# Patient Record
Sex: MALE | Race: WHITE | Employment: OTHER | URBAN - NONMETROPOLITAN AREA
[De-identification: names, ages, dates, MRNs, and addresses within clinical notes are randomized per-mention and may not be internally consistent; named-entity substitution may affect disease eponyms.]

---

## 2011-11-30 LAB — PROSTATE SPECIFIC ANTIGEN: 2.69 NG/ML

## 2012-06-15 LAB — PROSTATE SPECIFIC ANTIGEN: 2.25 NG/ML

## 2013-03-21 LAB — PROSTATE SPECIFIC ANTIGEN: 6.17 NG/ML

## 2013-10-15 LAB — PROSTATE SPECIFIC ANTIGEN: 3.01 NG/ML

## 2014-01-22 LAB — PROSTATE SPECIFIC ANTIGEN: 4.38 NG/ML

## 2014-08-05 LAB — PROSTATE SPECIFIC ANTIGEN: 1.63 NG/ML

## 2014-12-04 LAB — PROSTATE SPECIFIC ANTIGEN: 2.08 NG/ML

## 2015-10-14 LAB — PROSTATE SPECIFIC ANTIGEN: 1.35 NG/ML

## 2018-05-23 ENCOUNTER — HOSPITAL ENCOUNTER (OUTPATIENT)
Age: 78
Discharge: HOME | End: 2018-05-23
Payer: MEDICARE

## 2018-05-23 ENCOUNTER — OFFICE VISIT (OUTPATIENT)
Dept: SURGERY | Age: 78
End: 2018-05-23
Payer: COMMERCIAL

## 2018-05-23 VITALS
SYSTOLIC BLOOD PRESSURE: 116 MMHG | TEMPERATURE: 98.1 F | HEIGHT: 70 IN | HEART RATE: 80 BPM | DIASTOLIC BLOOD PRESSURE: 70 MMHG | RESPIRATION RATE: 18 BRPM | BODY MASS INDEX: 27.92 KG/M2 | WEIGHT: 195 LBS

## 2018-05-23 DIAGNOSIS — I10 ESSENTIAL HYPERTENSION: ICD-10-CM

## 2018-05-23 DIAGNOSIS — Z01.818 PRE-OP TESTING: ICD-10-CM

## 2018-05-23 DIAGNOSIS — K38.9 APPENDIX DISEASE: Primary | ICD-10-CM

## 2018-05-23 LAB
EKG ATRIAL RATE: 77 BPM
EKG P-R INTERVAL: 208 MS
EKG Q-T INTERVAL: 394 MS
EKG QRS DURATION: 108 MS
EKG QTC CALCULATION (BAZETT): 445 MS
EKG R AXIS: 28 DEGREES
EKG T AXIS: 10 DEGREES
EKG VENTRICULAR RATE: 77 BPM

## 2018-05-23 PROCEDURE — G8427 DOCREV CUR MEDS BY ELIG CLIN: HCPCS | Performed by: SURGERY

## 2018-05-23 PROCEDURE — G8419 CALC BMI OUT NRM PARAM NOF/U: HCPCS | Performed by: SURGERY

## 2018-05-23 PROCEDURE — 93005 ELECTROCARDIOGRAM TRACING: CPT | Performed by: SURGERY

## 2018-05-23 PROCEDURE — 99243 OFF/OP CNSLTJ NEW/EST LOW 30: CPT | Performed by: SURGERY

## 2018-05-23 RX ORDER — FINASTERIDE 5 MG/1
5 TABLET, FILM COATED ORAL DAILY
Status: ON HOLD | COMMUNITY
End: 2019-09-05 | Stop reason: HOSPADM

## 2018-05-23 RX ORDER — TAMSULOSIN HYDROCHLORIDE 0.4 MG/1
0.4 CAPSULE ORAL DAILY
Status: ON HOLD | COMMUNITY
End: 2019-09-05 | Stop reason: HOSPADM

## 2018-05-24 PROCEDURE — 93010 ELECTROCARDIOGRAM REPORT: CPT | Performed by: INTERNAL MEDICINE

## 2018-05-27 ASSESSMENT — ENCOUNTER SYMPTOMS
PHOTOPHOBIA: 0
CHEST TIGHTNESS: 0
NAUSEA: 0
VOMITING: 0
SHORTNESS OF BREATH: 0
SORE THROAT: 0
RHINORRHEA: 0
DIARRHEA: 0
ABDOMINAL DISTENTION: 0
BLOOD IN STOOL: 0
ALLERGIC/IMMUNOLOGIC NEGATIVE: 1
VOICE CHANGE: 0
ABDOMINAL PAIN: 0
EYE DISCHARGE: 0
TROUBLE SWALLOWING: 0
COLOR CHANGE: 0
APNEA: 0
ANAL BLEEDING: 0
EYE REDNESS: 0
STRIDOR: 0
WHEEZING: 0
EYE PAIN: 0
SINUS PRESSURE: 0
BACK PAIN: 0
CHOKING: 0
RECTAL PAIN: 0
CONSTIPATION: 0
EYE ITCHING: 0
COUGH: 0
FACIAL SWELLING: 0

## 2018-05-31 ENCOUNTER — HOSPITAL ENCOUNTER (OUTPATIENT)
Age: 78
Setting detail: OUTPATIENT SURGERY
Discharge: HOME | End: 2018-05-31
Attending: SURGERY | Admitting: SURGERY
Payer: MEDICARE

## 2018-05-31 ENCOUNTER — ANESTHESIA (OUTPATIENT)
Dept: OPERATING ROOM | Age: 78
End: 2018-05-31
Payer: MEDICARE

## 2018-05-31 ENCOUNTER — ANESTHESIA EVENT (OUTPATIENT)
Dept: OPERATING ROOM | Age: 78
End: 2018-05-31
Payer: MEDICARE

## 2018-05-31 VITALS
RESPIRATION RATE: 16 BRPM | SYSTOLIC BLOOD PRESSURE: 162 MMHG | TEMPERATURE: 97.1 F | WEIGHT: 189.8 LBS | DIASTOLIC BLOOD PRESSURE: 71 MMHG | HEART RATE: 80 BPM | BODY MASS INDEX: 27.17 KG/M2 | OXYGEN SATURATION: 93 % | HEIGHT: 70 IN

## 2018-05-31 VITALS
RESPIRATION RATE: 13 BRPM | OXYGEN SATURATION: 97 % | SYSTOLIC BLOOD PRESSURE: 208 MMHG | TEMPERATURE: 96.8 F | DIASTOLIC BLOOD PRESSURE: 90 MMHG

## 2018-05-31 DIAGNOSIS — K38.9 APPENDIX DISEASE: Primary | ICD-10-CM

## 2018-05-31 LAB
HCT VFR BLD CALC: 37.5 % (ref 42–52)
HEMOGLOBIN: 12.6 GM/DL (ref 14–18)
POTASSIUM SERPL-SCNC: 3.8 MEQ/L (ref 3.5–5.2)

## 2018-05-31 PROCEDURE — 3600000003 HC SURGERY LEVEL 3 BASE: Performed by: SURGERY

## 2018-05-31 PROCEDURE — 6360000002 HC RX W HCPCS: Performed by: NURSE ANESTHETIST, CERTIFIED REGISTERED

## 2018-05-31 PROCEDURE — 2580000003 HC RX 258

## 2018-05-31 PROCEDURE — 44204 LAPARO PARTIAL COLECTOMY: CPT | Performed by: SURGERY

## 2018-05-31 PROCEDURE — 2720000010 HC SURG SUPPLY STERILE: Performed by: SURGERY

## 2018-05-31 PROCEDURE — 88304 TISSUE EXAM BY PATHOLOGIST: CPT

## 2018-05-31 PROCEDURE — 44970 LAPAROSCOPY APPENDECTOMY: CPT | Performed by: SURGERY

## 2018-05-31 PROCEDURE — 2500000003 HC RX 250 WO HCPCS: Performed by: NURSE ANESTHETIST, CERTIFIED REGISTERED

## 2018-05-31 PROCEDURE — 6360000002 HC RX W HCPCS: Performed by: ANESTHESIOLOGY

## 2018-05-31 PROCEDURE — 2500000003 HC RX 250 WO HCPCS

## 2018-05-31 PROCEDURE — 2780000010 HC IMPLANT OTHER: Performed by: SURGERY

## 2018-05-31 PROCEDURE — 84132 ASSAY OF SERUM POTASSIUM: CPT

## 2018-05-31 PROCEDURE — 3700000000 HC ANESTHESIA ATTENDED CARE: Performed by: SURGERY

## 2018-05-31 PROCEDURE — 2500000003 HC RX 250 WO HCPCS: Performed by: ANESTHESIOLOGY

## 2018-05-31 PROCEDURE — 7100000000 HC PACU RECOVERY - FIRST 15 MIN: Performed by: SURGERY

## 2018-05-31 PROCEDURE — 36415 COLL VENOUS BLD VENIPUNCTURE: CPT

## 2018-05-31 PROCEDURE — 6370000000 HC RX 637 (ALT 250 FOR IP): Performed by: SURGERY

## 2018-05-31 PROCEDURE — 3700000001 HC ADD 15 MINUTES (ANESTHESIA): Performed by: SURGERY

## 2018-05-31 PROCEDURE — 3600000013 HC SURGERY LEVEL 3 ADDTL 15MIN: Performed by: SURGERY

## 2018-05-31 PROCEDURE — 2500000003 HC RX 250 WO HCPCS: Performed by: SURGERY

## 2018-05-31 PROCEDURE — 6360000002 HC RX W HCPCS

## 2018-05-31 PROCEDURE — 7100000010 HC PHASE II RECOVERY - FIRST 15 MIN: Performed by: SURGERY

## 2018-05-31 PROCEDURE — 7100000001 HC PACU RECOVERY - ADDTL 15 MIN: Performed by: SURGERY

## 2018-05-31 PROCEDURE — 85018 HEMOGLOBIN: CPT

## 2018-05-31 PROCEDURE — 7100000011 HC PHASE II RECOVERY - ADDTL 15 MIN: Performed by: SURGERY

## 2018-05-31 PROCEDURE — 85014 HEMATOCRIT: CPT

## 2018-05-31 DEVICE — DISCONTINUED USE 399249 CS RELOAD ECHELON WHITE 45MM: Type: IMPLANTABLE DEVICE | Site: ABDOMEN | Status: FUNCTIONAL

## 2018-05-31 DEVICE — RELOAD STPL L45MM H15 35MM REG TISS BLU 6 ROW B FRM NAT: Type: IMPLANTABLE DEVICE | Site: ABDOMEN | Status: FUNCTIONAL

## 2018-05-31 RX ORDER — NEOSTIGMINE METHYLSULFATE 1 MG/ML
INJECTION, SOLUTION INTRAVENOUS PRN
Status: DISCONTINUED | OUTPATIENT
Start: 2018-05-31 | End: 2018-05-31 | Stop reason: SDUPTHER

## 2018-05-31 RX ORDER — ACETAMINOPHEN 325 MG/1
650 TABLET ORAL EVERY 4 HOURS PRN
Status: DISCONTINUED | OUTPATIENT
Start: 2018-05-31 | End: 2018-05-31 | Stop reason: HOSPADM

## 2018-05-31 RX ORDER — ROCURONIUM BROMIDE 10 MG/ML
INJECTION, SOLUTION INTRAVENOUS PRN
Status: DISCONTINUED | OUTPATIENT
Start: 2018-05-31 | End: 2018-05-31 | Stop reason: SDUPTHER

## 2018-05-31 RX ORDER — HYDROCODONE BITARTRATE AND ACETAMINOPHEN 5; 325 MG/1; MG/1
1-2 TABLET ORAL EVERY 6 HOURS PRN
Qty: 30 TABLET | Refills: 0 | Status: SHIPPED | OUTPATIENT
Start: 2018-05-31 | End: 2018-06-06

## 2018-05-31 RX ORDER — GLYCOPYRROLATE 1 MG/5 ML
SYRINGE (ML) INTRAVENOUS PRN
Status: DISCONTINUED | OUTPATIENT
Start: 2018-05-31 | End: 2018-05-31 | Stop reason: SDUPTHER

## 2018-05-31 RX ORDER — PROPOFOL 10 MG/ML
INJECTION, EMULSION INTRAVENOUS PRN
Status: DISCONTINUED | OUTPATIENT
Start: 2018-05-31 | End: 2018-05-31 | Stop reason: SDUPTHER

## 2018-05-31 RX ORDER — ONDANSETRON 2 MG/ML
4 INJECTION INTRAMUSCULAR; INTRAVENOUS EVERY 6 HOURS PRN
Status: DISCONTINUED | OUTPATIENT
Start: 2018-05-31 | End: 2018-05-31 | Stop reason: HOSPADM

## 2018-05-31 RX ORDER — HYDROCODONE BITARTRATE AND ACETAMINOPHEN 5; 325 MG/1; MG/1
2 TABLET ORAL EVERY 4 HOURS PRN
Status: DISCONTINUED | OUTPATIENT
Start: 2018-05-31 | End: 2018-05-31 | Stop reason: HOSPADM

## 2018-05-31 RX ORDER — FENTANYL CITRATE 50 UG/ML
INJECTION, SOLUTION INTRAMUSCULAR; INTRAVENOUS PRN
Status: DISCONTINUED | OUTPATIENT
Start: 2018-05-31 | End: 2018-05-31 | Stop reason: SDUPTHER

## 2018-05-31 RX ORDER — MORPHINE SULFATE 2 MG/ML
4 INJECTION, SOLUTION INTRAMUSCULAR; INTRAVENOUS
Status: DISCONTINUED | OUTPATIENT
Start: 2018-05-31 | End: 2018-05-31

## 2018-05-31 RX ORDER — MORPHINE SULFATE 2 MG/ML
2 INJECTION, SOLUTION INTRAMUSCULAR; INTRAVENOUS EVERY 5 MIN PRN
Status: DISCONTINUED | OUTPATIENT
Start: 2018-05-31 | End: 2018-05-31 | Stop reason: HOSPADM

## 2018-05-31 RX ORDER — FENTANYL CITRATE 50 UG/ML
50 INJECTION, SOLUTION INTRAMUSCULAR; INTRAVENOUS EVERY 5 MIN PRN
Status: DISCONTINUED | OUTPATIENT
Start: 2018-05-31 | End: 2018-05-31 | Stop reason: HOSPADM

## 2018-05-31 RX ORDER — HYDROCODONE BITARTRATE AND ACETAMINOPHEN 5; 325 MG/1; MG/1
1 TABLET ORAL EVERY 4 HOURS PRN
Status: DISCONTINUED | OUTPATIENT
Start: 2018-05-31 | End: 2018-05-31 | Stop reason: HOSPADM

## 2018-05-31 RX ORDER — ONDANSETRON 2 MG/ML
INJECTION INTRAMUSCULAR; INTRAVENOUS PRN
Status: DISCONTINUED | OUTPATIENT
Start: 2018-05-31 | End: 2018-05-31 | Stop reason: SDUPTHER

## 2018-05-31 RX ORDER — METOPROLOL TARTRATE 5 MG/5ML
INJECTION INTRAVENOUS
Status: COMPLETED
Start: 2018-05-31 | End: 2018-05-31

## 2018-05-31 RX ORDER — DEXAMETHASONE SODIUM PHOSPHATE 4 MG/ML
INJECTION, SOLUTION INTRA-ARTICULAR; INTRALESIONAL; INTRAMUSCULAR; INTRAVENOUS; SOFT TISSUE PRN
Status: DISCONTINUED | OUTPATIENT
Start: 2018-05-31 | End: 2018-05-31 | Stop reason: SDUPTHER

## 2018-05-31 RX ORDER — BUPIVACAINE HYDROCHLORIDE AND EPINEPHRINE 5; 5 MG/ML; UG/ML
INJECTION, SOLUTION EPIDURAL; INTRACAUDAL; PERINEURAL PRN
Status: DISCONTINUED | OUTPATIENT
Start: 2018-05-31 | End: 2018-05-31 | Stop reason: HOSPADM

## 2018-05-31 RX ORDER — MORPHINE SULFATE 2 MG/ML
2 INJECTION, SOLUTION INTRAMUSCULAR; INTRAVENOUS
Status: DISCONTINUED | OUTPATIENT
Start: 2018-05-31 | End: 2018-05-31 | Stop reason: HOSPADM

## 2018-05-31 RX ORDER — MORPHINE SULFATE 2 MG/ML
4 INJECTION, SOLUTION INTRAMUSCULAR; INTRAVENOUS
Status: DISCONTINUED | OUTPATIENT
Start: 2018-05-31 | End: 2018-05-31 | Stop reason: HOSPADM

## 2018-05-31 RX ORDER — LABETALOL HYDROCHLORIDE 5 MG/ML
10 INJECTION, SOLUTION INTRAVENOUS EVERY 10 MIN PRN
Status: DISCONTINUED | OUTPATIENT
Start: 2018-05-31 | End: 2018-05-31 | Stop reason: HOSPADM

## 2018-05-31 RX ORDER — SODIUM CHLORIDE 9 MG/ML
INJECTION, SOLUTION INTRAVENOUS CONTINUOUS
Status: DISCONTINUED | OUTPATIENT
Start: 2018-05-31 | End: 2018-05-31 | Stop reason: HOSPADM

## 2018-05-31 RX ORDER — LIDOCAINE HYDROCHLORIDE 20 MG/ML
INJECTION, SOLUTION INFILTRATION; PERINEURAL PRN
Status: DISCONTINUED | OUTPATIENT
Start: 2018-05-31 | End: 2018-05-31 | Stop reason: SDUPTHER

## 2018-05-31 RX ORDER — SODIUM CHLORIDE 0.9 % (FLUSH) 0.9 %
10 SYRINGE (ML) INJECTION PRN
Status: DISCONTINUED | OUTPATIENT
Start: 2018-05-31 | End: 2018-05-31 | Stop reason: HOSPADM

## 2018-05-31 RX ORDER — METOPROLOL TARTRATE 5 MG/5ML
2 INJECTION INTRAVENOUS EVERY 5 MIN PRN
Status: DISCONTINUED | OUTPATIENT
Start: 2018-05-31 | End: 2018-05-31 | Stop reason: HOSPADM

## 2018-05-31 RX ORDER — SODIUM CHLORIDE 0.9 % (FLUSH) 0.9 %
10 SYRINGE (ML) INJECTION EVERY 12 HOURS SCHEDULED
Status: DISCONTINUED | OUTPATIENT
Start: 2018-05-31 | End: 2018-05-31 | Stop reason: HOSPADM

## 2018-05-31 RX ORDER — MORPHINE SULFATE 2 MG/ML
2 INJECTION, SOLUTION INTRAMUSCULAR; INTRAVENOUS
Status: DISCONTINUED | OUTPATIENT
Start: 2018-05-31 | End: 2018-05-31

## 2018-05-31 RX ADMIN — ONDANSETRON HYDROCHLORIDE 4 MG: 4 INJECTION, SOLUTION INTRAMUSCULAR; INTRAVENOUS at 16:00

## 2018-05-31 RX ADMIN — Medication 1 MG: at 16:23

## 2018-05-31 RX ADMIN — SODIUM CHLORIDE: 9 INJECTION, SOLUTION INTRAVENOUS at 16:35

## 2018-05-31 RX ADMIN — FENTANYL CITRATE 50 MCG: 50 INJECTION INTRAMUSCULAR; INTRAVENOUS at 16:38

## 2018-05-31 RX ADMIN — DEXAMETHASONE SODIUM PHOSPHATE 4 MG: 4 INJECTION, SOLUTION INTRAMUSCULAR; INTRAVENOUS at 16:00

## 2018-05-31 RX ADMIN — HYDROCODONE BITARTRATE AND ACETAMINOPHEN 1 TABLET: 5; 325 TABLET ORAL at 18:39

## 2018-05-31 RX ADMIN — LIDOCAINE HYDROCHLORIDE 100 MG: 20 INJECTION, SOLUTION INFILTRATION; PERINEURAL at 15:54

## 2018-05-31 RX ADMIN — CEFOXITIN 2 G: 2 INJECTION, POWDER, FOR SOLUTION INTRAVENOUS at 16:00

## 2018-05-31 RX ADMIN — SODIUM CHLORIDE: 9 INJECTION, SOLUTION INTRAVENOUS at 13:02

## 2018-05-31 RX ADMIN — FENTANYL CITRATE 50 MCG: 50 INJECTION INTRAMUSCULAR; INTRAVENOUS at 16:13

## 2018-05-31 RX ADMIN — METOROPROLOL TARTRATE 2 MG: 5 INJECTION, SOLUTION INTRAVENOUS at 17:05

## 2018-05-31 RX ADMIN — METOROPROLOL TARTRATE 2 MG: 5 INJECTION, SOLUTION INTRAVENOUS at 17:24

## 2018-05-31 RX ADMIN — FENTANYL CITRATE 50 MCG: 50 INJECTION, SOLUTION INTRAMUSCULAR; INTRAVENOUS at 17:07

## 2018-05-31 RX ADMIN — PROPOFOL 150 MG: 10 INJECTION, EMULSION INTRAVENOUS at 15:54

## 2018-05-31 RX ADMIN — PHENYLEPHRINE HYDROCHLORIDE 100 MCG: 10 INJECTION INTRAVENOUS at 16:03

## 2018-05-31 RX ADMIN — ROCURONIUM BROMIDE 40 MG: 10 INJECTION, SOLUTION INTRAVENOUS at 15:54

## 2018-05-31 RX ADMIN — FENTANYL CITRATE 100 MCG: 50 INJECTION INTRAMUSCULAR; INTRAVENOUS at 15:54

## 2018-05-31 RX ADMIN — FENTANYL CITRATE 50 MCG: 50 INJECTION, SOLUTION INTRAMUSCULAR; INTRAVENOUS at 16:49

## 2018-05-31 RX ADMIN — NEOSTIGMINE METHYLSULFATE 5 MG: 1 INJECTION, SOLUTION INTRAVENOUS at 16:23

## 2018-05-31 ASSESSMENT — PULMONARY FUNCTION TESTS
PIF_VALUE: 19
PIF_VALUE: 21
PIF_VALUE: 13
PIF_VALUE: 15
PIF_VALUE: 10
PIF_VALUE: 20
PIF_VALUE: 1
PIF_VALUE: 21
PIF_VALUE: 19
PIF_VALUE: 21
PIF_VALUE: 15
PIF_VALUE: 18
PIF_VALUE: 22
PIF_VALUE: 13
PIF_VALUE: 20
PIF_VALUE: 14
PIF_VALUE: 20
PIF_VALUE: 13
PIF_VALUE: 15
PIF_VALUE: 3
PIF_VALUE: 21
PIF_VALUE: 20
PIF_VALUE: 15
PIF_VALUE: 19
PIF_VALUE: 18
PIF_VALUE: 23
PIF_VALUE: 13
PIF_VALUE: 15
PIF_VALUE: 13
PIF_VALUE: 21
PIF_VALUE: 20
PIF_VALUE: 21
PIF_VALUE: 12
PIF_VALUE: 18
PIF_VALUE: 15
PIF_VALUE: 23
PIF_VALUE: 21
PIF_VALUE: 13
PIF_VALUE: 16
PIF_VALUE: 15
PIF_VALUE: 21
PIF_VALUE: 15

## 2018-05-31 ASSESSMENT — PAIN SCALES - GENERAL
PAINLEVEL_OUTOF10: 1
PAINLEVEL_OUTOF10: 4
PAINLEVEL_OUTOF10: 8
PAINLEVEL_OUTOF10: 2
PAINLEVEL_OUTOF10: 0
PAINLEVEL_OUTOF10: 2
PAINLEVEL_OUTOF10: 1
PAINLEVEL_OUTOF10: 7

## 2018-06-01 ENCOUNTER — HOSPITAL ENCOUNTER (EMERGENCY)
Age: 78
Discharge: HOME | End: 2018-06-01
Attending: FAMILY MEDICINE
Payer: MEDICARE

## 2018-06-01 ENCOUNTER — TELEPHONE (OUTPATIENT)
Dept: SURGERY | Age: 78
End: 2018-06-01

## 2018-06-01 VITALS
WEIGHT: 195 LBS | BODY MASS INDEX: 27.92 KG/M2 | RESPIRATION RATE: 25 BRPM | DIASTOLIC BLOOD PRESSURE: 62 MMHG | OXYGEN SATURATION: 94 % | HEART RATE: 83 BPM | HEIGHT: 70 IN | TEMPERATURE: 98.2 F | SYSTOLIC BLOOD PRESSURE: 132 MMHG

## 2018-06-01 DIAGNOSIS — R33.9 URINARY RETENTION: Primary | ICD-10-CM

## 2018-06-01 LAB
ANION GAP SERPL CALCULATED.3IONS-SCNC: 11 MEQ/L (ref 8–16)
BASOPHILS # BLD: 0.6 %
BASOPHILS ABSOLUTE: 0.1 THOU/MM3 (ref 0–0.1)
BILIRUBIN URINE: NEGATIVE
BLOOD, URINE: NEGATIVE
BUN BLDV-MCNC: 16 MG/DL (ref 7–22)
CALCIUM SERPL-MCNC: 8.5 MG/DL (ref 8.5–10.5)
CHARACTER, URINE: CLEAR
CHLORIDE BLD-SCNC: 102 MEQ/L (ref 98–111)
CO2: 24 MEQ/L (ref 23–33)
COLOR: YELLOW
CREAT SERPL-MCNC: 1.1 MG/DL (ref 0.4–1.2)
EOSINOPHIL # BLD: 0 %
EOSINOPHILS ABSOLUTE: 0 THOU/MM3 (ref 0–0.4)
GFR SERPL CREATININE-BSD FRML MDRD: 65 ML/MIN/1.73M2
GLUCOSE BLD-MCNC: 163 MG/DL (ref 70–108)
GLUCOSE URINE: NEGATIVE MG/DL
HCT VFR BLD CALC: 38.5 % (ref 42–52)
HEMOGLOBIN: 13 GM/DL (ref 14–18)
KETONES, URINE: 15
LEUKOCYTE ESTERASE, URINE: NEGATIVE
LYMPHOCYTES # BLD: 8 %
LYMPHOCYTES ABSOLUTE: 0.8 THOU/MM3 (ref 1–4.8)
MCH RBC QN AUTO: 30.5 PG (ref 27–31)
MCHC RBC AUTO-ENTMCNC: 33.7 GM/DL (ref 33–37)
MCV RBC AUTO: 90.6 FL (ref 80–94)
MONOCYTES # BLD: 4.9 %
MONOCYTES ABSOLUTE: 0.5 THOU/MM3 (ref 0.4–1.3)
NITRITE, URINE: NEGATIVE
NUCLEATED RED BLOOD CELLS: 0 /100 WBC
OSMOLALITY CALCULATION: 278.6 MOSMOL/KG (ref 275–300)
PDW BLD-RTO: 13.8 % (ref 11.5–14.5)
PH UA: 6
PLATELET # BLD: 215 THOU/MM3 (ref 130–400)
PMV BLD AUTO: 8.1 FL (ref 7.4–10.4)
POTASSIUM SERPL-SCNC: 4 MEQ/L (ref 3.5–5.2)
PROTEIN UA: NEGATIVE
RBC # BLD: 4.25 MILL/MM3 (ref 4.7–6.1)
SEG NEUTROPHILS: 86.5 %
SEGMENTED NEUTROPHILS ABSOLUTE COUNT: 8.5 THOU/MM3 (ref 1.8–7.7)
SODIUM BLD-SCNC: 137 MEQ/L (ref 135–145)
SPECIFIC GRAVITY, URINE: 1.02 (ref 1–1.03)
UROBILINOGEN, URINE: 0.2 EU/DL
WBC # BLD: 9.8 THOU/MM3 (ref 4.8–10.8)

## 2018-06-01 PROCEDURE — 99283 EMERGENCY DEPT VISIT LOW MDM: CPT

## 2018-06-01 PROCEDURE — 85025 COMPLETE CBC W/AUTO DIFF WBC: CPT

## 2018-06-01 PROCEDURE — 80048 BASIC METABOLIC PNL TOTAL CA: CPT

## 2018-06-01 PROCEDURE — 81003 URINALYSIS AUTO W/O SCOPE: CPT

## 2018-06-01 PROCEDURE — 36415 COLL VENOUS BLD VENIPUNCTURE: CPT

## 2018-06-01 ASSESSMENT — ENCOUNTER SYMPTOMS
EYE DISCHARGE: 0
ABDOMINAL PAIN: 0
SHORTNESS OF BREATH: 0
RHINORRHEA: 0
EYE REDNESS: 0
DIARRHEA: 0
BACK PAIN: 0
SORE THROAT: 0
COUGH: 0
WHEEZING: 0
NAUSEA: 0
VOMITING: 0

## 2018-06-05 ENCOUNTER — TELEPHONE (OUTPATIENT)
Dept: SURGERY | Age: 78
End: 2018-06-05

## 2018-06-06 ENCOUNTER — OFFICE VISIT (OUTPATIENT)
Dept: SURGERY | Age: 78
End: 2018-06-06

## 2018-06-06 VITALS
DIASTOLIC BLOOD PRESSURE: 70 MMHG | SYSTOLIC BLOOD PRESSURE: 136 MMHG | HEIGHT: 70 IN | WEIGHT: 188.8 LBS | RESPIRATION RATE: 18 BRPM | HEART RATE: 95 BPM | OXYGEN SATURATION: 95 % | BODY MASS INDEX: 27.03 KG/M2 | TEMPERATURE: 98.3 F

## 2018-06-06 DIAGNOSIS — D37.3 LOW GRADE MUCINOUS NEOPLASM OF APPENDIX: Primary | ICD-10-CM

## 2018-06-06 PROCEDURE — 99024 POSTOP FOLLOW-UP VISIT: CPT | Performed by: SURGERY

## 2018-06-06 RX ORDER — METRONIDAZOLE 500 MG/1
TABLET ORAL
Qty: 3 TABLET | Refills: 0 | Status: ON HOLD | OUTPATIENT
Start: 2018-06-06 | End: 2018-06-23 | Stop reason: HOSPADM

## 2018-06-07 ENCOUNTER — OFFICE VISIT (OUTPATIENT)
Dept: UROLOGY | Age: 78
End: 2018-06-07
Payer: MEDICARE

## 2018-06-07 VITALS
SYSTOLIC BLOOD PRESSURE: 112 MMHG | DIASTOLIC BLOOD PRESSURE: 60 MMHG | WEIGHT: 189.6 LBS | BODY MASS INDEX: 27.14 KG/M2 | HEIGHT: 70 IN

## 2018-06-07 DIAGNOSIS — R33.9 URINARY RETENTION: Primary | ICD-10-CM

## 2018-06-07 PROCEDURE — 4040F PNEUMOC VAC/ADMIN/RCVD: CPT | Performed by: NURSE PRACTITIONER

## 2018-06-07 PROCEDURE — 1123F ACP DISCUSS/DSCN MKR DOCD: CPT | Performed by: NURSE PRACTITIONER

## 2018-06-07 PROCEDURE — G8419 CALC BMI OUT NRM PARAM NOF/U: HCPCS | Performed by: NURSE PRACTITIONER

## 2018-06-07 PROCEDURE — G8427 DOCREV CUR MEDS BY ELIG CLIN: HCPCS | Performed by: NURSE PRACTITIONER

## 2018-06-07 PROCEDURE — 99203 OFFICE O/P NEW LOW 30 MIN: CPT | Performed by: NURSE PRACTITIONER

## 2018-06-07 PROCEDURE — 1036F TOBACCO NON-USER: CPT | Performed by: NURSE PRACTITIONER

## 2018-06-07 ASSESSMENT — ENCOUNTER SYMPTOMS
EYES NEGATIVE: 1
GASTROINTESTINAL NEGATIVE: 1
VOMITING: 0
COUGH: 0
ALLERGIC/IMMUNOLOGIC NEGATIVE: 1
ABDOMINAL PAIN: 0
NAUSEA: 0
SHORTNESS OF BREATH: 0

## 2018-06-16 ASSESSMENT — ENCOUNTER SYMPTOMS
SHORTNESS OF BREATH: 0
ALLERGIC/IMMUNOLOGIC NEGATIVE: 1
ABDOMINAL DISTENTION: 0
BLOOD IN STOOL: 0
TROUBLE SWALLOWING: 0
CHEST TIGHTNESS: 0
VOICE CHANGE: 0
FACIAL SWELLING: 0
EYE REDNESS: 0
COUGH: 0
STRIDOR: 0
VOMITING: 0
BACK PAIN: 0
CONSTIPATION: 0
CHOKING: 0
SORE THROAT: 0
DIARRHEA: 0
APNEA: 0
WHEEZING: 0
ABDOMINAL PAIN: 1
PHOTOPHOBIA: 0
NAUSEA: 0
ANAL BLEEDING: 0
EYE DISCHARGE: 0
RHINORRHEA: 0
EYE PAIN: 0
EYE ITCHING: 0
SINUS PRESSURE: 0
RECTAL PAIN: 0
COLOR CHANGE: 0

## 2018-06-18 ENCOUNTER — PROCEDURE VISIT (OUTPATIENT)
Dept: UROLOGY | Age: 78
End: 2018-06-18
Payer: MEDICARE

## 2018-06-18 VITALS
DIASTOLIC BLOOD PRESSURE: 72 MMHG | SYSTOLIC BLOOD PRESSURE: 124 MMHG | WEIGHT: 190 LBS | BODY MASS INDEX: 27.2 KG/M2 | HEIGHT: 70 IN

## 2018-06-18 DIAGNOSIS — R33.9 URINARY RETENTION: ICD-10-CM

## 2018-06-18 DIAGNOSIS — N32.89 BLADDER WALL THICKENING: Primary | ICD-10-CM

## 2018-06-18 LAB
BILIRUBIN URINE: NEGATIVE
BLOOD URINE, POC: NORMAL
CHARACTER, URINE: CLEAR
COLOR, URINE: YELLOW
GLUCOSE URINE: NEGATIVE MG/DL
KETONES, URINE: NEGATIVE
LEUKOCYTE CLUMPS, URINE: NEGATIVE
NITRITE, URINE: NEGATIVE
PH, URINE: 6
POST VOID RESIDUAL (PVR): 0 ML
PROTEIN, URINE: NEGATIVE MG/DL
SPECIFIC GRAVITY, URINE: 1.01 (ref 1–1.03)
UROBILINOGEN, URINE: 0.2 EU/DL

## 2018-06-18 PROCEDURE — 99999 PR OFFICE/OUTPT VISIT,PROCEDURE ONLY: CPT | Performed by: UROLOGY

## 2018-06-18 PROCEDURE — 52000 CYSTOURETHROSCOPY: CPT | Performed by: UROLOGY

## 2018-06-18 PROCEDURE — 51798 US URINE CAPACITY MEASURE: CPT | Performed by: UROLOGY

## 2018-06-18 PROCEDURE — 81003 URINALYSIS AUTO W/O SCOPE: CPT | Performed by: UROLOGY

## 2018-06-21 ENCOUNTER — ANESTHESIA EVENT (OUTPATIENT)
Dept: OPERATING ROOM | Age: 78
DRG: 331 | End: 2018-06-21
Payer: MEDICARE

## 2018-06-21 ENCOUNTER — ANESTHESIA (OUTPATIENT)
Dept: OPERATING ROOM | Age: 78
DRG: 331 | End: 2018-06-21
Payer: MEDICARE

## 2018-06-21 ENCOUNTER — HOSPITAL ENCOUNTER (INPATIENT)
Age: 78
LOS: 3 days | Discharge: HOME | DRG: 331 | End: 2018-06-24
Attending: SURGERY | Admitting: SURGERY
Payer: MEDICARE

## 2018-06-21 VITALS
DIASTOLIC BLOOD PRESSURE: 66 MMHG | OXYGEN SATURATION: 100 % | SYSTOLIC BLOOD PRESSURE: 145 MMHG | TEMPERATURE: 96.3 F | RESPIRATION RATE: 2 BRPM

## 2018-06-21 PROBLEM — D37.3 APPENDICEAL TUMOR: Status: ACTIVE | Noted: 2018-06-21

## 2018-06-21 LAB
ABO: NORMAL
ANION GAP SERPL CALCULATED.3IONS-SCNC: 13 MEQ/L (ref 8–16)
ANTIBODY SCREEN: NORMAL
BASOPHILS # BLD: 1 %
BASOPHILS ABSOLUTE: 0.1 THOU/MM3 (ref 0–0.1)
BUN BLDV-MCNC: 9 MG/DL (ref 7–22)
CALCIUM SERPL-MCNC: 9.2 MG/DL (ref 8.5–10.5)
CHLORIDE BLD-SCNC: 104 MEQ/L (ref 98–111)
CO2: 22 MEQ/L (ref 23–33)
CREAT SERPL-MCNC: 1.1 MG/DL (ref 0.4–1.2)
EOSINOPHIL # BLD: 2.9 %
EOSINOPHILS ABSOLUTE: 0.2 THOU/MM3 (ref 0–0.4)
GFR SERPL CREATININE-BSD FRML MDRD: 65 ML/MIN/1.73M2
GLUCOSE BLD-MCNC: 124 MG/DL (ref 70–108)
GLUCOSE BLD-MCNC: 195 MG/DL (ref 70–108)
HCT VFR BLD CALC: 39.5 % (ref 42–52)
HEMOGLOBIN: 13.3 GM/DL (ref 14–18)
LYMPHOCYTES # BLD: 20.9 %
LYMPHOCYTES ABSOLUTE: 1.4 THOU/MM3 (ref 1–4.8)
MCH RBC QN AUTO: 30.5 PG (ref 27–31)
MCHC RBC AUTO-ENTMCNC: 33.5 GM/DL (ref 33–37)
MCV RBC AUTO: 90.8 FL (ref 80–94)
MONOCYTES # BLD: 12.5 %
MONOCYTES ABSOLUTE: 0.8 THOU/MM3 (ref 0.4–1.3)
NUCLEATED RED BLOOD CELLS: 0 /100 WBC
PDW BLD-RTO: 14.1 % (ref 11.5–14.5)
PLATELET # BLD: 235 THOU/MM3 (ref 130–400)
PMV BLD AUTO: 8 FL (ref 7.4–10.4)
POTASSIUM SERPL-SCNC: 3.5 MEQ/L (ref 3.5–5.2)
RBC # BLD: 4.35 MILL/MM3 (ref 4.7–6.1)
RH FACTOR: NORMAL
SEG NEUTROPHILS: 62.7 %
SEGMENTED NEUTROPHILS ABSOLUTE COUNT: 4.2 THOU/MM3 (ref 1.8–7.7)
SODIUM BLD-SCNC: 139 MEQ/L (ref 135–145)
WBC # BLD: 6.7 THOU/MM3 (ref 4.8–10.8)

## 2018-06-21 PROCEDURE — 3600000019 HC SURGERY ROBOT ADDTL 15MIN: Performed by: SURGERY

## 2018-06-21 PROCEDURE — S0028 INJECTION, FAMOTIDINE, 20 MG: HCPCS | Performed by: SURGERY

## 2018-06-21 PROCEDURE — 6360000002 HC RX W HCPCS: Performed by: REGISTERED NURSE

## 2018-06-21 PROCEDURE — 2580000003 HC RX 258: Performed by: SURGERY

## 2018-06-21 PROCEDURE — 2580000003 HC RX 258: Performed by: REGISTERED NURSE

## 2018-06-21 PROCEDURE — 2500000003 HC RX 250 WO HCPCS

## 2018-06-21 PROCEDURE — 0DTF4ZZ RESECTION OF RIGHT LARGE INTESTINE, PERCUTANEOUS ENDOSCOPIC APPROACH: ICD-10-PCS | Performed by: SURGERY

## 2018-06-21 PROCEDURE — 44205 LAP COLECTOMY PART W/ILEUM: CPT | Performed by: SURGERY

## 2018-06-21 PROCEDURE — 6370000000 HC RX 637 (ALT 250 FOR IP): Performed by: SURGERY

## 2018-06-21 PROCEDURE — 88307 TISSUE EXAM BY PATHOLOGIST: CPT

## 2018-06-21 PROCEDURE — 36415 COLL VENOUS BLD VENIPUNCTURE: CPT

## 2018-06-21 PROCEDURE — 7100000001 HC PACU RECOVERY - ADDTL 15 MIN: Performed by: SURGERY

## 2018-06-21 PROCEDURE — 2500000003 HC RX 250 WO HCPCS: Performed by: SURGERY

## 2018-06-21 PROCEDURE — 86850 RBC ANTIBODY SCREEN: CPT

## 2018-06-21 PROCEDURE — 2780000010 HC IMPLANT OTHER: Performed by: SURGERY

## 2018-06-21 PROCEDURE — 82948 REAGENT STRIP/BLOOD GLUCOSE: CPT

## 2018-06-21 PROCEDURE — 3700000000 HC ANESTHESIA ATTENDED CARE: Performed by: SURGERY

## 2018-06-21 PROCEDURE — 80048 BASIC METABOLIC PNL TOTAL CA: CPT

## 2018-06-21 PROCEDURE — 3600000009 HC SURGERY ROBOT BASE: Performed by: SURGERY

## 2018-06-21 PROCEDURE — 3700000001 HC ADD 15 MINUTES (ANESTHESIA): Performed by: SURGERY

## 2018-06-21 PROCEDURE — 6360000002 HC RX W HCPCS: Performed by: SURGERY

## 2018-06-21 PROCEDURE — S2900 ROBOTIC SURGICAL SYSTEM: HCPCS | Performed by: SURGERY

## 2018-06-21 PROCEDURE — 6360000002 HC RX W HCPCS: Performed by: ANESTHESIOLOGY

## 2018-06-21 PROCEDURE — 8E0W4CZ ROBOTIC ASSISTED PROCEDURE OF TRUNK REGION, PERCUTANEOUS ENDOSCOPIC APPROACH: ICD-10-PCS | Performed by: SURGERY

## 2018-06-21 PROCEDURE — 86901 BLOOD TYPING SEROLOGIC RH(D): CPT

## 2018-06-21 PROCEDURE — 86900 BLOOD TYPING SEROLOGIC ABO: CPT

## 2018-06-21 PROCEDURE — 2500000003 HC RX 250 WO HCPCS: Performed by: REGISTERED NURSE

## 2018-06-21 PROCEDURE — 85025 COMPLETE CBC W/AUTO DIFF WBC: CPT

## 2018-06-21 PROCEDURE — 2720000010 HC SURG SUPPLY STERILE: Performed by: SURGERY

## 2018-06-21 PROCEDURE — 7100000000 HC PACU RECOVERY - FIRST 15 MIN: Performed by: SURGERY

## 2018-06-21 PROCEDURE — 1200000000 HC SEMI PRIVATE

## 2018-06-21 DEVICE — SEALANT HEMSTAT 5ML HUM FIBRIN THROM 2 VI APPL DEV EVICEL: Type: IMPLANTABLE DEVICE | Site: ABDOMEN | Status: FUNCTIONAL

## 2018-06-21 DEVICE — RELOAD STPL H35XL60MM BLU REG B FORM NAT ARTC ECHELON: Type: IMPLANTABLE DEVICE | Site: ABDOMEN | Status: FUNCTIONAL

## 2018-06-21 RX ORDER — ONDANSETRON 2 MG/ML
INJECTION INTRAMUSCULAR; INTRAVENOUS PRN
Status: DISCONTINUED | OUTPATIENT
Start: 2018-06-21 | End: 2018-06-21 | Stop reason: SDUPTHER

## 2018-06-21 RX ORDER — BUPIVACAINE HYDROCHLORIDE AND EPINEPHRINE 5; 5 MG/ML; UG/ML
INJECTION, SOLUTION EPIDURAL; INTRACAUDAL; PERINEURAL PRN
Status: DISCONTINUED | OUTPATIENT
Start: 2018-06-21 | End: 2018-06-21 | Stop reason: HOSPADM

## 2018-06-21 RX ORDER — EPHEDRINE SULFATE 50 MG/ML
INJECTION, SOLUTION INTRAVENOUS PRN
Status: DISCONTINUED | OUTPATIENT
Start: 2018-06-21 | End: 2018-06-21 | Stop reason: SDUPTHER

## 2018-06-21 RX ORDER — SODIUM CHLORIDE 0.9 % (FLUSH) 0.9 %
10 SYRINGE (ML) INJECTION PRN
Status: DISCONTINUED | OUTPATIENT
Start: 2018-06-21 | End: 2018-06-24 | Stop reason: HOSPADM

## 2018-06-21 RX ORDER — FENTANYL CITRATE 50 UG/ML
INJECTION, SOLUTION INTRAMUSCULAR; INTRAVENOUS PRN
Status: DISCONTINUED | OUTPATIENT
Start: 2018-06-21 | End: 2018-06-21 | Stop reason: SDUPTHER

## 2018-06-21 RX ORDER — MORPHINE SULFATE 2 MG/ML
2 INJECTION, SOLUTION INTRAMUSCULAR; INTRAVENOUS
Status: ACTIVE | OUTPATIENT
Start: 2018-06-21 | End: 2018-06-22

## 2018-06-21 RX ORDER — ONDANSETRON 2 MG/ML
4 INJECTION INTRAMUSCULAR; INTRAVENOUS EVERY 6 HOURS PRN
Status: DISCONTINUED | OUTPATIENT
Start: 2018-06-21 | End: 2018-06-24 | Stop reason: HOSPADM

## 2018-06-21 RX ORDER — NEOSTIGMINE METHYLSULFATE 1 MG/ML
INJECTION, SOLUTION INTRAVENOUS PRN
Status: DISCONTINUED | OUTPATIENT
Start: 2018-06-21 | End: 2018-06-21 | Stop reason: SDUPTHER

## 2018-06-21 RX ORDER — FENTANYL CITRATE 50 UG/ML
50 INJECTION, SOLUTION INTRAMUSCULAR; INTRAVENOUS EVERY 5 MIN PRN
Status: DISCONTINUED | OUTPATIENT
Start: 2018-06-21 | End: 2018-06-21 | Stop reason: HOSPADM

## 2018-06-21 RX ORDER — TAMSULOSIN HYDROCHLORIDE 0.4 MG/1
0.4 CAPSULE ORAL DAILY
Status: DISCONTINUED | OUTPATIENT
Start: 2018-06-21 | End: 2018-06-24 | Stop reason: HOSPADM

## 2018-06-21 RX ORDER — DIPHENHYDRAMINE HYDROCHLORIDE 50 MG/ML
12.5 INJECTION INTRAMUSCULAR; INTRAVENOUS
Status: DISCONTINUED | OUTPATIENT
Start: 2018-06-21 | End: 2018-06-21 | Stop reason: HOSPADM

## 2018-06-21 RX ORDER — HYOSCYAMINE SULFATE 0.125 MG
125 TABLET,DISINTEGRATING ORAL EVERY 4 HOURS PRN
Status: DISCONTINUED | OUTPATIENT
Start: 2018-06-21 | End: 2018-06-24 | Stop reason: HOSPADM

## 2018-06-21 RX ORDER — DEXAMETHASONE SODIUM PHOSPHATE 4 MG/ML
INJECTION, SOLUTION INTRA-ARTICULAR; INTRALESIONAL; INTRAMUSCULAR; INTRAVENOUS; SOFT TISSUE PRN
Status: DISCONTINUED | OUTPATIENT
Start: 2018-06-21 | End: 2018-06-21 | Stop reason: SDUPTHER

## 2018-06-21 RX ORDER — SODIUM CHLORIDE, SODIUM LACTATE, POTASSIUM CHLORIDE, CALCIUM CHLORIDE 600; 310; 30; 20 MG/100ML; MG/100ML; MG/100ML; MG/100ML
INJECTION, SOLUTION INTRAVENOUS CONTINUOUS
Status: DISCONTINUED | OUTPATIENT
Start: 2018-06-21 | End: 2018-06-23

## 2018-06-21 RX ORDER — MEPERIDINE HYDROCHLORIDE 25 MG/ML
12.5 INJECTION INTRAMUSCULAR; INTRAVENOUS; SUBCUTANEOUS EVERY 5 MIN PRN
Status: DISCONTINUED | OUTPATIENT
Start: 2018-06-21 | End: 2018-06-21 | Stop reason: HOSPADM

## 2018-06-21 RX ORDER — ACETAMINOPHEN 325 MG/1
650 TABLET ORAL EVERY 4 HOURS PRN
Status: DISCONTINUED | OUTPATIENT
Start: 2018-06-21 | End: 2018-06-24 | Stop reason: HOSPADM

## 2018-06-21 RX ORDER — HYDRALAZINE HYDROCHLORIDE 20 MG/ML
5 INJECTION INTRAMUSCULAR; INTRAVENOUS EVERY 10 MIN PRN
Status: DISCONTINUED | OUTPATIENT
Start: 2018-06-21 | End: 2018-06-21 | Stop reason: HOSPADM

## 2018-06-21 RX ORDER — SODIUM CHLORIDE 9 MG/ML
INJECTION, SOLUTION INTRAVENOUS CONTINUOUS
Status: DISCONTINUED | OUTPATIENT
Start: 2018-06-21 | End: 2018-06-21

## 2018-06-21 RX ORDER — INDOCYANINE GREEN AND WATER 25 MG
KIT INJECTION PRN
Status: DISCONTINUED | OUTPATIENT
Start: 2018-06-21 | End: 2018-06-21 | Stop reason: SDUPTHER

## 2018-06-21 RX ORDER — SODIUM CHLORIDE, SODIUM LACTATE, POTASSIUM CHLORIDE, CALCIUM CHLORIDE 600; 310; 30; 20 MG/100ML; MG/100ML; MG/100ML; MG/100ML
INJECTION, SOLUTION INTRAVENOUS CONTINUOUS PRN
Status: DISCONTINUED | OUTPATIENT
Start: 2018-06-21 | End: 2018-06-21 | Stop reason: SDUPTHER

## 2018-06-21 RX ORDER — GLYCOPYRROLATE 1 MG/5 ML
SYRINGE (ML) INTRAVENOUS PRN
Status: DISCONTINUED | OUTPATIENT
Start: 2018-06-21 | End: 2018-06-21 | Stop reason: SDUPTHER

## 2018-06-21 RX ORDER — LIDOCAINE HYDROCHLORIDE 20 MG/ML
INJECTION, SOLUTION INFILTRATION; PERINEURAL PRN
Status: DISCONTINUED | OUTPATIENT
Start: 2018-06-21 | End: 2018-06-21 | Stop reason: SDUPTHER

## 2018-06-21 RX ORDER — SODIUM CHLORIDE 0.9 % (FLUSH) 0.9 %
10 SYRINGE (ML) INJECTION EVERY 12 HOURS SCHEDULED
Status: DISCONTINUED | OUTPATIENT
Start: 2018-06-21 | End: 2018-06-24 | Stop reason: HOSPADM

## 2018-06-21 RX ORDER — MORPHINE SULFATE 2 MG/ML
2 INJECTION, SOLUTION INTRAMUSCULAR; INTRAVENOUS EVERY 5 MIN PRN
Status: DISCONTINUED | OUTPATIENT
Start: 2018-06-21 | End: 2018-06-21 | Stop reason: HOSPADM

## 2018-06-21 RX ORDER — ONDANSETRON 2 MG/ML
4 INJECTION INTRAMUSCULAR; INTRAVENOUS
Status: DISCONTINUED | OUTPATIENT
Start: 2018-06-21 | End: 2018-06-21 | Stop reason: HOSPADM

## 2018-06-21 RX ORDER — LABETALOL HYDROCHLORIDE 5 MG/ML
5 INJECTION, SOLUTION INTRAVENOUS EVERY 5 MIN PRN
Status: DISCONTINUED | OUTPATIENT
Start: 2018-06-21 | End: 2018-06-21 | Stop reason: HOSPADM

## 2018-06-21 RX ORDER — HYDROCODONE BITARTRATE AND ACETAMINOPHEN 5; 325 MG/1; MG/1
1 TABLET ORAL EVERY 4 HOURS PRN
Status: DISCONTINUED | OUTPATIENT
Start: 2018-06-21 | End: 2018-06-24 | Stop reason: HOSPADM

## 2018-06-21 RX ORDER — ALVIMOPAN 12 MG/1
12 CAPSULE ORAL ONCE
Status: DISCONTINUED | OUTPATIENT
Start: 2018-06-21 | End: 2018-06-21 | Stop reason: CLARIF

## 2018-06-21 RX ORDER — ONDANSETRON 2 MG/ML
4 INJECTION INTRAMUSCULAR; INTRAVENOUS EVERY 4 HOURS PRN
Status: DISCONTINUED | OUTPATIENT
Start: 2018-06-21 | End: 2018-06-21 | Stop reason: HOSPADM

## 2018-06-21 RX ORDER — PROPOFOL 10 MG/ML
INJECTION, EMULSION INTRAVENOUS PRN
Status: DISCONTINUED | OUTPATIENT
Start: 2018-06-21 | End: 2018-06-21 | Stop reason: SDUPTHER

## 2018-06-21 RX ORDER — ALVIMOPAN 12 MG/1
12 CAPSULE ORAL 2 TIMES DAILY
Status: DISCONTINUED | OUTPATIENT
Start: 2018-06-21 | End: 2018-06-21 | Stop reason: CLARIF

## 2018-06-21 RX ORDER — HYDROCODONE BITARTRATE AND ACETAMINOPHEN 5; 325 MG/1; MG/1
2 TABLET ORAL EVERY 4 HOURS PRN
Status: DISCONTINUED | OUTPATIENT
Start: 2018-06-21 | End: 2018-06-24 | Stop reason: HOSPADM

## 2018-06-21 RX ORDER — MORPHINE SULFATE 4 MG/ML
4 INJECTION, SOLUTION INTRAMUSCULAR; INTRAVENOUS
Status: DISPENSED | OUTPATIENT
Start: 2018-06-21 | End: 2018-06-22

## 2018-06-21 RX ORDER — ROCURONIUM BROMIDE 10 MG/ML
INJECTION, SOLUTION INTRAVENOUS PRN
Status: DISCONTINUED | OUTPATIENT
Start: 2018-06-21 | End: 2018-06-21 | Stop reason: SDUPTHER

## 2018-06-21 RX ORDER — LABETALOL HYDROCHLORIDE 5 MG/ML
INJECTION, SOLUTION INTRAVENOUS PRN
Status: DISCONTINUED | OUTPATIENT
Start: 2018-06-21 | End: 2018-06-21 | Stop reason: SDUPTHER

## 2018-06-21 RX ADMIN — METRONIDAZOLE 500 MG: 500 INJECTION, SOLUTION INTRAVENOUS at 07:30

## 2018-06-21 RX ADMIN — EPHEDRINE SULFATE 30 MG: 50 INJECTION, SOLUTION INTRAVENOUS at 07:45

## 2018-06-21 RX ADMIN — SODIUM CHLORIDE, POTASSIUM CHLORIDE, SODIUM LACTATE AND CALCIUM CHLORIDE: 600; 310; 30; 20 INJECTION, SOLUTION INTRAVENOUS at 11:37

## 2018-06-21 RX ADMIN — FENTANYL CITRATE 150 MCG: 50 INJECTION INTRAMUSCULAR; INTRAVENOUS at 08:00

## 2018-06-21 RX ADMIN — SODIUM CHLORIDE, POTASSIUM CHLORIDE, SODIUM LACTATE AND CALCIUM CHLORIDE: 600; 310; 30; 20 INJECTION, SOLUTION INTRAVENOUS at 08:03

## 2018-06-21 RX ADMIN — ROCURONIUM BROMIDE 30 MG: 10 INJECTION, SOLUTION INTRAVENOUS at 07:39

## 2018-06-21 RX ADMIN — FENTANYL CITRATE 50 MCG: 50 INJECTION, SOLUTION INTRAMUSCULAR; INTRAVENOUS at 09:57

## 2018-06-21 RX ADMIN — CEFTRIAXONE SODIUM 1 G: 1 INJECTION, POWDER, FOR SOLUTION INTRAMUSCULAR; INTRAVENOUS at 07:30

## 2018-06-21 RX ADMIN — LABETALOL HYDROCHLORIDE 5 MG: 5 INJECTION, SOLUTION INTRAVENOUS at 08:11

## 2018-06-21 RX ADMIN — ONDANSETRON HYDROCHLORIDE 4 MG: 4 INJECTION, SOLUTION INTRAMUSCULAR; INTRAVENOUS at 09:30

## 2018-06-21 RX ADMIN — TAMSULOSIN HYDROCHLORIDE 0.4 MG: 0.4 CAPSULE ORAL at 13:46

## 2018-06-21 RX ADMIN — MORPHINE SULFATE 4 MG: 4 INJECTION INTRAVENOUS at 10:52

## 2018-06-21 RX ADMIN — INDOCYANINE GREEN AND WATER 7.5 MG: KIT at 08:55

## 2018-06-21 RX ADMIN — NEOSTIGMINE METHYLSULFATE 3 MG: 1 INJECTION, SOLUTION INTRAVENOUS at 09:30

## 2018-06-21 RX ADMIN — SODIUM CHLORIDE, POTASSIUM CHLORIDE, SODIUM LACTATE AND CALCIUM CHLORIDE: 600; 310; 30; 20 INJECTION, SOLUTION INTRAVENOUS at 21:11

## 2018-06-21 RX ADMIN — LIDOCAINE HYDROCHLORIDE 100 MG: 20 INJECTION, SOLUTION INFILTRATION; PERINEURAL at 07:39

## 2018-06-21 RX ADMIN — HYDROCODONE BITARTRATE AND ACETAMINOPHEN 1 TABLET: 5; 325 TABLET ORAL at 13:47

## 2018-06-21 RX ADMIN — EPHEDRINE SULFATE 10 MG: 50 INJECTION, SOLUTION INTRAVENOUS at 07:42

## 2018-06-21 RX ADMIN — FENTANYL CITRATE 100 MCG: 50 INJECTION INTRAMUSCULAR; INTRAVENOUS at 07:39

## 2018-06-21 RX ADMIN — Medication 0.2 MG: at 09:30

## 2018-06-21 RX ADMIN — Medication 0.2 MG: at 07:39

## 2018-06-21 RX ADMIN — PHENYLEPHRINE HYDROCHLORIDE 200 MCG: 10 INJECTION INTRAVENOUS at 07:43

## 2018-06-21 RX ADMIN — FAMOTIDINE 20 MG: 10 INJECTION, SOLUTION INTRAVENOUS at 19:52

## 2018-06-21 RX ADMIN — FENTANYL CITRATE 50 MCG: 50 INJECTION, SOLUTION INTRAMUSCULAR; INTRAVENOUS at 10:02

## 2018-06-21 RX ADMIN — FAMOTIDINE 20 MG: 10 INJECTION, SOLUTION INTRAVENOUS at 13:49

## 2018-06-21 RX ADMIN — PHENYLEPHRINE HYDROCHLORIDE 200 MCG: 10 INJECTION INTRAVENOUS at 07:45

## 2018-06-21 RX ADMIN — Medication 10 ML: at 19:52

## 2018-06-21 RX ADMIN — HYDROCODONE BITARTRATE AND ACETAMINOPHEN 1 TABLET: 5; 325 TABLET ORAL at 23:07

## 2018-06-21 RX ADMIN — LABETALOL HYDROCHLORIDE 5 MG: 5 INJECTION, SOLUTION INTRAVENOUS at 09:30

## 2018-06-21 RX ADMIN — EPHEDRINE SULFATE 10 MG: 50 INJECTION, SOLUTION INTRAVENOUS at 07:43

## 2018-06-21 RX ADMIN — PHENYLEPHRINE HYDROCHLORIDE 200 MCG: 10 INJECTION INTRAVENOUS at 07:44

## 2018-06-21 RX ADMIN — Medication 10 ML: at 13:50

## 2018-06-21 RX ADMIN — SODIUM CHLORIDE: 9 INJECTION, SOLUTION INTRAVENOUS at 06:56

## 2018-06-21 RX ADMIN — DEXAMETHASONE SODIUM PHOSPHATE 8 MG: 4 INJECTION, SOLUTION INTRAMUSCULAR; INTRAVENOUS at 07:39

## 2018-06-21 RX ADMIN — PROPOFOL 200 MG: 10 INJECTION, EMULSION INTRAVENOUS at 07:39

## 2018-06-21 ASSESSMENT — PAIN DESCRIPTION - FREQUENCY
FREQUENCY: CONTINUOUS

## 2018-06-21 ASSESSMENT — PULMONARY FUNCTION TESTS
PIF_VALUE: 14
PIF_VALUE: 26
PIF_VALUE: 26
PIF_VALUE: 2
PIF_VALUE: 2
PIF_VALUE: 26
PIF_VALUE: 2
PIF_VALUE: 26
PIF_VALUE: 26
PIF_VALUE: 1
PIF_VALUE: 26
PIF_VALUE: 26
PIF_VALUE: 19
PIF_VALUE: 26
PIF_VALUE: 14
PIF_VALUE: 26
PIF_VALUE: 26
PIF_VALUE: 17
PIF_VALUE: 26
PIF_VALUE: 43
PIF_VALUE: 3
PIF_VALUE: 15
PIF_VALUE: 21
PIF_VALUE: 13
PIF_VALUE: 21
PIF_VALUE: 13
PIF_VALUE: 26
PIF_VALUE: 17
PIF_VALUE: 4
PIF_VALUE: 13
PIF_VALUE: 26
PIF_VALUE: 1
PIF_VALUE: 26
PIF_VALUE: 17
PIF_VALUE: 26
PIF_VALUE: 2
PIF_VALUE: 26
PIF_VALUE: 14
PIF_VALUE: 2
PIF_VALUE: 26
PIF_VALUE: 2
PIF_VALUE: 26
PIF_VALUE: 26
PIF_VALUE: 2
PIF_VALUE: 15
PIF_VALUE: 26
PIF_VALUE: 26
PIF_VALUE: 21
PIF_VALUE: 26
PIF_VALUE: 25
PIF_VALUE: 14
PIF_VALUE: 2
PIF_VALUE: 14
PIF_VALUE: 26
PIF_VALUE: 1
PIF_VALUE: 26
PIF_VALUE: 19
PIF_VALUE: 19
PIF_VALUE: 26
PIF_VALUE: 26
PIF_VALUE: 13
PIF_VALUE: 2
PIF_VALUE: 14
PIF_VALUE: 25
PIF_VALUE: 26
PIF_VALUE: 20
PIF_VALUE: 26
PIF_VALUE: 13
PIF_VALUE: 13
PIF_VALUE: 26
PIF_VALUE: 2
PIF_VALUE: 26
PIF_VALUE: 26
PIF_VALUE: 14
PIF_VALUE: 3
PIF_VALUE: 26
PIF_VALUE: 26
PIF_VALUE: 27
PIF_VALUE: 2
PIF_VALUE: 26
PIF_VALUE: 26
PIF_VALUE: 2
PIF_VALUE: 26
PIF_VALUE: 25
PIF_VALUE: 21
PIF_VALUE: 26
PIF_VALUE: 14
PIF_VALUE: 26
PIF_VALUE: 27
PIF_VALUE: 14
PIF_VALUE: 26
PIF_VALUE: 2
PIF_VALUE: 3
PIF_VALUE: 26
PIF_VALUE: 19
PIF_VALUE: 16
PIF_VALUE: 26
PIF_VALUE: 2
PIF_VALUE: 15
PIF_VALUE: 15
PIF_VALUE: 27
PIF_VALUE: 14
PIF_VALUE: 19
PIF_VALUE: 26
PIF_VALUE: 16
PIF_VALUE: 2

## 2018-06-21 ASSESSMENT — PAIN DESCRIPTION - ORIENTATION
ORIENTATION: MID;RIGHT
ORIENTATION: MID;RIGHT
ORIENTATION: RIGHT;MID
ORIENTATION: MID;RIGHT

## 2018-06-21 ASSESSMENT — PAIN SCALES - GENERAL
PAINLEVEL_OUTOF10: 7
PAINLEVEL_OUTOF10: 5
PAINLEVEL_OUTOF10: 4
PAINLEVEL_OUTOF10: 5
PAINLEVEL_OUTOF10: 0
PAINLEVEL_OUTOF10: 4
PAINLEVEL_OUTOF10: 7
PAINLEVEL_OUTOF10: 4
PAINLEVEL_OUTOF10: 7
PAINLEVEL_OUTOF10: 4
PAINLEVEL_OUTOF10: 6
PAINLEVEL_OUTOF10: 4
PAINLEVEL_OUTOF10: 7
PAINLEVEL_OUTOF10: 5

## 2018-06-21 ASSESSMENT — PAIN DESCRIPTION - ONSET
ONSET: ON-GOING

## 2018-06-21 ASSESSMENT — PAIN DESCRIPTION - DESCRIPTORS
DESCRIPTORS: ACHING;SHARP
DESCRIPTORS: SHARP;ACHING
DESCRIPTORS: ACHING;SHARP
DESCRIPTORS: ACHING

## 2018-06-21 ASSESSMENT — PAIN DESCRIPTION - PAIN TYPE
TYPE: SURGICAL PAIN

## 2018-06-21 ASSESSMENT — PAIN DESCRIPTION - LOCATION
LOCATION: ABDOMEN

## 2018-06-21 ASSESSMENT — PAIN - FUNCTIONAL ASSESSMENT: PAIN_FUNCTIONAL_ASSESSMENT: 0-10

## 2018-06-21 ASSESSMENT — PAIN DESCRIPTION - PROGRESSION: CLINICAL_PROGRESSION: GRADUALLY IMPROVING

## 2018-06-22 LAB
ANION GAP SERPL CALCULATED.3IONS-SCNC: 13 MEQ/L (ref 8–16)
BUN BLDV-MCNC: 7 MG/DL (ref 7–22)
CALCIUM SERPL-MCNC: 8.8 MG/DL (ref 8.5–10.5)
CHLORIDE BLD-SCNC: 100 MEQ/L (ref 98–111)
CO2: 22 MEQ/L (ref 23–33)
CREAT SERPL-MCNC: 0.8 MG/DL (ref 0.4–1.2)
GFR SERPL CREATININE-BSD FRML MDRD: > 90 ML/MIN/1.73M2
GLUCOSE BLD-MCNC: 117 MG/DL (ref 70–108)
HCT VFR BLD CALC: 34.6 % (ref 42–52)
HEMOGLOBIN: 11.7 GM/DL (ref 14–18)
MAGNESIUM: 1.9 MG/DL (ref 1.6–2.4)
POTASSIUM REFLEX MAGNESIUM: 3.3 MEQ/L (ref 3.5–5.2)
SODIUM BLD-SCNC: 135 MEQ/L (ref 135–145)

## 2018-06-22 PROCEDURE — 2500000003 HC RX 250 WO HCPCS: Performed by: SURGERY

## 2018-06-22 PROCEDURE — 6360000002 HC RX W HCPCS: Performed by: SURGERY

## 2018-06-22 PROCEDURE — 2580000003 HC RX 258: Performed by: NURSE PRACTITIONER

## 2018-06-22 PROCEDURE — 80048 BASIC METABOLIC PNL TOTAL CA: CPT

## 2018-06-22 PROCEDURE — 6370000000 HC RX 637 (ALT 250 FOR IP): Performed by: NURSE PRACTITIONER

## 2018-06-22 PROCEDURE — 2580000003 HC RX 258: Performed by: SURGERY

## 2018-06-22 PROCEDURE — 1200000000 HC SEMI PRIVATE

## 2018-06-22 PROCEDURE — APPSS30 APP SPLIT SHARED TIME 16-30 MINUTES: Performed by: NURSE PRACTITIONER

## 2018-06-22 PROCEDURE — 85018 HEMOGLOBIN: CPT

## 2018-06-22 PROCEDURE — 36415 COLL VENOUS BLD VENIPUNCTURE: CPT

## 2018-06-22 PROCEDURE — 6370000000 HC RX 637 (ALT 250 FOR IP): Performed by: SURGERY

## 2018-06-22 PROCEDURE — S0028 INJECTION, FAMOTIDINE, 20 MG: HCPCS | Performed by: SURGERY

## 2018-06-22 PROCEDURE — 99024 POSTOP FOLLOW-UP VISIT: CPT | Performed by: NURSE PRACTITIONER

## 2018-06-22 PROCEDURE — 83735 ASSAY OF MAGNESIUM: CPT

## 2018-06-22 PROCEDURE — 85014 HEMATOCRIT: CPT

## 2018-06-22 RX ORDER — SIMVASTATIN 20 MG
20 TABLET ORAL NIGHTLY
Status: DISCONTINUED | OUTPATIENT
Start: 2018-06-22 | End: 2018-06-24 | Stop reason: HOSPADM

## 2018-06-22 RX ORDER — HYDROCHLOROTHIAZIDE 25 MG/1
12.5 TABLET ORAL DAILY
Status: DISCONTINUED | OUTPATIENT
Start: 2018-06-22 | End: 2018-06-24 | Stop reason: HOSPADM

## 2018-06-22 RX ORDER — POTASSIUM CHLORIDE 750 MG/1
40 TABLET, FILM COATED, EXTENDED RELEASE ORAL ONCE
Status: COMPLETED | OUTPATIENT
Start: 2018-06-22 | End: 2018-06-22

## 2018-06-22 RX ORDER — FINASTERIDE 5 MG/1
5 TABLET, FILM COATED ORAL DAILY
Status: DISCONTINUED | OUTPATIENT
Start: 2018-06-22 | End: 2018-06-24 | Stop reason: HOSPADM

## 2018-06-22 RX ORDER — LOSARTAN POTASSIUM 25 MG/1
25 TABLET ORAL EVERY MORNING
Status: DISCONTINUED | OUTPATIENT
Start: 2018-06-22 | End: 2018-06-24 | Stop reason: HOSPADM

## 2018-06-22 RX ADMIN — LOSARTAN POTASSIUM 25 MG: 25 TABLET, FILM COATED ORAL at 10:31

## 2018-06-22 RX ADMIN — HYDROCHLOROTHIAZIDE 12.5 MG: 25 TABLET ORAL at 10:31

## 2018-06-22 RX ADMIN — SODIUM CHLORIDE, POTASSIUM CHLORIDE, SODIUM LACTATE AND CALCIUM CHLORIDE: 600; 310; 30; 20 INJECTION, SOLUTION INTRAVENOUS at 06:34

## 2018-06-22 RX ADMIN — TAMSULOSIN HYDROCHLORIDE 0.4 MG: 0.4 CAPSULE ORAL at 10:32

## 2018-06-22 RX ADMIN — ENOXAPARIN SODIUM 40 MG: 40 INJECTION, SOLUTION INTRAVENOUS; SUBCUTANEOUS at 10:28

## 2018-06-22 RX ADMIN — HYOSCYAMINE SULFATE 125 MCG: 0.12 TABLET, ORALLY DISINTEGRATING ORAL at 20:07

## 2018-06-22 RX ADMIN — SIMVASTATIN 20 MG: 20 TABLET, FILM COATED ORAL at 20:08

## 2018-06-22 RX ADMIN — HYDROCODONE BITARTRATE AND ACETAMINOPHEN 1 TABLET: 5; 325 TABLET ORAL at 20:10

## 2018-06-22 RX ADMIN — Medication 10 ML: at 20:11

## 2018-06-22 RX ADMIN — NALOXEGOL OXALATE 25 MG: 25 TABLET, FILM COATED ORAL at 10:28

## 2018-06-22 RX ADMIN — FAMOTIDINE 20 MG: 10 INJECTION, SOLUTION INTRAVENOUS at 20:11

## 2018-06-22 RX ADMIN — FINASTERIDE 5 MG: 5 TABLET, FILM COATED ORAL at 10:31

## 2018-06-22 RX ADMIN — Medication 10 ML: at 10:28

## 2018-06-22 RX ADMIN — POTASSIUM CHLORIDE 40 MEQ: 750 TABLET, FILM COATED, EXTENDED RELEASE ORAL at 14:57

## 2018-06-22 RX ADMIN — SODIUM CHLORIDE, POTASSIUM CHLORIDE, SODIUM LACTATE AND CALCIUM CHLORIDE: 600; 310; 30; 20 INJECTION, SOLUTION INTRAVENOUS at 19:11

## 2018-06-22 RX ADMIN — HYDROCODONE BITARTRATE AND ACETAMINOPHEN 1 TABLET: 5; 325 TABLET ORAL at 13:59

## 2018-06-22 RX ADMIN — FAMOTIDINE 20 MG: 10 INJECTION, SOLUTION INTRAVENOUS at 10:27

## 2018-06-22 ASSESSMENT — PAIN SCALES - GENERAL
PAINLEVEL_OUTOF10: 3
PAINLEVEL_OUTOF10: 0
PAINLEVEL_OUTOF10: 4
PAINLEVEL_OUTOF10: 4
PAINLEVEL_OUTOF10: 2
PAINLEVEL_OUTOF10: 5
PAINLEVEL_OUTOF10: 0
PAINLEVEL_OUTOF10: 0

## 2018-06-22 ASSESSMENT — PAIN DESCRIPTION - LOCATION
LOCATION: ABDOMEN
LOCATION: ABDOMEN

## 2018-06-22 ASSESSMENT — PAIN DESCRIPTION - PAIN TYPE
TYPE: SURGICAL PAIN
TYPE: SURGICAL PAIN

## 2018-06-23 LAB
ANION GAP SERPL CALCULATED.3IONS-SCNC: 10 MEQ/L (ref 8–16)
BUN BLDV-MCNC: 6 MG/DL (ref 7–22)
CALCIUM SERPL-MCNC: 9.1 MG/DL (ref 8.5–10.5)
CHLORIDE BLD-SCNC: 103 MEQ/L (ref 98–111)
CO2: 28 MEQ/L (ref 23–33)
CREAT SERPL-MCNC: 0.9 MG/DL (ref 0.4–1.2)
GFR SERPL CREATININE-BSD FRML MDRD: 81 ML/MIN/1.73M2
GLUCOSE BLD-MCNC: 97 MG/DL (ref 70–108)
HCT VFR BLD CALC: 37.5 % (ref 42–52)
HEMOGLOBIN: 12.4 GM/DL (ref 14–18)
POTASSIUM SERPL-SCNC: 3.7 MEQ/L (ref 3.5–5.2)
SODIUM BLD-SCNC: 141 MEQ/L (ref 135–145)

## 2018-06-23 PROCEDURE — 85018 HEMOGLOBIN: CPT

## 2018-06-23 PROCEDURE — 6370000000 HC RX 637 (ALT 250 FOR IP): Performed by: SURGERY

## 2018-06-23 PROCEDURE — 36415 COLL VENOUS BLD VENIPUNCTURE: CPT

## 2018-06-23 PROCEDURE — L0625 LO FLEX L1-BELOW L5 PRE OTS: HCPCS

## 2018-06-23 PROCEDURE — 1200000000 HC SEMI PRIVATE

## 2018-06-23 PROCEDURE — 80048 BASIC METABOLIC PNL TOTAL CA: CPT

## 2018-06-23 PROCEDURE — APPSS30 APP SPLIT SHARED TIME 16-30 MINUTES: Performed by: NURSE PRACTITIONER

## 2018-06-23 PROCEDURE — 6370000000 HC RX 637 (ALT 250 FOR IP): Performed by: NURSE PRACTITIONER

## 2018-06-23 PROCEDURE — 6360000002 HC RX W HCPCS: Performed by: SURGERY

## 2018-06-23 PROCEDURE — 85014 HEMATOCRIT: CPT

## 2018-06-23 PROCEDURE — 99024 POSTOP FOLLOW-UP VISIT: CPT | Performed by: NURSE PRACTITIONER

## 2018-06-23 RX ORDER — HYDRALAZINE HYDROCHLORIDE 20 MG/ML
10 INJECTION INTRAMUSCULAR; INTRAVENOUS EVERY 6 HOURS PRN
Status: DISCONTINUED | OUTPATIENT
Start: 2018-06-23 | End: 2018-06-24 | Stop reason: HOSPADM

## 2018-06-23 RX ORDER — FAMOTIDINE 20 MG/1
20 TABLET, FILM COATED ORAL 2 TIMES DAILY
Status: DISCONTINUED | OUTPATIENT
Start: 2018-06-23 | End: 2018-06-24 | Stop reason: HOSPADM

## 2018-06-23 RX ADMIN — TAMSULOSIN HYDROCHLORIDE 0.4 MG: 0.4 CAPSULE ORAL at 08:20

## 2018-06-23 RX ADMIN — HYDROCHLOROTHIAZIDE 12.5 MG: 25 TABLET ORAL at 08:20

## 2018-06-23 RX ADMIN — FAMOTIDINE 20 MG: 20 TABLET ORAL at 21:01

## 2018-06-23 RX ADMIN — FINASTERIDE 5 MG: 5 TABLET, FILM COATED ORAL at 08:19

## 2018-06-23 RX ADMIN — ENOXAPARIN SODIUM 40 MG: 40 INJECTION, SOLUTION INTRAVENOUS; SUBCUTANEOUS at 08:19

## 2018-06-23 RX ADMIN — HYDROCODONE BITARTRATE AND ACETAMINOPHEN 1 TABLET: 5; 325 TABLET ORAL at 03:39

## 2018-06-23 RX ADMIN — SIMVASTATIN 20 MG: 20 TABLET, FILM COATED ORAL at 21:01

## 2018-06-23 RX ADMIN — LOSARTAN POTASSIUM 25 MG: 25 TABLET, FILM COATED ORAL at 08:20

## 2018-06-23 RX ADMIN — FAMOTIDINE 20 MG: 20 TABLET ORAL at 08:19

## 2018-06-23 RX ADMIN — HYDROCODONE BITARTRATE AND ACETAMINOPHEN 1 TABLET: 5; 325 TABLET ORAL at 21:01

## 2018-06-23 RX ADMIN — NALOXEGOL OXALATE 25 MG: 25 TABLET, FILM COATED ORAL at 08:20

## 2018-06-23 ASSESSMENT — PAIN SCALES - GENERAL
PAINLEVEL_OUTOF10: 3
PAINLEVEL_OUTOF10: 6
PAINLEVEL_OUTOF10: 0
PAINLEVEL_OUTOF10: 0
PAINLEVEL_OUTOF10: 2
PAINLEVEL_OUTOF10: 2

## 2018-06-23 ASSESSMENT — PAIN DESCRIPTION - PROGRESSION
CLINICAL_PROGRESSION: GRADUALLY IMPROVING

## 2018-06-23 ASSESSMENT — PAIN DESCRIPTION - ONSET: ONSET: ON-GOING

## 2018-06-23 ASSESSMENT — PAIN DESCRIPTION - LOCATION
LOCATION: ABDOMEN
LOCATION: ABDOMEN

## 2018-06-23 ASSESSMENT — PAIN DESCRIPTION - ORIENTATION: ORIENTATION: RIGHT;MID

## 2018-06-23 ASSESSMENT — PAIN DESCRIPTION - PAIN TYPE
TYPE: SURGICAL PAIN

## 2018-06-23 ASSESSMENT — PAIN DESCRIPTION - DESCRIPTORS
DESCRIPTORS: ACHING;DISCOMFORT;DULL
DESCRIPTORS: DISCOMFORT
DESCRIPTORS: DISCOMFORT;PRESSURE;SORE;TENDER
DESCRIPTORS: ACHING;DISCOMFORT

## 2018-06-24 VITALS
WEIGHT: 185.4 LBS | OXYGEN SATURATION: 95 % | BODY MASS INDEX: 26.54 KG/M2 | RESPIRATION RATE: 16 BRPM | HEIGHT: 70 IN | SYSTOLIC BLOOD PRESSURE: 146 MMHG | TEMPERATURE: 95.9 F | HEART RATE: 97 BPM | DIASTOLIC BLOOD PRESSURE: 65 MMHG

## 2018-06-24 PROCEDURE — 99024 POSTOP FOLLOW-UP VISIT: CPT | Performed by: NURSE PRACTITIONER

## 2018-06-24 ASSESSMENT — PAIN SCALES - GENERAL
PAINLEVEL_OUTOF10: 0
PAINLEVEL_OUTOF10: 0

## 2018-06-25 ENCOUNTER — TELEPHONE (OUTPATIENT)
Dept: SURGERY | Age: 78
End: 2018-06-25

## 2018-07-02 ENCOUNTER — OFFICE VISIT (OUTPATIENT)
Dept: SURGERY | Age: 78
End: 2018-07-02

## 2018-07-02 VITALS
OXYGEN SATURATION: 97 % | BODY MASS INDEX: 26.96 KG/M2 | TEMPERATURE: 98.1 F | DIASTOLIC BLOOD PRESSURE: 62 MMHG | SYSTOLIC BLOOD PRESSURE: 120 MMHG | RESPIRATION RATE: 16 BRPM | HEART RATE: 70 BPM | HEIGHT: 70 IN | WEIGHT: 188.3 LBS

## 2018-07-02 DIAGNOSIS — Z90.49 S/P RIGHT COLECTOMY: Primary | ICD-10-CM

## 2018-07-02 PROCEDURE — 99024 POSTOP FOLLOW-UP VISIT: CPT | Performed by: NURSE PRACTITIONER

## 2018-07-02 ASSESSMENT — ENCOUNTER SYMPTOMS
PHOTOPHOBIA: 0
RHINORRHEA: 0
EYE DISCHARGE: 0
RECTAL PAIN: 0
VOICE CHANGE: 0
COLOR CHANGE: 0
STRIDOR: 0
CHEST TIGHTNESS: 0
WHEEZING: 0
ABDOMINAL DISTENTION: 0
ANAL BLEEDING: 0
SINUS PRESSURE: 0
EYE REDNESS: 0
VOMITING: 0
CONSTIPATION: 0
FACIAL SWELLING: 0
DIARRHEA: 0
CHOKING: 0
EYE ITCHING: 0
EYE PAIN: 0
NAUSEA: 0
TROUBLE SWALLOWING: 0
SHORTNESS OF BREATH: 0
SORE THROAT: 0
APNEA: 0
COUGH: 0
BLOOD IN STOOL: 0
BACK PAIN: 0
ABDOMINAL PAIN: 0

## 2018-07-02 NOTE — PROGRESS NOTES
[facility]S  [facility]  [address] [address] [address]  [address] [address]  Dept: [phone]  Dept Fax: [phone]  Loc: [phone]    Visit Date: 7/2/2018    Noel Burton is a 77 y.o. male who presents today for:  Chief Complaint   Patient presents with    Post-Op Check     s/p Robotic right colectomy 6/21/18       HPI:     HPI    Jan Rawls is a 77year old male patient who presents for follow up status post robotic right colectomy 11 days ago with Dr. Amie Kee secondary to appendiceal mucinous neoplasm. Pathology revealed no evidence of residual cancer. He is still seeing oncology next week for appointment. Presents with wife. He is doing great. No complaints. Denies any surgical/abdominal pain. Never took anything for pain. Appetite is returning. No nausea or vomiting. Bowel function doing well. Going everyday. Stool softeners as needed. No melena or hematochezia. Urinating without issues. No fevers or chills. Incisions to abdomen are clean, dry and intact. No SOB or chest pain. Still feels fatigued, but getting energy level back. No signs of bleeding. No generalized abdominal pain. No calf pain or tenderness.      Past Medical History:   Diagnosis Date    Diabetes mellitus ([address]     diet and exercise controlled    History of kidney stones 1996    Hyperlipidemia     Hypertension       Past Surgical History:   Procedure Laterality Date    COLONOSCOPY  04/2018    Dr. Vasquez Peer Left 2014    [facility]    LITHOTRIPSY      [place] LAP,APPENDECTOMY N/A 5/31/2018    APPENDECTOMY LAPAROSCOPIC performed by Tristen Valencia MD at [place] [address], PARTIAL, W/ANAST Right 6/21/2018    ROBOTIC RIGHT COLECTOMY performed by Tristen Valencia MD at [address] [place] [address] Bilateral        Family History   Problem Relation Age of Onset    Diabetes Mother     Diabetes Sister        Social History   Substance Use Topics    Smoking status: Former Smoker     Years: 38.00     Quit date: 1993    Smokeless tobacco: Never Used    Alcohol use Yes      Comment: socially beer on weekends      Current Outpatient Prescriptions   Medication Sig Dispense Refill    MULTIPLE VITAMIN PO Take by mouth Takes 1/2 tablet every morning      finasteride (PROSCAR) 5 MG tablet Take 5 mg by mouth daily      tamsulosin (FLOMAX) 0.4 MG capsule Take 0.4 mg by mouth daily      hydrochlorothiazide (HYDRODIURIL) 25 MG tablet Take 12.5 mg by mouth daily       losartan (COZAAR) 50 MG tablet Take 25 mg by mouth every morning       simvastatin (ZOCOR) 40 MG tablet Take 20 mg by mouth nightly Takes 1/2 tablet at night       No current facility-administered medications for this visit. Allergies   Allergen Reactions    Lisinopril Other (See Comments)     coughing       Subjective:      Review of Systems   Constitutional: Positive for fatigue. Negative for activity change, appetite change, chills, diaphoresis, fever and unexpected weight change. HENT: Negative for congestion, dental problem, drooling, ear discharge, ear pain, facial swelling, hearing loss, mouth sores, nosebleeds, postnasal drip, rhinorrhea, sinus pressure, sneezing, sore throat, tinnitus, trouble swallowing and voice change. Eyes: Negative for photophobia, pain, discharge, redness, itching and visual disturbance. Respiratory: Negative for apnea, cough, choking, chest tightness, shortness of breath, wheezing and stridor. Cardiovascular: Negative for chest pain, palpitations and leg swelling. Gastrointestinal: Negative for abdominal distention, abdominal pain, anal bleeding, blood in stool, constipation, diarrhea, nausea, rectal pain and vomiting. Endocrine: Negative for cold intolerance, heat intolerance, polydipsia, polyphagia and polyuria.    Genitourinary: Negative for decreased urine volume, difficulty urinating, dysuria, enuresis, flank pain, frequency, genital sores, full 6 weeks after surgery. Continue to increase activity as tolerated. 5. Bowel function returned. Continue stool softeners as needed. 6. Follow up in 2-3 weeks. Signs and symptoms reviewed with patient that would be concerning and need him to return to office for re-evaluation. Patient states he will call if he has questions or concerns. 7. Pathology discussed with patient and wife again. No questions. Following up with oncology next week.      8. Follow up with PCP and GI as directed      Electronically signed by SERVANDO Martins CNP on 7/2/2018 at 4:48 PM

## 2018-07-05 ENCOUNTER — OFFICE VISIT (OUTPATIENT)
Dept: ONCOLOGY | Age: 78
End: 2018-07-05
Payer: MEDICARE

## 2018-07-05 ENCOUNTER — HOSPITAL ENCOUNTER (OUTPATIENT)
Dept: INFUSION THERAPY | Age: 78
Discharge: HOME | End: 2018-07-05
Payer: MEDICARE

## 2018-07-05 VITALS
OXYGEN SATURATION: 96 % | DIASTOLIC BLOOD PRESSURE: 66 MMHG | RESPIRATION RATE: 16 BRPM | BODY MASS INDEX: 26.69 KG/M2 | HEART RATE: 93 BPM | TEMPERATURE: 97.8 F | HEIGHT: 70 IN | SYSTOLIC BLOOD PRESSURE: 136 MMHG | WEIGHT: 186.4 LBS

## 2018-07-05 DIAGNOSIS — Z90.49 S/P RIGHT COLECTOMY: ICD-10-CM

## 2018-07-05 DIAGNOSIS — D37.3 APPENDICEAL TUMOR: Primary | ICD-10-CM

## 2018-07-05 PROCEDURE — G8427 DOCREV CUR MEDS BY ELIG CLIN: HCPCS | Performed by: INTERNAL MEDICINE

## 2018-07-05 PROCEDURE — 1123F ACP DISCUSS/DSCN MKR DOCD: CPT | Performed by: INTERNAL MEDICINE

## 2018-07-05 PROCEDURE — 1111F DSCHRG MED/CURRENT MED MERGE: CPT | Performed by: INTERNAL MEDICINE

## 2018-07-05 PROCEDURE — 1036F TOBACCO NON-USER: CPT | Performed by: INTERNAL MEDICINE

## 2018-07-05 PROCEDURE — 4040F PNEUMOC VAC/ADMIN/RCVD: CPT | Performed by: INTERNAL MEDICINE

## 2018-07-05 PROCEDURE — 99211 OFF/OP EST MAY X REQ PHY/QHP: CPT

## 2018-07-05 PROCEDURE — 99204 OFFICE O/P NEW MOD 45 MIN: CPT | Performed by: INTERNAL MEDICINE

## 2018-07-05 PROCEDURE — G8419 CALC BMI OUT NRM PARAM NOF/U: HCPCS | Performed by: INTERNAL MEDICINE

## 2018-07-05 PROCEDURE — 1101F PT FALLS ASSESS-DOCD LE1/YR: CPT | Performed by: INTERNAL MEDICINE

## 2018-07-05 NOTE — PROGRESS NOTES
[facility] PROFESSIONAL SERVS  [facility]  [address]  [address] [facility] [address]  [address] [address]  Dept: [phone]  Dept Fax: [phone]: [phone]     Visit Date: 7/5/2018     Edilberto Navarro is a 77 y.o. male who presents today for:   Chief Complaint   Patient presents with   [facility]     Low Grade Mucinous Neoplasma of Appendix        HPI:   He has a history of colon polyps.  He underwent colonoscopy demonstrated a few tubular adenoma polyps and enlarged swollen appendiceal orifice.  Therefore he had CT of the abdomen on April 23, 2018 that showed:  1.  There is diffuse dilatation of the appendix which measures 1.8 cm   in diameter. There is no additional evidence for acute appendicitis. There is no periappendiceal edema or appendicolith. 2.  Nonobstructing calculus in the lower pole of the left kidney. 3.  Mild enlargement of the prostate gland with mild diffuse   thickening of the wall of the urinary bladder which may represent   bladder out obstruction. 4.  Calcified granuloma in the left lung base. 5.  Moderate atherosclerosis of the abdominal aorta and iliac   arteries. 6.  Grade 1 anterolisthesis of L4 on L5.   7.  Severe degenerative disc disease at L5-S1. He underwent diagnostic laparoscopy with appendectomy May 31, 2018 secondary to an enlarged abnormal appearing appendix. Pathology demonstrated:   Low-grade appendiceal mucinous neoplasm, pTis(LAMN).   Proximal margin focally involved by low-grade mucinous appendiceal neoplasm. Due to positive margin on the June 21, 2018 he underwent right colon resection:   Negative for malignancy.   Pericolonic lymph nodes (12): Negative for malignancy. His postsurgical course is uncomplicated. He denies having any abdominal pain. No difficulty with bowel movements    He presents to the medical oncology clinic to discuss post surgical treatment.     HPI   Past Medical History:   Diagnosis Date    Diabetes

## 2018-07-18 ASSESSMENT — ENCOUNTER SYMPTOMS
SHORTNESS OF BREATH: 0
DIARRHEA: 0
NAUSEA: 0
CHEST TIGHTNESS: 0
TROUBLE SWALLOWING: 0
BACK PAIN: 0
SORE THROAT: 0
COLOR CHANGE: 0
ABDOMINAL PAIN: 0
ABDOMINAL DISTENTION: 0
FACIAL SWELLING: 0
EYE DISCHARGE: 0
BLOOD IN STOOL: 0
WHEEZING: 0
COUGH: 0
VOMITING: 0
RECTAL PAIN: 0
CONSTIPATION: 0

## 2018-07-30 ENCOUNTER — OFFICE VISIT (OUTPATIENT)
Dept: SURGERY | Age: 78
End: 2018-07-30

## 2018-07-30 VITALS
BODY MASS INDEX: 26.77 KG/M2 | DIASTOLIC BLOOD PRESSURE: 70 MMHG | TEMPERATURE: 97.2 F | RESPIRATION RATE: 18 BRPM | HEART RATE: 90 BPM | SYSTOLIC BLOOD PRESSURE: 128 MMHG | HEIGHT: 70 IN | OXYGEN SATURATION: 97 % | WEIGHT: 187 LBS

## 2018-07-30 DIAGNOSIS — Z90.49 S/P RIGHT COLECTOMY: Primary | ICD-10-CM

## 2018-07-30 PROCEDURE — 99024 POSTOP FOLLOW-UP VISIT: CPT | Performed by: NURSE PRACTITIONER

## 2018-07-30 ASSESSMENT — ENCOUNTER SYMPTOMS
BACK PAIN: 0
WHEEZING: 0
EYE REDNESS: 0
BLOOD IN STOOL: 0
PHOTOPHOBIA: 0
FACIAL SWELLING: 0
VOMITING: 0
CHEST TIGHTNESS: 0
ABDOMINAL DISTENTION: 0
DIARRHEA: 0
COLOR CHANGE: 0
STRIDOR: 0
RECTAL PAIN: 0
CHOKING: 0
SORE THROAT: 0
APNEA: 0
RHINORRHEA: 0
SINUS PRESSURE: 0
EYE PAIN: 0
NAUSEA: 0
VOICE CHANGE: 0
SHORTNESS OF BREATH: 0
ANAL BLEEDING: 0
CONSTIPATION: 0
ABDOMINAL PAIN: 0
EYE DISCHARGE: 0
TROUBLE SWALLOWING: 0
EYE ITCHING: 0
COUGH: 0

## 2018-07-30 NOTE — PROGRESS NOTES
[facility] PROFESSIONAL SERVS  [facility]  [address] [address] [address]  [address] [address]  Dept: [phone]  Dept Fax: [phone]  Loc: [phone]    Visit Date: 7/30/2018    Gaby Cross is a 77 y.o. male who presents today for:  Chief Complaint   Patient presents with    Post-Op Check     3 week f/u--s/p Robotic right colectomy 6/21/18       HPI:     HPI    Roberto Gaines is a 77year old male patient who presents for follow up status post robotic right colectomy 5 weeks ago with Dr. COPELAND [facility] secondary to appendiceal mucinous neoplasm. Pathology revealed no evidence of residual cancer. Saw oncology and just monitoring needed at this time. Next appointment will be in 5 months with them. Presents with wife. He is doing great. No complaints. Denies any surgical/abdominal pain. Off all medication for discomfort. He did develop some drainage from lower pubic incision about 10 days ago. He called the PCP and they cultured the wound. It was growing staph aureus and he was placed on Bactrim. He is tolerating antibiotics well. Still has another week left. Incision without redness or drainage at this time. Patient states it is less tender. All other robotic incisions are healed without issues. Appetite has returned. No nausea or vomiting. Bowel function doing well. Going everyday. Stool softeners as needed. No melena or hematochezia. Urinating without issues. No fevers or chills. No SOB or chest pain. Fatigue has improved. No signs of bleeding. No generalized abdominal pain. No calf pain or tenderness.      Past Medical History:   Diagnosis Date    Diabetes mellitus ([facility] [address]     diet and exercise controlled    History of kidney stones 1996    Hyperlipidemia     Hypertension       Past Surgical History:   Procedure Laterality Date    COLONOSCOPY  04/2018    Dr. Calli Mccullough Left 2014    [place]    LITHOTRIPSY      [place] LAP,APPENDECTOMY N/A 5/31/2018    APPENDECTOMY LAPAROSCOPIC performed by Dominga Cardoza MD at [facility], PARTIAL, W/ANAST Right 6/21/2018    ROBOTIC RIGHT COLECTOMY performed by Dominga [facility]za MD at [facility] Bilateral        Family History   Problem Relation Age of Onset    Diabetes Mother     Stroke Mother     Kidney Disease Father     Diabetes Sister     Breast Cancer Sister     Dementia Sister     Heart Disease Brother     Diabetes Brother     Stroke Brother        Social History   Substance Use Topics    Smoking status: Former Smoker     Packs/day: 1.00     Years: 38.00     Quit date: 1993    Smokeless tobacco: Never Used    Alcohol use Yes      Comment: socially beer on weekends      Current Outpatient Prescriptions   Medication Sig Dispense Refill    MULTIPLE VITAMIN PO Take by mouth Takes 1/2 tablet every morning      finasteride (PROSCAR) 5 MG tablet Take 5 mg by mouth daily      tamsulosin (FLOMAX) 0.4 MG capsule Take 0.4 mg by mouth daily      hydrochlorothiazide (HYDRODIURIL) 25 MG tablet Take 12.5 mg by mouth daily       losartan (COZAAR) 50 MG tablet Take 25 mg by mouth every morning       simvastatin (ZOCOR) 40 MG tablet Take 20 mg by mouth nightly Takes 1/2 tablet at night       No current facility-administered medications for this visit. Allergies   Allergen Reactions    Lisinopril Other (See Comments)     coughing       Subjective:      Review of Systems   Constitutional: Negative for activity change, appetite change, chills, diaphoresis, fatigue, fever and unexpected weight change. HENT: Negative for congestion, dental problem, drooling, ear discharge, ear pain, facial swelling, hearing loss, mouth sores, nosebleeds, postnasal drip, rhinorrhea, sinus pressure, sneezing, sore throat, tinnitus, trouble swallowing and voice change. Eyes: Negative for photophobia, pain, discharge, redness, itching and visual disturbance.    Respiratory: Negative for apnea, cough, choking, chest tightness, shortness of breath, wheezing and stridor. Cardiovascular: Negative for chest pain, palpitations and leg swelling. Gastrointestinal: Negative for abdominal distention, abdominal pain, anal bleeding, blood in stool, constipation, diarrhea, nausea, rectal pain and vomiting. Genitourinary: Negative for decreased urine volume, difficulty urinating, discharge, dysuria, enuresis, flank pain, frequency, genital sores, hematuria, penile pain, penile swelling, scrotal swelling, testicular pain and urgency. Musculoskeletal: Negative for arthralgias, back pain, gait problem, joint swelling, myalgias, neck pain and neck stiffness. Skin: Negative for color change, pallor, rash and wound. Neurological: Negative for dizziness, tremors, seizures, syncope, facial asymmetry, speech difficulty, weakness, light-headedness, numbness and headaches. Hematological: Negative for adenopathy. Does not bruise/bleed easily. Psychiatric/Behavioral: Negative for agitation, behavioral problems, confusion, decreased concentration, dysphoric mood, hallucinations, self-injury, sleep disturbance and suicidal ideas. The patient is not nervous/anxious and is not hyperactive. Objective:   /70 (Site: Left Arm, Position: Sitting, Cuff Size: Medium Adult)   Pulse 90   Temp 97.2 °F (36.2 °C) (Tympanic)   Resp 18   Ht 5' 10\" (1.778 m)   Wt 187 lb (84.8 kg)   SpO2 97%   BMI 26.83 kg/m²     Physical Exam   Constitutional: He is oriented to person, place, and time. He appears well-developed and well-nourished. Non-toxic appearance. He does not have a sickly appearance. He does not appear ill. No distress. HENT:   Head: Normocephalic and atraumatic. Right Ear: Hearing and external ear normal.   Left Ear: Hearing and external ear normal.   Nose: Nose normal.   Mouth/Throat: Oropharynx is clear and moist and mucous membranes are normal. Mucous membranes are not pale, not dry and not cyanotic.    Eyes: Lids are normal.   Neck: Trachea normal, normal range of motion and phonation normal. Neck supple. Cardiovascular: Normal rate, regular rhythm, S1 normal, S2 normal, intact distal pulses and normal pulses. Pulmonary/Chest: Effort normal and breath sounds normal. No accessory muscle usage. No apnea, no tachypnea and no bradypnea. No respiratory distress. He has no decreased breath sounds. He has no wheezes. He has no rales. He exhibits no tenderness. Abdominal: Soft. Normal appearance and bowel sounds are normal. He exhibits no distension and no mass. There is tenderness (lower pubic incision). Musculoskeletal: Normal range of motion. He exhibits no edema or tenderness. Neurological: He is alert and oriented to person, place, and time. He has normal strength and normal reflexes. He displays no atrophy and no tremor. He exhibits normal muscle tone. Coordination and gait normal.   Skin: Skin is warm, dry and intact. No abrasion, no bruising, no burn, no ecchymosis, no laceration, no lesion and no rash noted. No erythema. Psychiatric: He has a normal mood and affect. His speech is normal and behavior is normal. Thought content normal. Cognition and memory are normal.   Vitals reviewed. Patient Active Problem List   Diagnosis    Appendix disease    Appendiceal tumor    S/P right colectomy     Assessment:      1. Post op check status post robotic right colectomy secondary to appendical mucinous neoplasm     Plan:     1. Abdomen benign. Appetite returned.      2. All incisions healing well. No redness, swelling, or drainage. Continue wound care as directed. Continue antibiotics until completed.      3. Continue tylenol and ibuprofen as needed.      4. Continue lifting restrictions for the full 6 weeks after surgery. Continue to increase activity as tolerated. Discussion restrictions with patient and wife. Questions answered.      5. Bowel function doing well     6. Follow up as needed.  Signs and symptoms reviewed with patient that would be concerning and need him to return to office for re-evaluation. Patient states he will call if he has questions or concerns.     7.  Following up GI and oncology as directed      Electronically signed by SERVANDO Hair CNP on 7/30/2018 at 9:50 AM

## 2018-09-11 ENCOUNTER — OFFICE VISIT (OUTPATIENT)
Dept: UROLOGY | Age: 78
End: 2018-09-11
Payer: MEDICARE

## 2018-09-11 ENCOUNTER — TELEPHONE (OUTPATIENT)
Dept: UROLOGY | Age: 78
End: 2018-09-11

## 2018-09-11 ENCOUNTER — HOSPITAL ENCOUNTER (OUTPATIENT)
Dept: CT IMAGING | Age: 78
Discharge: HOME | End: 2018-09-11
Payer: MEDICARE

## 2018-09-11 VITALS
SYSTOLIC BLOOD PRESSURE: 136 MMHG | DIASTOLIC BLOOD PRESSURE: 70 MMHG | BODY MASS INDEX: 27.63 KG/M2 | HEIGHT: 70 IN | WEIGHT: 193 LBS

## 2018-09-11 DIAGNOSIS — I10 HYPERTENSION, UNSPECIFIED TYPE: ICD-10-CM

## 2018-09-11 DIAGNOSIS — N20.0 KIDNEY STONE: Primary | ICD-10-CM

## 2018-09-11 DIAGNOSIS — N40.0 BENIGN PROSTATIC HYPERPLASIA WITHOUT LOWER URINARY TRACT SYMPTOMS: ICD-10-CM

## 2018-09-11 DIAGNOSIS — Z01.818 PRE-OP TESTING: ICD-10-CM

## 2018-09-11 DIAGNOSIS — R33.9 URINE RETENTION: Primary | ICD-10-CM

## 2018-09-11 DIAGNOSIS — Z00.6 EXAMINATION FOR NORMAL COMPARISON FOR CLINICAL RESEARCH: ICD-10-CM

## 2018-09-11 LAB
BILIRUBIN URINE: NEGATIVE
BLOOD URINE, POC: ABNORMAL
CHARACTER, URINE: CLEAR
COLOR, URINE: YELLOW
GLUCOSE URINE: NEGATIVE MG/DL
KETONES, URINE: NEGATIVE
LEUKOCYTE CLUMPS, URINE: ABNORMAL
NITRITE, URINE: NEGATIVE
PH, URINE: 6.5
POST VOID RESIDUAL (PVR): 21 ML
PROTEIN, URINE: NEGATIVE MG/DL
SPECIFIC GRAVITY, URINE: 1.01 (ref 1–1.03)
UROBILINOGEN, URINE: 0.2 EU/DL

## 2018-09-11 PROCEDURE — G8419 CALC BMI OUT NRM PARAM NOF/U: HCPCS | Performed by: NURSE PRACTITIONER

## 2018-09-11 PROCEDURE — 81003 URINALYSIS AUTO W/O SCOPE: CPT | Performed by: NURSE PRACTITIONER

## 2018-09-11 PROCEDURE — G8427 DOCREV CUR MEDS BY ELIG CLIN: HCPCS | Performed by: NURSE PRACTITIONER

## 2018-09-11 PROCEDURE — 1036F TOBACCO NON-USER: CPT | Performed by: NURSE PRACTITIONER

## 2018-09-11 PROCEDURE — 1101F PT FALLS ASSESS-DOCD LE1/YR: CPT | Performed by: NURSE PRACTITIONER

## 2018-09-11 PROCEDURE — 1123F ACP DISCUSS/DSCN MKR DOCD: CPT | Performed by: NURSE PRACTITIONER

## 2018-09-11 PROCEDURE — 51798 US URINE CAPACITY MEASURE: CPT | Performed by: NURSE PRACTITIONER

## 2018-09-11 PROCEDURE — 4040F PNEUMOC VAC/ADMIN/RCVD: CPT | Performed by: NURSE PRACTITIONER

## 2018-09-11 PROCEDURE — 99214 OFFICE O/P EST MOD 30 MIN: CPT | Performed by: NURSE PRACTITIONER

## 2018-09-11 PROCEDURE — 3209999900 CT COMPARISON OF OUTSIDE FILMS

## 2018-09-11 NOTE — PROGRESS NOTES
Roxana Lynn is a 77 y.o. male was seen in follow up for urinary retention, bladder wall thickening on CT, and 7.7 mm nonobstructive left lower pole renal stone. Pt developed urinary retention following diagnostic lap with appendectomy 5/31/18 by Dr. Olga Rios. Pathology noted low-grade mucinous appendiceal neoplasm. Since last visit he underwent robotic right colectomy 6/21/18 with pathology negative for residual malignancy. 12 lymph nodes negative for malignancy. Pt underwent office cystoscopy by Dr. Poornima Cleveland 6/18/18 that noted a moderately obstructing prostate and trabeculations. He continues on finasteride and flomax. Lazaro Pill denies dysuria, hesitancy, weak stream, urgency, frequency, gross hematuria, flank pain, fever, chills, suprapubic pain, and feeling of incomplete emptying. He notes he is waking up 1-2 x per night. Current Outpatient Prescriptions   Medication Sig Dispense Refill    MULTIPLE VITAMIN PO Take by mouth Takes 1/2 tablet every morning      finasteride (PROSCAR) 5 MG tablet Take 5 mg by mouth daily      tamsulosin (FLOMAX) 0.4 MG capsule Take 0.4 mg by mouth daily      hydrochlorothiazide (HYDRODIURIL) 25 MG tablet Take 12.5 mg by mouth daily       losartan (COZAAR) 50 MG tablet Take 25 mg by mouth every morning       simvastatin (ZOCOR) 40 MG tablet Take 20 mg by mouth nightly Takes 1/2 tablet at night       No current facility-administered medications for this visit. Past Medical History  Lazaro Pill  has a past medical history of Diabetes mellitus ([address] History of kidney stones; Hyperlipidemia; and Hypertension. Past Surgical History  The patient  has a past surgical history that includes Lithotripsy; Retinal detachment surgery (Bilateral); Colonoscopy (04/2018); Inguinal hernia repair (Left, 2014); pr lap,appendectomy (N/A, 5/31/2018); pr lap,surg,colectomy, partial, w/anast (Right, 6/21/2018); and Appendectomy.     Family History  This patient's family extremities. Lymphadenopathy:        No cervical adenopathy. Bilateral supraclavicular adenopathy absent. Extremities: No cyanosis, clubbing, or edema present. Neurological:        Alert and oriented. No cranial nerve deficit. There are no focalizing motor or sensory deficits. CN II-XII are grossly intact. Psychiatric:        Normal mood and affect. Labs   Urine dip demonstrates   Results for POC orders placed in visit on 09/11/18   POCT Urinalysis No Micro (Auto)   Result Value Ref Range    Glucose, Ur Negative NEGATIVE mg/dl    Bilirubin Urine Negative     Ketones, Urine Negative NEGATIVE    Specific Gravity, Urine 1.010 1.002 - 1.03    Blood, UA POC Trace-intact NEGATIVE    pH, Urine 6.50 5.0 - 9.0    Protein, Urine Negative NEGATIVE mg/dl    Urobilinogen, Urine 0.20 0.0 - 1.0 eu/dl    Nitrite, Urine Negative NEGATIVE    Leukocyte Clumps, Urine Small (A) NEGATIVE    Color, Urine Yellow YELLOW-STR    Character, Urine Clear [id number]   poct post void residual   Result Value Ref Range    post void residual 21 ml       Patients recent PSA values are as follows  Lab Results   Component Value Date    PSA 1.63 08/05/2014    PSA 3.01 10/15/2013        Recent BUN/Creatinine:  Lab Results   Component Value Date    BUN 6 06/23/2018    CREATININE 0.9 06/23/2018     Radiology  The patient has had a CT of the abd/pelvis 4/23/18 which I have reviewed along with its accompanying report. The study demonstrates urologic findings of a 7.7 mm kidney stone in left lower pole and enlargement of the prostate gland with mild diffuse bladder wall thickening. Assessment & Plan  BPH with hx of urinary retention  Nonobstructive left renal stone--per outside report    Pt's urinary symptoms stable on flomax and finasteride. PVR 21 mls today. Pt believes he had PSA performed recently by PCP. Office to obtain results. LINCOLN today without nodule or induration. Pt with nonobstructive left renal stone.   Discussed

## 2018-09-11 NOTE — TELEPHONE ENCOUNTER
risk for surgery ___Risk Unacceptable-Communication to Follow  Comments:_____________________________________________________  ________________________________________________________________________  Physician ___________________________  Date:_______________  Physician Printed Name:  _________________________    Luke Boland  [phone]

## 2018-09-12 ENCOUNTER — TELEPHONE (OUTPATIENT)
Dept: UROLOGY | Age: 78
End: 2018-09-12

## 2018-09-12 DIAGNOSIS — N40.1 BENIGN PROSTATIC HYPERPLASIA WITH LOWER URINARY TRACT SYMPTOMS, SYMPTOM DETAILS UNSPECIFIED: Primary | ICD-10-CM

## 2018-09-12 NOTE — TELEPHONE ENCOUNTER
Spoke with patient's wife, Olga Oconnor (on HIPAA) and relayed message about needing PSA per [employer], CNP. She stated they are going to [facility] on 9/17/18 to have other bloodwork done, so they will have this done then. The order for the new PSA is already in Epic, so it should be accessible to [facility]. The order was mailed also, so that they had a copy, but let her know I wasn't sure she would get it prior to getting the bloodwork done.

## 2018-09-17 ENCOUNTER — HOSPITAL ENCOUNTER (OUTPATIENT)
Dept: GENERAL RADIOLOGY | Age: 78
Discharge: HOME | End: 2018-09-17
Payer: MEDICARE

## 2018-09-17 ENCOUNTER — HOSPITAL ENCOUNTER (OUTPATIENT)
Age: 78
Discharge: HOME | End: 2018-09-17
Payer: MEDICARE

## 2018-09-17 DIAGNOSIS — N40.0 BENIGN PROSTATIC HYPERPLASIA WITHOUT LOWER URINARY TRACT SYMPTOMS: ICD-10-CM

## 2018-09-17 DIAGNOSIS — N20.0 KIDNEY STONE: ICD-10-CM

## 2018-09-17 DIAGNOSIS — N40.1 BENIGN PROSTATIC HYPERPLASIA WITH LOWER URINARY TRACT SYMPTOMS, SYMPTOM DETAILS UNSPECIFIED: ICD-10-CM

## 2018-09-17 DIAGNOSIS — I10 HYPERTENSION, UNSPECIFIED TYPE: ICD-10-CM

## 2018-09-17 DIAGNOSIS — Z01.818 PRE-OP TESTING: ICD-10-CM

## 2018-09-17 LAB
AMORPHOUS: ABNORMAL
ANION GAP SERPL CALCULATED.3IONS-SCNC: 12 MEQ/L (ref 8–16)
BACTERIA: ABNORMAL
BASOPHILS # BLD: 1 %
BASOPHILS ABSOLUTE: 0.1 THOU/MM3 (ref 0–0.1)
BILIRUBIN URINE: NEGATIVE
BLOOD, URINE: ABNORMAL
BUN BLDV-MCNC: 14 MG/DL (ref 7–22)
CALCIUM SERPL-MCNC: 9.1 MG/DL (ref 8.5–10.5)
CASTS UA: ABNORMAL /LPF
CHARACTER, URINE: CLEAR
CHLORIDE BLD-SCNC: 104 MEQ/L (ref 98–111)
CO2: 25 MEQ/L (ref 23–33)
COLOR: YELLOW
CREAT SERPL-MCNC: 1 MG/DL (ref 0.4–1.2)
CRYSTALS, UA: ABNORMAL
EOSINOPHIL # BLD: 4.3 %
EOSINOPHILS ABSOLUTE: 0.3 THOU/MM3 (ref 0–0.4)
EPITHELIAL CELLS, UA: ABNORMAL /HPF
ERYTHROCYTE [DISTWIDTH] IN BLOOD BY AUTOMATED COUNT: 13.7 % (ref 11.5–14.5)
ERYTHROCYTE [DISTWIDTH] IN BLOOD BY AUTOMATED COUNT: 46.1 FL (ref 35–45)
GFR SERPL CREATININE-BSD FRML MDRD: 72 ML/MIN/1.73M2
GLUCOSE BLD-MCNC: 109 MG/DL (ref 70–108)
GLUCOSE, URINE: NEGATIVE MG/DL
HCT VFR BLD CALC: 39.3 % (ref 42–52)
HEMOGLOBIN: 13 GM/DL (ref 14–18)
IMMATURE GRANS (ABS): 0.01 THOU/MM3 (ref 0–0.07)
IMMATURE GRANULOCYTES: 0.1 %
KETONES, URINE: NEGATIVE
LEUKOCYTE ESTERASE, URINE: ABNORMAL
LYMPHOCYTES # BLD: 28.8 %
LYMPHOCYTES ABSOLUTE: 2 THOU/MM3 (ref 1–4.8)
MCH RBC QN AUTO: 30.3 PG (ref 26–33)
MCHC RBC AUTO-ENTMCNC: 33.1 GM/DL (ref 32.2–35.5)
MCV RBC AUTO: 91.6 FL (ref 80–94)
MONOCYTES # BLD: 12.4 %
MONOCYTES ABSOLUTE: 0.8 THOU/MM3 (ref 0.4–1.3)
MUCUS: ABNORMAL
NITRITE, URINE: NEGATIVE
NUCLEATED RED BLOOD CELLS: 0 /100 WBC
PH UA: 6.5 (ref 5–9)
PLATELET # BLD: 246 THOU/MM3 (ref 130–400)
PMV BLD AUTO: 9.9 FL (ref 9.4–12.4)
POTASSIUM SERPL-SCNC: 3.6 MEQ/L (ref 3.5–5.2)
PROSTATE SPECIFIC ANTIGEN: 3.9 NG/ML (ref 0–1)
PROTEIN UA: NEGATIVE MG/DL
RBC # BLD: 4.29 MILL/MM3 (ref 4.7–6.1)
RBC UA: ABNORMAL /HPF
REFLEX TO URINE C & S: ABNORMAL
SEG NEUTROPHILS: 53.4 %
SEGMENTED NEUTROPHILS ABSOLUTE COUNT: 3.6 THOU/MM3 (ref 1.8–7.7)
SODIUM BLD-SCNC: 141 MEQ/L (ref 135–145)
SPECIFIC GRAVITY UA: 1.01 (ref 1–1.03)
UROBILINOGEN, URINE: 0.2 EU/DL (ref 0–1)
WBC # BLD: 6.8 THOU/MM3 (ref 4.8–10.8)
WBC UA: ABNORMAL /HPF

## 2018-09-17 PROCEDURE — 71046 X-RAY EXAM CHEST 2 VIEWS: CPT

## 2018-09-17 PROCEDURE — 84153 ASSAY OF PSA TOTAL: CPT

## 2018-09-17 PROCEDURE — 81001 URINALYSIS AUTO W/SCOPE: CPT

## 2018-09-17 PROCEDURE — 85025 COMPLETE CBC W/AUTO DIFF WBC: CPT

## 2018-09-17 PROCEDURE — 87086 URINE CULTURE/COLONY COUNT: CPT

## 2018-09-17 PROCEDURE — 36415 COLL VENOUS BLD VENIPUNCTURE: CPT

## 2018-09-17 PROCEDURE — 80048 BASIC METABOLIC PNL TOTAL CA: CPT

## 2018-09-18 ENCOUNTER — TELEPHONE (OUTPATIENT)
Dept: UROLOGY | Age: 78
End: 2018-09-18

## 2018-09-18 DIAGNOSIS — R97.20 ELEVATED PSA: Primary | ICD-10-CM

## 2018-09-18 DIAGNOSIS — Z01.818 PREOPERATIVE EVALUATION TO RULE OUT SURGICAL CONTRAINDICATION: ICD-10-CM

## 2018-09-18 LAB
ORGANISM: ABNORMAL
URINE CULTURE REFLEX: ABNORMAL

## 2018-09-18 NOTE — TELEPHONE ENCOUNTER
Pt's PSA elevated to 3.9 from last value of 1.35 10/14/15. Noted PSA has varied from 1.35-6.17 since 2013. Please notify pt of his current PSA level and recommendation to repeat PSA in 2 months. Order placed--please mail to pt. Thank-you.

## 2018-09-18 NOTE — TELEPHONE ENCOUNTER
Pt's urine culture with mixed growth. Pt scheduled for left works on 10/17/18 by Dr. Ran Gaines. Please have him repeat a urine with reflex culture in 2 weeks to make sure no true infection prior to upcoming surgery.

## 2018-10-01 ENCOUNTER — TELEPHONE (OUTPATIENT)
Dept: UROLOGY | Age: 78
End: 2018-10-01

## 2018-10-07 ENCOUNTER — HOSPITAL ENCOUNTER (OUTPATIENT)
Age: 78
Discharge: HOME | End: 2018-10-07
Payer: MEDICARE

## 2018-10-07 DIAGNOSIS — Z01.818 PREOPERATIVE EVALUATION TO RULE OUT SURGICAL CONTRAINDICATION: ICD-10-CM

## 2018-10-07 LAB
BILIRUBIN URINE: NEGATIVE
BLOOD, URINE: NEGATIVE
CHARACTER, URINE: CLEAR
COLOR: YELLOW
GLUCOSE, URINE: NEGATIVE MG/DL
KETONES, URINE: NEGATIVE
LEUKOCYTE ESTERASE, URINE: NEGATIVE
NITRITE, URINE: NEGATIVE
PH UA: 6.5 (ref 5–9)
PROTEIN UA: NEGATIVE MG/DL
REFLEX TO URINE C & S: NORMAL
SPECIFIC GRAVITY UA: 1.01 (ref 1–1.03)
UROBILINOGEN, URINE: 0.2 EU/DL (ref 0–1)

## 2018-10-07 PROCEDURE — 81001 URINALYSIS AUTO W/SCOPE: CPT

## 2018-10-10 NOTE — PROGRESS NOTES
PAT call attempted patient unavailable left message with instructions    NPO after midnight  Bring insurance info and drivers license  Wear comfortable clean clothing  Do not bring jewelry   Shower night before and morning of surgery with a liquid antibacterial soap  Bring medications in original bottles  Follow all instructions give by your physician   needed at discharge  Call [phone] for any questions

## 2018-10-17 ENCOUNTER — ANESTHESIA (OUTPATIENT)
Dept: OPERATING ROOM | Age: 78
End: 2018-10-17
Payer: MEDICARE

## 2018-10-17 ENCOUNTER — ANESTHESIA EVENT (OUTPATIENT)
Dept: OPERATING ROOM | Age: 78
End: 2018-10-17
Payer: MEDICARE

## 2018-10-17 ENCOUNTER — HOSPITAL ENCOUNTER (OUTPATIENT)
Age: 78
Setting detail: OUTPATIENT SURGERY
Discharge: HOME | End: 2018-10-17
Attending: UROLOGY | Admitting: UROLOGY
Payer: MEDICARE

## 2018-10-17 VITALS
WEIGHT: 194 LBS | HEART RATE: 88 BPM | OXYGEN SATURATION: 98 % | RESPIRATION RATE: 16 BRPM | HEIGHT: 70 IN | DIASTOLIC BLOOD PRESSURE: 71 MMHG | SYSTOLIC BLOOD PRESSURE: 185 MMHG | TEMPERATURE: 96.5 F | BODY MASS INDEX: 27.77 KG/M2

## 2018-10-17 VITALS
OXYGEN SATURATION: 98 % | SYSTOLIC BLOOD PRESSURE: 97 MMHG | TEMPERATURE: 97.7 F | RESPIRATION RATE: 4 BRPM | DIASTOLIC BLOOD PRESSURE: 56 MMHG

## 2018-10-17 DIAGNOSIS — G89.18 POST-OP PAIN: Primary | ICD-10-CM

## 2018-10-17 LAB — POTASSIUM SERPL-SCNC: 3.9 MEQ/L (ref 3.5–5.2)

## 2018-10-17 PROCEDURE — 6370000000 HC RX 637 (ALT 250 FOR IP): Performed by: NURSE PRACTITIONER

## 2018-10-17 PROCEDURE — 2580000003 HC RX 258

## 2018-10-17 PROCEDURE — 2709999900 HC NON-CHARGEABLE SUPPLY: Performed by: UROLOGY

## 2018-10-17 PROCEDURE — 3700000000 HC ANESTHESIA ATTENDED CARE: Performed by: UROLOGY

## 2018-10-17 PROCEDURE — 2500000003 HC RX 250 WO HCPCS: Performed by: NURSE ANESTHETIST, CERTIFIED REGISTERED

## 2018-10-17 PROCEDURE — 7100000000 HC PACU RECOVERY - FIRST 15 MIN: Performed by: UROLOGY

## 2018-10-17 PROCEDURE — 84132 ASSAY OF SERUM POTASSIUM: CPT

## 2018-10-17 PROCEDURE — 2720000010 HC SURG SUPPLY STERILE: Performed by: UROLOGY

## 2018-10-17 PROCEDURE — 7100000010 HC PHASE II RECOVERY - FIRST 15 MIN: Performed by: UROLOGY

## 2018-10-17 PROCEDURE — C1769 GUIDE WIRE: HCPCS | Performed by: UROLOGY

## 2018-10-17 PROCEDURE — 52356 CYSTO/URETERO W/LITHOTRIPSY: CPT | Performed by: UROLOGY

## 2018-10-17 PROCEDURE — 6360000002 HC RX W HCPCS: Performed by: NURSE ANESTHETIST, CERTIFIED REGISTERED

## 2018-10-17 PROCEDURE — 6360000002 HC RX W HCPCS

## 2018-10-17 PROCEDURE — 3600000003 HC SURGERY LEVEL 3 BASE: Performed by: UROLOGY

## 2018-10-17 PROCEDURE — 36415 COLL VENOUS BLD VENIPUNCTURE: CPT

## 2018-10-17 PROCEDURE — 7100000001 HC PACU RECOVERY - ADDTL 15 MIN: Performed by: UROLOGY

## 2018-10-17 PROCEDURE — 3700000001 HC ADD 15 MINUTES (ANESTHESIA): Performed by: UROLOGY

## 2018-10-17 PROCEDURE — C2617 STENT, NON-COR, TEM W/O DEL: HCPCS | Performed by: UROLOGY

## 2018-10-17 PROCEDURE — 3600000013 HC SURGERY LEVEL 3 ADDTL 15MIN: Performed by: UROLOGY

## 2018-10-17 PROCEDURE — C1773 RET DEV, INSERTABLE: HCPCS | Performed by: UROLOGY

## 2018-10-17 PROCEDURE — 82365 CALCULUS SPECTROSCOPY: CPT

## 2018-10-17 PROCEDURE — 7100000011 HC PHASE II RECOVERY - ADDTL 15 MIN: Performed by: UROLOGY

## 2018-10-17 PROCEDURE — C1894 INTRO/SHEATH, NON-LASER: HCPCS | Performed by: UROLOGY

## 2018-10-17 DEVICE — VARIABLE LENGTH URETERAL STENT
Type: IMPLANTABLE DEVICE | Site: URETER | Status: FUNCTIONAL
Brand: CONTOUR VL™

## 2018-10-17 RX ORDER — OXYBUTYNIN CHLORIDE 5 MG/1
5 TABLET ORAL 3 TIMES DAILY PRN
Qty: 30 TABLET | Refills: 0 | Status: SHIPPED | OUTPATIENT
Start: 2018-10-17 | End: 2018-10-25

## 2018-10-17 RX ORDER — HYDROCODONE BITARTRATE AND ACETAMINOPHEN 5; 325 MG/1; MG/1
2 TABLET ORAL EVERY 4 HOURS PRN
Status: DISCONTINUED | OUTPATIENT
Start: 2018-10-17 | End: 2018-10-17 | Stop reason: HOSPADM

## 2018-10-17 RX ORDER — PROPOFOL 10 MG/ML
INJECTION, EMULSION INTRAVENOUS PRN
Status: DISCONTINUED | OUTPATIENT
Start: 2018-10-17 | End: 2018-10-17 | Stop reason: SDUPTHER

## 2018-10-17 RX ORDER — HYDROCODONE BITARTRATE AND ACETAMINOPHEN 5; 325 MG/1; MG/1
1 TABLET ORAL EVERY 4 HOURS PRN
Status: DISCONTINUED | OUTPATIENT
Start: 2018-10-17 | End: 2018-10-17 | Stop reason: HOSPADM

## 2018-10-17 RX ORDER — LABETALOL HYDROCHLORIDE 5 MG/ML
10 INJECTION, SOLUTION INTRAVENOUS EVERY 10 MIN PRN
Status: DISCONTINUED | OUTPATIENT
Start: 2018-10-17 | End: 2018-10-17 | Stop reason: HOSPADM

## 2018-10-17 RX ORDER — CIPROFLOXACIN 500 MG/1
500 TABLET, FILM COATED ORAL 2 TIMES DAILY
Qty: 6 TABLET | Refills: 0 | Status: SHIPPED | OUTPATIENT
Start: 2018-10-17 | End: 2018-10-20

## 2018-10-17 RX ORDER — MORPHINE SULFATE 2 MG/ML
4 INJECTION, SOLUTION INTRAMUSCULAR; INTRAVENOUS
Status: DISCONTINUED | OUTPATIENT
Start: 2018-10-17 | End: 2018-10-17 | Stop reason: HOSPADM

## 2018-10-17 RX ORDER — FENTANYL CITRATE 50 UG/ML
INJECTION, SOLUTION INTRAMUSCULAR; INTRAVENOUS PRN
Status: DISCONTINUED | OUTPATIENT
Start: 2018-10-17 | End: 2018-10-17 | Stop reason: SDUPTHER

## 2018-10-17 RX ORDER — ACETAMINOPHEN 325 MG/1
650 TABLET ORAL EVERY 4 HOURS PRN
Status: DISCONTINUED | OUTPATIENT
Start: 2018-10-17 | End: 2018-10-17 | Stop reason: HOSPADM

## 2018-10-17 RX ORDER — MORPHINE SULFATE 2 MG/ML
2 INJECTION, SOLUTION INTRAMUSCULAR; INTRAVENOUS
Status: DISCONTINUED | OUTPATIENT
Start: 2018-10-17 | End: 2018-10-17 | Stop reason: HOSPADM

## 2018-10-17 RX ORDER — CIPROFLOXACIN 2 MG/ML
400 INJECTION, SOLUTION INTRAVENOUS
Status: COMPLETED | OUTPATIENT
Start: 2018-10-17 | End: 2018-10-17

## 2018-10-17 RX ORDER — HYDROCODONE BITARTRATE AND ACETAMINOPHEN 5; 325 MG/1; MG/1
1 TABLET ORAL EVERY 6 HOURS PRN
Status: ON HOLD | COMMUNITY
End: 2018-10-17 | Stop reason: HOSPADM

## 2018-10-17 RX ORDER — HYDROCODONE BITARTRATE AND ACETAMINOPHEN 5; 325 MG/1; MG/1
1 TABLET ORAL EVERY 6 HOURS PRN
Qty: 10 TABLET | Refills: 0 | Status: SHIPPED | OUTPATIENT
Start: 2018-10-17 | End: 2018-10-20

## 2018-10-17 RX ORDER — FENTANYL CITRATE 50 UG/ML
50 INJECTION, SOLUTION INTRAMUSCULAR; INTRAVENOUS EVERY 5 MIN PRN
Status: DISCONTINUED | OUTPATIENT
Start: 2018-10-17 | End: 2018-10-17 | Stop reason: HOSPADM

## 2018-10-17 RX ORDER — MORPHINE SULFATE 2 MG/ML
2 INJECTION, SOLUTION INTRAMUSCULAR; INTRAVENOUS EVERY 5 MIN PRN
Status: DISCONTINUED | OUTPATIENT
Start: 2018-10-17 | End: 2018-10-17 | Stop reason: HOSPADM

## 2018-10-17 RX ORDER — SODIUM CHLORIDE 9 MG/ML
INJECTION, SOLUTION INTRAVENOUS CONTINUOUS
Status: DISCONTINUED | OUTPATIENT
Start: 2018-10-17 | End: 2018-10-17 | Stop reason: HOSPADM

## 2018-10-17 RX ORDER — ONDANSETRON 2 MG/ML
INJECTION INTRAMUSCULAR; INTRAVENOUS PRN
Status: DISCONTINUED | OUTPATIENT
Start: 2018-10-17 | End: 2018-10-17 | Stop reason: SDUPTHER

## 2018-10-17 RX ORDER — LIDOCAINE HYDROCHLORIDE 20 MG/ML
INJECTION, SOLUTION INFILTRATION; PERINEURAL PRN
Status: DISCONTINUED | OUTPATIENT
Start: 2018-10-17 | End: 2018-10-17 | Stop reason: SDUPTHER

## 2018-10-17 RX ADMIN — FENTANYL CITRATE 50 MCG: 50 INJECTION INTRAMUSCULAR; INTRAVENOUS at 08:08

## 2018-10-17 RX ADMIN — PHENYLEPHRINE HYDROCHLORIDE 100 MCG: 10 INJECTION INTRAVENOUS at 08:00

## 2018-10-17 RX ADMIN — PHENYLEPHRINE HYDROCHLORIDE 100 MCG: 10 INJECTION INTRAVENOUS at 07:55

## 2018-10-17 RX ADMIN — CIPROFLOXACIN 400 MG: 2 INJECTION, SOLUTION INTRAVENOUS at 07:58

## 2018-10-17 RX ADMIN — FENTANYL CITRATE 50 MCG: 50 INJECTION INTRAMUSCULAR; INTRAVENOUS at 07:45

## 2018-10-17 RX ADMIN — LIDOCAINE HYDROCHLORIDE 100 MG: 20 INJECTION, SOLUTION INFILTRATION; PERINEURAL at 07:48

## 2018-10-17 RX ADMIN — PROPOFOL 120 MG: 10 INJECTION, EMULSION INTRAVENOUS at 07:48

## 2018-10-17 RX ADMIN — PHENYLEPHRINE HYDROCHLORIDE 100 MCG: 10 INJECTION INTRAVENOUS at 07:53

## 2018-10-17 RX ADMIN — SODIUM CHLORIDE: 9 INJECTION, SOLUTION INTRAVENOUS at 06:43

## 2018-10-17 RX ADMIN — FENTANYL CITRATE 50 MCG: 50 INJECTION INTRAMUSCULAR; INTRAVENOUS at 08:06

## 2018-10-17 RX ADMIN — CIPROFLOXACIN 400 MG: 2 INJECTION, SOLUTION INTRAVENOUS at 07:49

## 2018-10-17 RX ADMIN — ONDANSETRON HYDROCHLORIDE 4 MG: 4 INJECTION, SOLUTION INTRAMUSCULAR; INTRAVENOUS at 08:12

## 2018-10-17 RX ADMIN — PHENYLEPHRINE HYDROCHLORIDE 100 MCG: 10 INJECTION INTRAVENOUS at 08:18

## 2018-10-17 RX ADMIN — HYDROCODONE BITARTRATE AND ACETAMINOPHEN 1 TABLET: 5; 325 TABLET ORAL at 10:01

## 2018-10-17 ASSESSMENT — PULMONARY FUNCTION TESTS
PIF_VALUE: 3
PIF_VALUE: 3
PIF_VALUE: 11
PIF_VALUE: 7
PIF_VALUE: 13
PIF_VALUE: 3
PIF_VALUE: 4
PIF_VALUE: 4
PIF_VALUE: 3
PIF_VALUE: 3
PIF_VALUE: 15
PIF_VALUE: 1
PIF_VALUE: 3
PIF_VALUE: 1
PIF_VALUE: 3
PIF_VALUE: 12
PIF_VALUE: 4
PIF_VALUE: 4
PIF_VALUE: 3
PIF_VALUE: 3
PIF_VALUE: 0
PIF_VALUE: 3
PIF_VALUE: 4
PIF_VALUE: 16
PIF_VALUE: 4
PIF_VALUE: 1
PIF_VALUE: 4
PIF_VALUE: 14
PIF_VALUE: 3
PIF_VALUE: 4
PIF_VALUE: 19
PIF_VALUE: 3
PIF_VALUE: 1
PIF_VALUE: 12

## 2018-10-17 ASSESSMENT — PAIN SCALES - GENERAL
PAINLEVEL_OUTOF10: 0
PAINLEVEL_OUTOF10: 4
PAINLEVEL_OUTOF10: 0
PAINLEVEL_OUTOF10: 0
PAINLEVEL_OUTOF10: 6
PAINLEVEL_OUTOF10: 0
PAINLEVEL_OUTOF10: 0

## 2018-10-17 ASSESSMENT — PAIN - FUNCTIONAL ASSESSMENT: PAIN_FUNCTIONAL_ASSESSMENT: 0-10

## 2018-10-17 ASSESSMENT — PAIN DESCRIPTION - LOCATION
LOCATION: PENIS
LOCATION: PENIS

## 2018-10-17 ASSESSMENT — PAIN DESCRIPTION - PAIN TYPE
TYPE: SURGICAL PAIN
TYPE: SURGICAL PAIN

## 2018-10-17 NOTE — PROGRESS NOTES
Patient feels the need to have to urinate again. States he has a lot of pressure and feels like he has urine stuck in his bladder. Up to the bathroom. Voided a little bit. Tolerated well. Pain meds given. Patient wants to ambulate in halls with daughter and wife by side.

## 2018-10-17 NOTE — PROGRESS NOTES
Discharge instructions given to patient and family. verbalized understanding.  Patient discharged via wheelchair with wife

## 2018-10-17 NOTE — ANESTHESIA PRE PROCEDURE
Results   Component Value Date     09/17/2018    K 3.6 09/17/2018    K 3.3 06/22/2018     09/17/2018    CO2 25 09/17/2018    BUN 14 09/17/2018    CREATININE 1.0 09/17/2018    LABGLOM 72 09/17/2018    GLUCOSE 109 09/17/2018    CALCIUM 9.1 09/17/2018       POC Tests: No results for input(s): POCGLU, POCNA, POCK, POCCL, POCBUN, POCHEMO, POCHCT in the last 72 hours. Coags: No results found for: PROTIME, INR, APTT    HCG (If Applicable): No results found for: PREGTESTUR, PREGSERUM, HCG, HCGQUANT     ABGs: No results found for: PHART, PO2ART, FLF6LPR, YDA0WQB, BEART, L3WJGBSF     Type & Screen (If Applicable):  Lab Results   Component Value Date    LABRH NEG 06/21/2018       Anesthesia Evaluation    Airway: Mallampati: II  TM distance: >3 FB   Neck ROM: full  Mouth opening: > = 3 FB Dental:          Pulmonary:   (+) decreased breath sounds,                             Cardiovascular:    (+) hypertension:,         Rhythm: regular                      Neuro/Psych:               GI/Hepatic/Renal:   (+) renal disease: kidney stones,           Endo/Other:    (+) Diabetes, . Abdominal:           Vascular:                                        Anesthesia Plan      general     ASA 3       Induction: intravenous. MIPS: Postoperative opioids intended and Prophylactic antiemetics administered. Anesthetic plan and risks discussed with patient and spouse. Plan discussed with CRNA.                   Elmo Serrano MD   10/17/2018

## 2018-10-17 NOTE — PROGRESS NOTES
Up to the bathroom. Voided a little. Tolerated well. Back to room. Patient states that the pain Is getting better. He is ready to get dressed.

## 2018-10-18 NOTE — OP NOTE
Kurtis Reece 59  RECORD OF OPERATION     PATIENT NAME: [facility]               MEDICAL RECORD NO. [id number]                DATE OF PROCEDURE: 10/17/2018  SURGEON: Lux Romo MD  PRIMARY CARE PHYSICIAN: Vinnie Guardado        PREOPERATIVE DIAGNOSIS: left renal stone     POSTOPERATIVE DIAGNOSIS: same with stones in lower pole calyx with calyceal stricture    PROCEDURE PERFORMED: cystoscopy with left ureteroscopy, laser lithotripsy, basket retrieval of stone fragments and placement of left ureteral stent     SURGEON: Phong Malone. Karen Romo MD    ASSISTANT(S): none     ANESTHESIA: general     BLOOD LOSS:  5 cc     SPECIMENS: stones for analysis     COMPLICATIONS:  None immediately appreciated. DISCUSSION:  Sung Duvall is a 77y.o.-year-old male who has a diagnosis of a left renal stone. After a history and physical examination was performed, potential diagnostic and therapeutic modalities were discussed with the patient. Left ureterorenoscopy was recommended and a discussion of the risks, possible complications, possible side effects, along with the anticipated benefits were reviewed. He was given the opportunity to ask questions. Once answered, informed consent was obtained. He was brought to the operating on 10/17/2018 for this procedure. PROCEDURE: Sung Duvall was taken to the OR and transferred to the operating room table. After initiation of general anesthesia the patient was placed in lithotomy position for cystoscopy. His groin was prepped and draped in the standard fashion for cystoscopy. A 22 Ecuadorean cystoscope was passed per urethra and the left ureteral orifice was seen. A dual-flex wire was passed through the orifice and up to the kidney under fluoroscopic guidance. The wire was left indwelling and the scope was removed. A ureteral access sheath was passed over the wire under fluoroscopic guidance and up to just below the level of the left UPJ.     A flexible ureteroscope was then

## 2018-10-18 NOTE — BRIEF OP NOTE
Brief Postoperative Note    Chelsea Davidson  YOB: 1940  [id number]    Pre-operative Diagnosis: left renal stone    Post-operative Diagnosis: same with stones stuck in narrowed lower pole calyx    Procedure:  cystoscopy with left ureterorenoscopy, laser lithotripsy, basket retrieval of stone fragments and placement of a left ureteral stent    Anesthesia: general    Surgeons/Assistants: Elmer Lorenz    Estimated Blood Loss: 5 cc    Complications: none    Specimens: stones for analysis    Findings: stones in lower pole of kidney broken and removed, stent placed, lower pole calyx with stricture, dilated and stent placed    Electronically signed by Elisabeth Rivera MD

## 2018-10-22 LAB — STONE ANALYSIS: NORMAL

## 2018-10-25 ENCOUNTER — PROCEDURE VISIT (OUTPATIENT)
Dept: UROLOGY | Age: 78
End: 2018-10-25
Payer: MEDICARE

## 2018-10-25 VITALS
SYSTOLIC BLOOD PRESSURE: 120 MMHG | BODY MASS INDEX: 28.49 KG/M2 | WEIGHT: 199 LBS | DIASTOLIC BLOOD PRESSURE: 72 MMHG | HEIGHT: 70 IN

## 2018-10-25 DIAGNOSIS — N20.0 RENAL STONES: Primary | ICD-10-CM

## 2018-10-25 LAB
BILIRUBIN URINE: NEGATIVE
BLOOD URINE, POC: ABNORMAL
CHARACTER, URINE: CLEAR
COLOR, URINE: YELLOW
GLUCOSE URINE: NEGATIVE MG/DL
KETONES, URINE: NEGATIVE
LEUKOCYTE CLUMPS, URINE: ABNORMAL
NITRITE, URINE: NEGATIVE
PH, URINE: 7
PROTEIN, URINE: 30 MG/DL
SPECIFIC GRAVITY, URINE: 1.02 (ref 1–1.03)
UROBILINOGEN, URINE: 0.2 EU/DL

## 2018-10-25 PROCEDURE — 81003 URINALYSIS AUTO W/O SCOPE: CPT | Performed by: UROLOGY

## 2018-10-25 PROCEDURE — 52310 CYSTOSCOPY AND TREATMENT: CPT | Performed by: UROLOGY

## 2018-10-25 PROCEDURE — 99999 PR OFFICE/OUTPT VISIT,PROCEDURE ONLY: CPT | Performed by: UROLOGY

## 2018-11-16 ENCOUNTER — HOSPITAL ENCOUNTER (OUTPATIENT)
Age: 78
Discharge: HOME | End: 2018-11-16
Payer: MEDICARE

## 2018-11-16 DIAGNOSIS — R97.20 ELEVATED PSA: ICD-10-CM

## 2018-11-16 LAB — PROSTATE SPECIFIC ANTIGEN: 4.88 NG/ML (ref 0–1)

## 2018-11-16 PROCEDURE — 36415 COLL VENOUS BLD VENIPUNCTURE: CPT

## 2018-11-16 PROCEDURE — 84153 ASSAY OF PSA TOTAL: CPT

## 2018-11-18 ENCOUNTER — TELEPHONE (OUTPATIENT)
Dept: UROLOGY | Age: 78
End: 2018-11-18

## 2018-11-18 DIAGNOSIS — R97.20 ELEVATED PSA: Primary | ICD-10-CM

## 2018-12-10 DIAGNOSIS — D37.3 APPENDICEAL TUMOR: Primary | ICD-10-CM

## 2018-12-11 ENCOUNTER — OFFICE VISIT (OUTPATIENT)
Dept: ONCOLOGY | Age: 78
End: 2018-12-11
Payer: MEDICARE

## 2018-12-11 ENCOUNTER — HOSPITAL ENCOUNTER (OUTPATIENT)
Dept: INFUSION THERAPY | Age: 78
Discharge: HOME | End: 2018-12-11
Payer: MEDICARE

## 2018-12-11 VITALS
WEIGHT: 195.4 LBS | DIASTOLIC BLOOD PRESSURE: 64 MMHG | HEIGHT: 70 IN | BODY MASS INDEX: 27.97 KG/M2 | RESPIRATION RATE: 18 BRPM | SYSTOLIC BLOOD PRESSURE: 140 MMHG | OXYGEN SATURATION: 96 % | TEMPERATURE: 97.5 F | HEART RATE: 86 BPM

## 2018-12-11 DIAGNOSIS — D37.3 APPENDICEAL TUMOR: ICD-10-CM

## 2018-12-11 DIAGNOSIS — C18.1 CANCER OF APPENDIX (HCC): Primary | ICD-10-CM

## 2018-12-11 DIAGNOSIS — Z90.49 S/P RIGHT COLECTOMY: ICD-10-CM

## 2018-12-11 LAB
ALBUMIN SERPL-MCNC: 4 G/DL (ref 3.5–5.1)
ALP BLD-CCNC: 53 U/L (ref 38–126)
ALT SERPL-CCNC: 16 U/L (ref 11–66)
AST SERPL-CCNC: 22 U/L (ref 5–40)
BASINOPHIL, AUTOMATED: 0 % (ref 0–3)
BILIRUB SERPL-MCNC: 0.4 MG/DL (ref 0.3–1.2)
BILIRUBIN DIRECT: < 0.2 MG/DL (ref 0–0.3)
BUN, WHOLE BLOOD: 12 MG/DL (ref 8–26)
CEA: 1.3 NG/ML (ref 0–5)
CHLORIDE, WHOLE BLOOD: 102 MEQ/L (ref 98–109)
CREATININE, WHOLE BLOOD: 1 MG/DL (ref 0.5–1.2)
EOSINOPHILS RELATIVE PERCENT: 4 % (ref 0–4)
GFR, ESTIMATED: 77 ML/MIN/1.73M2
GLUCOSE, WHOLE BLOOD: 104 MG/DL (ref 70–108)
HCT VFR BLD CALC: 39 % (ref 42–52)
HEMOGLOBIN: 12.9 GM/DL (ref 14–18)
IONIZED CALCIUM, WHOLE BLOOD: 1.18 MMOL/L (ref 1.12–1.32)
LYMPHOCYTES # BLD: 35 % (ref 15–47)
MCH RBC QN AUTO: 29.1 PG (ref 27–31)
MCHC RBC AUTO-ENTMCNC: 33.1 GM/DL (ref 33–37)
MCV RBC AUTO: 88 FL (ref 80–94)
MONOCYTES: 14 % (ref 0–12)
PDW BLD-RTO: 12.1 % (ref 11.5–14.5)
PLATELET # BLD: 219 THOU/MM3 (ref 130–400)
PMV BLD AUTO: 7 FL (ref 7.4–10.4)
POTASSIUM, WHOLE BLOOD: 3.8 MEQ/L (ref 3.5–4.9)
RBC # BLD: 4.44 MILL/MM3 (ref 4.7–6.1)
SEG NEUTROPHILS: 46 % (ref 43–75)
SODIUM, WHOLE BLOOD: 142 MEQ/L (ref 138–146)
TOTAL CO2, WHOLE BLOOD: 24 MEQ/L (ref 23–33)
TOTAL PROTEIN: 7.7 G/DL (ref 6.1–8)
WBC # BLD: 5.8 THOU/MM3 (ref 4.8–10.8)

## 2018-12-11 PROCEDURE — G8419 CALC BMI OUT NRM PARAM NOF/U: HCPCS | Performed by: INTERNAL MEDICINE

## 2018-12-11 PROCEDURE — 82378 CARCINOEMBRYONIC ANTIGEN: CPT

## 2018-12-11 PROCEDURE — 99213 OFFICE O/P EST LOW 20 MIN: CPT | Performed by: INTERNAL MEDICINE

## 2018-12-11 PROCEDURE — 85025 COMPLETE CBC W/AUTO DIFF WBC: CPT

## 2018-12-11 PROCEDURE — 4040F PNEUMOC VAC/ADMIN/RCVD: CPT | Performed by: INTERNAL MEDICINE

## 2018-12-11 PROCEDURE — 99211 OFF/OP EST MAY X REQ PHY/QHP: CPT

## 2018-12-11 PROCEDURE — G8427 DOCREV CUR MEDS BY ELIG CLIN: HCPCS | Performed by: INTERNAL MEDICINE

## 2018-12-11 PROCEDURE — G8484 FLU IMMUNIZE NO ADMIN: HCPCS | Performed by: INTERNAL MEDICINE

## 2018-12-11 PROCEDURE — 80047 BASIC METABLC PNL IONIZED CA: CPT

## 2018-12-11 PROCEDURE — 1036F TOBACCO NON-USER: CPT | Performed by: INTERNAL MEDICINE

## 2018-12-11 PROCEDURE — 1123F ACP DISCUSS/DSCN MKR DOCD: CPT | Performed by: INTERNAL MEDICINE

## 2018-12-11 PROCEDURE — 1101F PT FALLS ASSESS-DOCD LE1/YR: CPT | Performed by: INTERNAL MEDICINE

## 2018-12-11 PROCEDURE — 80076 HEPATIC FUNCTION PANEL: CPT

## 2018-12-11 PROCEDURE — 36415 COLL VENOUS BLD VENIPUNCTURE: CPT

## 2018-12-11 ASSESSMENT — ENCOUNTER SYMPTOMS
EYE DISCHARGE: 0
FACIAL SWELLING: 0
CHEST TIGHTNESS: 0
COUGH: 0
SHORTNESS OF BREATH: 0
ABDOMINAL PAIN: 0
BLOOD IN STOOL: 0
NAUSEA: 0
WHEEZING: 0
CONSTIPATION: 0
VOMITING: 0
SORE THROAT: 0
COLOR CHANGE: 0
DIARRHEA: 0
RECTAL PAIN: 0
TROUBLE SWALLOWING: 0
ABDOMINAL DISTENTION: 0
BACK PAIN: 0

## 2018-12-11 NOTE — PATIENT INSTRUCTIONS
1.  T of the chest and abdomen in the next 1-2 weeks  2.   RTC to see me and 4 labs: CBC, BMP, LFTs, CEA in 6 months

## 2018-12-12 ENCOUNTER — TELEPHONE (OUTPATIENT)
Dept: UROLOGY | Age: 78
End: 2018-12-12

## 2018-12-12 NOTE — TELEPHONE ENCOUNTER
I called the patient and spoke with his wife Kimberly Sherwood on [facility]. I advised her of the litholink results and recommendation. I sent a copy of the results and recommendations with a low sodium dietary education sheet via mail. She voiced understanding.

## 2018-12-20 ENCOUNTER — HOSPITAL ENCOUNTER (OUTPATIENT)
Dept: CT IMAGING | Age: 78
Discharge: HOME | End: 2018-12-20
Payer: MEDICARE

## 2018-12-20 DIAGNOSIS — C18.1 CANCER OF APPENDIX (HCC): ICD-10-CM

## 2018-12-20 PROCEDURE — 6360000004 HC RX CONTRAST MEDICATION: Performed by: INTERNAL MEDICINE

## 2018-12-20 PROCEDURE — 74177 CT ABD & PELVIS W/CONTRAST: CPT

## 2018-12-20 PROCEDURE — 71260 CT THORAX DX C+: CPT

## 2018-12-20 RX ADMIN — IOHEXOL 50 ML: 240 INJECTION, SOLUTION INTRATHECAL; INTRAVASCULAR; INTRAVENOUS; ORAL at 09:54

## 2018-12-20 RX ADMIN — IOPAMIDOL 100 ML: 755 INJECTION, SOLUTION INTRAVENOUS at 09:54

## 2019-01-17 ENCOUNTER — HOSPITAL ENCOUNTER (OUTPATIENT)
Age: 79
Discharge: HOME | End: 2019-01-17
Payer: MEDICARE

## 2019-01-17 DIAGNOSIS — R97.20 ELEVATED PSA: ICD-10-CM

## 2019-01-17 LAB — PROSTATE SPECIFIC ANTIGEN: 4.57 NG/ML (ref 0–1)

## 2019-01-17 PROCEDURE — 84153 ASSAY OF PSA TOTAL: CPT

## 2019-01-17 PROCEDURE — 36415 COLL VENOUS BLD VENIPUNCTURE: CPT

## 2019-01-18 ENCOUNTER — TELEPHONE (OUTPATIENT)
Dept: UROLOGY | Age: 79
End: 2019-01-18

## 2019-01-18 DIAGNOSIS — R97.20 ELEVATED PSA: Primary | ICD-10-CM

## 2019-03-14 ENCOUNTER — HOSPITAL ENCOUNTER (OUTPATIENT)
Age: 79
Discharge: HOME | End: 2019-03-14
Payer: MEDICARE

## 2019-03-14 DIAGNOSIS — R97.20 ELEVATED PSA: ICD-10-CM

## 2019-03-14 LAB — PROSTATE SPECIFIC ANTIGEN: 4.34 NG/ML (ref 0–1)

## 2019-03-14 PROCEDURE — 84153 ASSAY OF PSA TOTAL: CPT

## 2019-03-14 PROCEDURE — 36415 COLL VENOUS BLD VENIPUNCTURE: CPT

## 2019-03-15 ENCOUNTER — TELEPHONE (OUTPATIENT)
Dept: UROLOGY | Age: 79
End: 2019-03-15

## 2019-05-10 DIAGNOSIS — C18.1 CANCER OF APPENDIX (HCC): Primary | ICD-10-CM

## 2019-05-13 ENCOUNTER — OFFICE VISIT (OUTPATIENT)
Dept: ONCOLOGY | Age: 79
End: 2019-05-13
Payer: MEDICARE

## 2019-05-13 ENCOUNTER — HOSPITAL ENCOUNTER (OUTPATIENT)
Dept: INFUSION THERAPY | Age: 79
Discharge: HOME | End: 2019-05-13
Payer: MEDICARE

## 2019-05-13 VITALS
WEIGHT: 208 LBS | TEMPERATURE: 99.1 F | HEIGHT: 70 IN | SYSTOLIC BLOOD PRESSURE: 143 MMHG | BODY MASS INDEX: 29.78 KG/M2 | HEART RATE: 72 BPM | OXYGEN SATURATION: 96 % | DIASTOLIC BLOOD PRESSURE: 67 MMHG | RESPIRATION RATE: 16 BRPM

## 2019-05-13 DIAGNOSIS — C18.1 CANCER OF APPENDIX (HCC): ICD-10-CM

## 2019-05-13 DIAGNOSIS — C18.1 CANCER OF APPENDIX (HCC): Primary | ICD-10-CM

## 2019-05-13 DIAGNOSIS — Z90.49 S/P RIGHT COLECTOMY: ICD-10-CM

## 2019-05-13 LAB
BASINOPHIL, AUTOMATED: 0 % (ref 0–3)
BUN, WHOLE BLOOD: 15 MG/DL (ref 8–26)
CEA: 1.4 NG/ML (ref 0–5)
CHLORIDE, WHOLE BLOOD: 101 MEQ/L (ref 98–109)
CREATININE, WHOLE BLOOD: 1.1 MG/DL (ref 0.5–1.2)
EOSINOPHILS RELATIVE PERCENT: 4 % (ref 0–4)
GFR, ESTIMATED: 68 ML/MIN/1.73M2
GLUCOSE, WHOLE BLOOD: 85 MG/DL (ref 70–108)
HCT VFR BLD CALC: 39.2 % (ref 42–52)
HEMOGLOBIN: 13.2 GM/DL (ref 14–18)
IONIZED CALCIUM, WHOLE BLOOD: 1.18 MMOL/L (ref 1.12–1.32)
LYMPHOCYTES # BLD: 35 % (ref 15–47)
MCH RBC QN AUTO: 29.8 PG (ref 27–31)
MCHC RBC AUTO-ENTMCNC: 33.6 GM/DL (ref 33–37)
MCV RBC AUTO: 89 FL (ref 80–94)
MONOCYTES: 12 % (ref 0–12)
PDW BLD-RTO: 11.7 % (ref 11.5–14.5)
PLATELET # BLD: 230 THOU/MM3 (ref 130–400)
PMV BLD AUTO: 7 FL (ref 7.4–10.4)
POTASSIUM, WHOLE BLOOD: 3.9 MEQ/L (ref 3.5–4.9)
RBC # BLD: 4.42 MILL/MM3 (ref 4.7–6.1)
SEG NEUTROPHILS: 50 % (ref 43–75)
SODIUM, WHOLE BLOOD: 141 MEQ/L (ref 138–146)
TOTAL CO2, WHOLE BLOOD: 27 MEQ/L (ref 23–33)
WBC # BLD: 6.2 THOU/MM3 (ref 4.8–10.8)

## 2019-05-13 PROCEDURE — G8419 CALC BMI OUT NRM PARAM NOF/U: HCPCS | Performed by: INTERNAL MEDICINE

## 2019-05-13 PROCEDURE — 85025 COMPLETE CBC W/AUTO DIFF WBC: CPT

## 2019-05-13 PROCEDURE — 4040F PNEUMOC VAC/ADMIN/RCVD: CPT | Performed by: INTERNAL MEDICINE

## 2019-05-13 PROCEDURE — 1123F ACP DISCUSS/DSCN MKR DOCD: CPT | Performed by: INTERNAL MEDICINE

## 2019-05-13 PROCEDURE — 82378 CARCINOEMBRYONIC ANTIGEN: CPT

## 2019-05-13 PROCEDURE — 99211 OFF/OP EST MAY X REQ PHY/QHP: CPT

## 2019-05-13 PROCEDURE — 80047 BASIC METABLC PNL IONIZED CA: CPT

## 2019-05-13 PROCEDURE — 99213 OFFICE O/P EST LOW 20 MIN: CPT | Performed by: INTERNAL MEDICINE

## 2019-05-13 PROCEDURE — 1036F TOBACCO NON-USER: CPT | Performed by: INTERNAL MEDICINE

## 2019-05-13 PROCEDURE — G8427 DOCREV CUR MEDS BY ELIG CLIN: HCPCS | Performed by: INTERNAL MEDICINE

## 2019-05-13 PROCEDURE — 36415 COLL VENOUS BLD VENIPUNCTURE: CPT

## 2019-05-13 RX ORDER — ACETAMINOPHEN 160 MG
1 TABLET,DISINTEGRATING ORAL 2 TIMES DAILY
COMMUNITY

## 2019-05-13 ASSESSMENT — ENCOUNTER SYMPTOMS
SORE THROAT: 0
NAUSEA: 0
TROUBLE SWALLOWING: 0
ABDOMINAL DISTENTION: 0
BLOOD IN STOOL: 0
COUGH: 0
WHEEZING: 0
VOMITING: 0
RECTAL PAIN: 0
FACIAL SWELLING: 0
BACK PAIN: 0
ABDOMINAL PAIN: 0
CHEST TIGHTNESS: 0
EYE DISCHARGE: 0
SHORTNESS OF BREATH: 0
COLOR CHANGE: 0
DIARRHEA: 0
CONSTIPATION: 0

## 2019-05-13 NOTE — PROGRESS NOTES
[facility]ESSIONAL [facility]  [facility]  [address]  [place] [address]  [address] [address]  Dept: [phone]  Dept Fax: [phone]: [phone]     Visit Date: 5/13/2019     Haja Serrano is a [de-identified] y.o. male who presents today for:   Chief Complaint   Patient presents with    Follow-up     Cancer of Appendix        HPI:   He has a history of colon polyps.  He underwent colonoscopy demonstrated  few tubular adenoma polyps and enlarged swollen appendiceal orifice.  Therefore he had CT of the abdomen on April 23, 2018 that showed:  1.  There is diffuse dilatation of the appendix which measures 1.8 cm   in diameter. There is no additional evidence for acute appendicitis. There is no periappendiceal edema or appendicolith. 2.  Nonobstructing calculus in the lower pole of the left kidney. 3.  Mild enlargement of the prostate gland with mild diffuse   thickening of the wall of the urinary bladder which may represent   bladder out obstruction. 4.  Calcified granuloma in the left lung base. 5.  Moderate atherosclerosis of the abdominal aorta and iliac   arteries. 6.  Grade 1 anterolisthesis of L4 on L5.   7.  Severe degenerative disc disease at L5-S1. He underwent diagnostic laparoscopy with appendectomy on May 31, 2018. Pathology demonstrated:   Low-grade appendiceal mucinous neoplasm, pTis(LAMN).   Proximal margin focally involved by low-grade mucinous appendiceal neoplasm. Due to positive margin on the June 21, 2018 he underwent right colon resection:   Negative for malignancy.   Pericolonic lymph nodes (12): Negative for malignancy. His postsurgical course was uncomplicated. Interim history on MAY 13, 2019:   Presents to the medical oncology clinic for 6 months follow-up visit. He denies having any abdominal pain. No difficulty with bowel movements. His appetite is fine and his weight is stable. Remaining 12 point review of system is negative.     HPI Past Medical History:   Diagnosis Date    Diabetes mellitus ([facility] [address]     diet and exercise controlled    History of kidney stones     Hyperlipidemia     Hypertension       Past Surgical History:   Procedure Laterality Date    APPENDECTOMY      COLONOSCOPY  2018    Dr. Salvatore Nuñez Left     [place], [place]    LITHOTRIPSY      [facility] LAP,APPENDECTOMY N/A 2018    APPENDECTOMY LAPAROSCOPIC performed by Lena Garcia MD at [facility], PARTIAL, W/ANAST Right 2018    ROBOTIC RIGHT COLECTOMY performed by Lena Garcia MD at [address] [place] [address] OFFICE/OUTPT [address] [place] [address] N/A 10/17/2018    CYSTO, LEFT URETEROSCOPY, LASER LITHOTRIPSY, BASKET RETRIEVAL OF STONES, STENT PLACEMENT performed by Rashida Kate MD at [place] [address] Bilateral       Family History   Problem Relation Age of Onset    Diabetes Mother     Stroke Mother     Kidney Disease Father     Diabetes Sister     Breast Cancer Sister     Dementia Sister     Heart Disease Brother     Diabetes Brother     Stroke Brother       Social History     Tobacco Use    Smoking status: Former Smoker     Packs/day: 1.00     Years: 38.00     Pack years: 38.00     Last attempt to quit:      Years since quittin.4    Smokeless tobacco: Never Used   Substance Use Topics    Alcohol use: Yes     Comment: socially beer on weekends      Current Outpatient Medications   Medication Sig Dispense Refill    Cholecalciferol (VITAMIN D3) 2000 units CAPS Take 1 capsule by mouth daily      MULTIPLE VITAMIN PO Take by mouth Takes 1/2 tablet every morning      finasteride (PROSCAR) 5 MG tablet Take 5 mg by mouth daily      tamsulosin (FLOMAX) 0.4 MG capsule Take 0.4 mg by mouth daily      hydrochlorothiazide (HYDRODIURIL) 25 MG tablet Take 12.5 mg by mouth daily       losartan (COZAAR) 50 MG tablet Take 25 mg by mouth every morning       simvastatin (ZOCOR) 40 MG tablet Take 20 mg by mouth nightly Takes 1/2 tablet at night       No current facility-administered medications for this visit. Allergies   Allergen Reactions    Lisinopril Other (See Comments)     coughing  Other reaction(s): Cough      Health Maintenance   Topic Date Due    DTaP/Tdap/Td vaccine (1 - Tdap) 05[birth year]10/1958    Shingles Vaccine (1 of 2) 05/10/1989    Creatinine monitoring  09/17/2019    Potassium monitoring  10/17/2019    Flu vaccine  Completed    Pneumococcal 65+ years Vaccine  Completed        Subjective:   Review of Systems   Constitutional: Negative for activity change, appetite change, fatigue and fever. HENT: Negative for congestion, dental problem, facial swelling, hearing loss, mouth sores, nosebleeds, sore throat, tinnitus and trouble swallowing. Eyes: Negative for discharge and visual disturbance. Respiratory: Negative for cough, chest tightness, shortness of breath and wheezing. Cardiovascular: Negative for chest pain, palpitations and leg swelling. Gastrointestinal: Negative for abdominal distention, abdominal pain, blood in stool, constipation, diarrhea, nausea, rectal pain and vomiting. Endocrine: Negative for cold intolerance, polydipsia and polyuria. Genitourinary: Negative for decreased urine volume, difficulty urinating, dysuria, flank pain, hematuria and urgency. Musculoskeletal: Negative for arthralgias, back pain, gait problem, joint swelling, myalgias and neck stiffness. Skin: Negative for color change, rash and wound. Neurological: Negative for dizziness, tremors, seizures, speech difficulty, weakness, light-headedness, numbness and headaches. Hematological: Negative for adenopathy. Does not bruise/bleed easily. Psychiatric/Behavioral: Negative for confusion and sleep disturbance. The patient is not nervous/anxious. Objective:   Physical Exam   Constitutional: He is oriented to person, place, and time.  He appears well-developed and well-nourished. No distress. HENT:   Head: Normocephalic. Mouth/Throat: Oropharynx is clear and moist. No oropharyngeal exudate. Eyes: Pupils are equal, round, and reactive to light. EOM are normal. Right eye exhibits no discharge. Left eye exhibits no discharge. No scleral icterus. Neck: Normal range of motion. Neck supple. No JVD present. No tracheal deviation present. No thyromegaly present. Cardiovascular: Normal rate and normal heart sounds. Exam reveals no gallop and no friction rub. No murmur heard. Pulmonary/Chest: Effort normal and breath sounds normal. No stridor. No respiratory distress. He has no wheezes. He has no rales. He exhibits no tenderness. Abdominal: Soft. Bowel sounds are normal. He exhibits no distension and no mass. There is no tenderness. There is no rebound. Musculoskeletal: Normal range of motion. He exhibits no edema. Good range of motion in all four extremities. Lymphadenopathy:     He has no cervical adenopathy. Neurological: He is alert and oriented to person, place, and time. He has normal reflexes. No cranial nerve deficit. He exhibits normal muscle tone. Skin: Skin is warm. No rash noted. No erythema. Psychiatric: He has a normal mood and affect. His behavior is normal. Judgment and thought content normal.   Vitals reviewed. BP (!) 143/67 (Site: Left Upper Arm, Position: Sitting, Cuff Size: Medium Adult)   Pulse 72   Temp 99.1 °F (37.3 °C) (Oral)   Resp 16   Ht 5' 10\" (1.778 m)   Wt 208 lb (94.3 kg)   SpO2 96%   BMI 29.84 kg/m²      ECOG status is 0.     Imaging studies and labs:     Lab Results   Component Value Date    WBC 6.2 05/13/2019    HGB 13.2 (L) 05/13/2019    HCT 39.2 (L) 05/13/2019    MCV 89 05/13/2019     05/13/2019       Chemistry        Component Value Date/Time     05/13/2019 1306     09/17/2018 0806    K 3.9 05/13/2019 1306    K 3.9 10/17/2018 0620    K 3.3 (L) 06/22/2018 0507     09/17/2018 0806    CO2 25 09/17/2018 0806    BUN 14 09/17/2018 0806    CREATININE 1.1 05/13/2019 1306    CREATININE 1.0 09/17/2018 0806        Component Value Date/Time    CALCIUM 9.1 09/17/2018 0806    ALKPHOS 53 12/11/2018 0855    AST 22 12/11/2018 0855    ALT 16 12/11/2018 0855    BILITOT 0.4 12/11/2018 0855        CEA:  December 2018:1.3  May 2019:1.4    CT of the chest on December 20, 2018 showed:   Emphysematous changes of the lungs with no evidence of an acute intrathoracic process. CT of the abdomen on December 20, 2018 showed:  1. Postsurgical changes from previous partial colectomy. No evidence of recurrent or metastatic disease. 2. There is a small hiatal hernia. Assessment: 1. Low-grade appendiceal mucinous neoplasm, pTis(LAMN). Plan:   1. Appendiceal mucinous neoplasms are a heterogeneous group of tumors. Treatment is based on stage and histology. Low-grade tumors are treated surgically with resection of the primary site in early stage disease, no further treatment is required after surgery. Appendiceal mucinous neoplasms sometimes present with peritoneal dissemination, however the patient did not have evidence of peritoneal dissemination. Radically resected LAMN, even with limited peritoneal spread, carries a low recurrence risk. Today, the patient denies having any complaints/symptoms. Specifically no abdominal pain, no abdominal distention, no change in bowel movement pattern. His labs are within normal limits. His CEA is normal.  There is no evidence of disease recurrence on today's physical examination. I will see him again in 6 months. He will have the CT of the abdomen few weeks before RTC. Return in about 6 months (around 11/13/2019).      Orders Placed:   Orders Placed This Encounter   Procedures    CT ABDOMEN PELVIS W IV CONTRAST Additional Contrast? Oral     Standing Status:   Future     Standing Expiration Date:   5/13/2020     Order Specific Question:   Additional Contrast?     Answer:

## 2019-06-11 ENCOUNTER — HOSPITAL ENCOUNTER (OUTPATIENT)
Age: 79
Discharge: HOME | End: 2019-06-11
Payer: MEDICARE

## 2019-06-11 DIAGNOSIS — R97.20 ELEVATED PSA: ICD-10-CM

## 2019-06-11 PROCEDURE — 36415 COLL VENOUS BLD VENIPUNCTURE: CPT

## 2019-06-11 PROCEDURE — 84153 ASSAY OF PSA TOTAL: CPT

## 2019-06-12 LAB — PROSTATE SPECIFIC ANTIGEN: 4.42 NG/ML (ref 0–1)

## 2019-06-18 ENCOUNTER — OFFICE VISIT (OUTPATIENT)
Dept: UROLOGY | Age: 79
End: 2019-06-18
Payer: MEDICARE

## 2019-06-18 ENCOUNTER — TELEPHONE (OUTPATIENT)
Dept: UROLOGY | Age: 79
End: 2019-06-18

## 2019-06-18 VITALS
WEIGHT: 210 LBS | BODY MASS INDEX: 30.06 KG/M2 | DIASTOLIC BLOOD PRESSURE: 62 MMHG | SYSTOLIC BLOOD PRESSURE: 112 MMHG | HEIGHT: 70 IN

## 2019-06-18 DIAGNOSIS — R97.20 ELEVATED PSA: ICD-10-CM

## 2019-06-18 DIAGNOSIS — R33.9 URINE RETENTION: Primary | ICD-10-CM

## 2019-06-18 LAB
BILIRUBIN URINE: NEGATIVE
BLOOD URINE, POC: NEGATIVE
CHARACTER, URINE: CLEAR
COLOR, URINE: YELLOW
GLUCOSE URINE: NEGATIVE MG/DL
KETONES, URINE: NEGATIVE
LEUKOCYTE CLUMPS, URINE: NEGATIVE
NITRITE, URINE: NEGATIVE
PH, URINE: 6.5 (ref 5–9)
POST VOID RESIDUAL (PVR): 50 ML
PROTEIN, URINE: NEGATIVE MG/DL
SPECIFIC GRAVITY, URINE: 1.01 (ref 1–1.03)
UROBILINOGEN, URINE: 0.2 EU/DL (ref 0–1)

## 2019-06-18 PROCEDURE — 81003 URINALYSIS AUTO W/O SCOPE: CPT | Performed by: NURSE PRACTITIONER

## 2019-06-18 PROCEDURE — G8427 DOCREV CUR MEDS BY ELIG CLIN: HCPCS | Performed by: NURSE PRACTITIONER

## 2019-06-18 PROCEDURE — 1036F TOBACCO NON-USER: CPT | Performed by: NURSE PRACTITIONER

## 2019-06-18 PROCEDURE — 51798 US URINE CAPACITY MEASURE: CPT | Performed by: NURSE PRACTITIONER

## 2019-06-18 PROCEDURE — 99214 OFFICE O/P EST MOD 30 MIN: CPT | Performed by: NURSE PRACTITIONER

## 2019-06-18 PROCEDURE — G8417 CALC BMI ABV UP PARAM F/U: HCPCS | Performed by: NURSE PRACTITIONER

## 2019-06-18 PROCEDURE — 1123F ACP DISCUSS/DSCN MKR DOCD: CPT | Performed by: NURSE PRACTITIONER

## 2019-06-18 PROCEDURE — 4040F PNEUMOC VAC/ADMIN/RCVD: CPT | Performed by: NURSE PRACTITIONER

## 2019-06-18 RX ORDER — GENTAMICIN SULFATE 40 MG/ML
80 INJECTION, SOLUTION INTRAMUSCULAR; INTRAVENOUS ONCE
Qty: 2 ML | Refills: 0 | Status: SHIPPED | OUTPATIENT
Start: 2019-06-18 | End: 2019-06-18

## 2019-06-18 RX ORDER — CIPROFLOXACIN 500 MG/1
500 TABLET, FILM COATED ORAL 2 TIMES DAILY
Qty: 6 TABLET | Refills: 0 | Status: SHIPPED | OUTPATIENT
Start: 2019-06-18 | End: 2019-06-21

## 2019-06-18 RX ORDER — BLOOD-GLUCOSE METER
KIT MISCELLANEOUS
Refills: 3 | COMMUNITY
Start: 2019-05-28

## 2019-06-18 NOTE — PROGRESS NOTES
Diabetes mellitus ([facility] [address] History of kidney stones, Hyperlipidemia, and Hypertension. Past Surgical History  The patient  has a past surgical history that includes Lithotripsy; Retinal detachment surgery (Bilateral); Colonoscopy (04/2018); Inguinal hernia repair (Left, 2014); pr lap,appendectomy (N/A, 5/31/2018); pr lap,surg,colectomy, partial, w/anast (Right, 6/21/2018); Appendectomy; and pr office/outpt visit,procedure only (N/A, 10/17/2018). Family History  This patient's family history includes Breast Cancer in his sister; Dementia in his sister; Diabetes in his brother, mother, and sister; Heart Disease in his brother; Kidney Disease in his father; Stroke in his brother and mother. Social History  Caleb Casey  reports that he quit smoking about 26 years ago. He has a 38.00 pack-year smoking history. He has never used smokeless tobacco. He reports that he drinks alcohol. He reports that he does not use drugs. Review of Systems  No problems with ears, nose or throat. No problems with eyes. No chest pain, shortness of breath, abdominal pain, extremity pain or weakness, and no neurological deficits. No rashes. No swollen glands or lymph nodes.  symptoms per HPI. The remainder of the review of symptoms is negative. Exam  There were no vitals taken for this visit. Nursing note and vitals reviewed. Constitutional: Alert and oriented times 3, no acute distress and cooperative to examination with appropriate mood and affect. HENT:   Head:        Normocephalic and atraumatic. Mouth/Throat:         Mucous membranes are normal.   Eyes:         EOM are normal. No scleral icterus. PERRLA. Neck:        Supple, symmetrical, trachea midline, no adenopathy, thyroid symmetric, not enlarged and no tenderness. Cardiovascular:        Normal rate, regular rhythm, S1 S2 heart sounds. No murmurs, rub, or gallops.    Pulses:       Radial pulses are 2+/4 bilateral and equal. Posterior tibialis 2+/4 bilateral and equal  Pulmonary/Chest:      Chest symmetric with normal A/P diameter,  CTA with no wheezes, rales, or rhonchi noted. Normal respiratory rate and rhthym. No use of accessory muscles. Abdominal:         Soft. No tenderness. No rebound, no guarding and no CVA tenderness. Bowel sounds present. Musculoskeletal:         Normal range of motion. No edema or tenderness of lower extremities. Lymphadenopathy:        No cervical adenopathy. Bilateral supraclavicular adenopathy absent. Extremities: No cyanosis, clubbing, or edema present. Neurological:        Alert and oriented. No cranial nerve deficit. There are no focalizing motor or sensory deficits. CN II-XII are grossly intact. Psychiatric:        Normal mood and affect. Labs   Urine dip demonstrates   No results found for this visit on 06/18/19. Patients recent PSA values are as follows  Lab Results   Component Value Date    PSA 4.42 (H) 06/11/2019    PSA 4.34 (H) 03/14/2019    PSA 4.57 (H) 01/17/2019        Recent BUN/Creatinine:  Lab Results   Component Value Date    BUN 14 09/17/2018    CREATININE 1.1 05/13/2019    CREATININE 1.0 09/17/2018       Assessment & Plan  BPH with hx of urinary retention  Elevated PSA  Hx of kidney stone    Pt's PSA remains elevated at 4.42. Pt on chronic finasteride therapy so when corrected for finasteride PSA more like 8.8. Recommend further evaluation with TRUS prostate biopsy at this time. I described the procedure in detail and also described the associated risks and benefits at length. We discussed possible alternative therapies. We discussed the risks and benefits of not undergoing therapy. Patient understands these risks and benefits and desires to proceed. We will schedule Shawn Bennett for a prostate biopsy in the office by Dr. Nicolás Nelson in the next couple weeks. He was given a prescription for Cipro 500 mg to be taken BID the day before, day of, and day after the biopsy.  Gentamicin was sent to his pharmacy. He will bring this to the biopsy appointment for IM injection. He will do an enema the morning of the biopsy. He will stop all anti-coagulants a few days prior to the biopsy. The patient is aware to seek treatment if he would develop a fever or chills the night of the biopsy. He will return a few weeks after the biopsy for results and further recommendations.

## 2019-06-18 NOTE — PATIENT INSTRUCTIONS
lie on your back. · Your doctor may inject an anesthetic around the prostate to numb the area before the sample is taken. · Ultrasound is often used to guide the needle to the correct spot. · Your doctor may choose to use a needle guide for the biopsy. He or she will attach the guide to a finger. Your doctor will insert the finger into your rectum. · The needle will enter the prostate and take 6 to 12 samples. Through the urethra  · You will lie on your back. Your feet will rest in stirrups. · You will get anesthesia. The anesthesia may make you sleep. Or it may just numb the area being worked on.  · Your doctor will insert a lighted scope (cystoscope) into your urethra. The scope allows your doctor to look directly at the prostate. Your doctor will pass a cutting loop through the scope to remove samples of prostate tissue. Through the perineum  · You will lie on an exam table either on your side or on your back with your knees bent. You will get anesthesia. The anesthesia may make you sleep. Or it may just numb the area being worked on.  · Your doctor will make a small cut in your perineum. Then he or she will insert a finger into the rectum to hold the prostate. · Your doctor will then insert the needle through the cut and into the prostate. · The needle collects samples of tissue and is then pulled out. What else should you know about this test?  · A prostate biopsy has a slight risk of causing problems such as infection or bleeding. · If the biopsy went through your rectum, you may have a small amount of bleeding from your rectum for 2 to 3 days after the biopsy. · You may have a little pain in your pelvic area. You may also have a little blood in your urine for 1 to 5 days. · You may have some blood in your semen for a week or longer. How long will the test take? This test will take about 15 to 45 minutes.   What happens after the test?  Your doctor will tell you what to expect and watch for after your test. In general:  · If you have anesthesia that makes you sleep, you will be in a recovery room for a few hours. You will need someone to drive you home. You may feel tired for the rest of the day. · You will probably be able to go home right away. · If your doctor had you stop taking any medicine for the biopsy, ask him or her when you can start taking it again. If you were given antibiotics, take them as directed. · Do not do heavy work or exercise for 4 hours after the test.  · Your doctor will tell you how long it may take to get your results back. Follow-up care is a key part of your treatment and safety. Be sure to make and go to all appointments, and call your doctor if you are having problems. It's also a good idea to keep a list of the medicines you take. Ask your doctor when you can expect to have your test results. Where can you learn more? Go to https://OnApp.[employer]. org and sign in to your Fotoup account. Enter Y504 in the CompStak box to learn more about \"Prostate Biopsy: About This Test.\"     If you do not have an account, please click on the \"Sign Up Now\" link. Current as of: December 19, 2018  Content Version: 12.0  © 3099-9211 [employer]wise, Incorporated. Care instructions adapted under license by [facility] ([facility]). If you have questions about a medical condition or this instruction, always ask your healthcare professional. [place] [address] any warranty or liability for your use of this information.

## 2019-06-18 NOTE — TELEPHONE ENCOUNTER
PROSTATE ULTRASOUND/BIOPSY    Ping [facility]      1940    You are scheduled for the above procedure on:    07-   at:    8:00am with Dr. Reji Matias  Your follow up appointment for your biopsy results is on:    07-     At:   7:30am with Marielena Conley CNP    Please note that you will be responsible for any charges that are not paid by your insurance. The prostate biopsy specimens are sent to a Lab and their charges are billed to you separate from the office charges. DESCRIPTION OF PROSTATIC ULTRASOUND AND BIOPSY  Ultrasound uses harmless sound waves to give us pictures of the prostate, and allows us to accurately guide a biopsy needle to areas of concern. The procedure is done in the office. Initially, a complete prostate exam is done. Next the ultrasound probe, finger-like in size and shape, is placed into the rectum. With slight movement of the probe, many different views are obtained. A prostate block may or may not be given at this time. A spring loaded fine needle is place through the probe and directed into the prostate. Six or more biopsies are usually taken. These biopsies are not usually painful. The entire exam takes 20-30 minutes. POSSIBLE RISKS OF THE PROCEDURE  Blood may be noted in the stool and urine for a few days. Blood may be seen in the semen for up to a month. Infection of the prostate or in the urine can occur even with antibiotic preparation. You should call if you develop fever, chills, severe pain, or have continuous or significant bleeding. If you have known hemorrhoids, you may have more bleeding and discomfort in the rectal area following the biopsy. This may last for 3-5 days afterwards. If any concerns arise, please call the office.     PREPARATION  1.  DO NOT TAKE: ASPIRIN, MOTRIN, PERSANTINE, PLAVIX, IBUPROFEN, ARTHRITIS TYPE MEDICATION, COUMADIN, FISH OIL, VIT E, AND BLOOD THINNERS FOR 5 DAYS BEFORE THE BIOPSY AND 3 DAYS AFTER THE BIOPSY. YOU MAY TAKE TYLENOL IF NEEDED  2. Please eat a regular breakfast/lunch. 3.  Take your antibiotics as directed:  Cipro 500 mg twice a day, take one in the morning and one in the evening, start on:   07-    take until finished. 4.  Bring your Gentamicin 80 mg with you to your appointment. 5.  Do a Fleets Enema 2 hours before the visit. Purchase it at any drug store and follow the instructions on the package. 6. Please ensure to bring a  with you the day of the surgery to transport you home. HOME GOING AND FOLLOW UP INSTRUCTIONS  Call the doctor if: 1. There is a large amount of blood or clots in the urine or stool. 2.  You are unable to urinate. 3.  If your temperature is 101 degrees F or greater.            4.  You may resume your regular medication except those on  attached list.    DATE: 6/18/2019       SIGNATURE:________________________________

## 2019-07-15 ENCOUNTER — PROCEDURE VISIT (OUTPATIENT)
Dept: UROLOGY | Age: 79
End: 2019-07-15
Payer: MEDICARE

## 2019-07-15 VITALS
BODY MASS INDEX: 29.35 KG/M2 | WEIGHT: 205 LBS | HEIGHT: 70 IN | SYSTOLIC BLOOD PRESSURE: 134 MMHG | DIASTOLIC BLOOD PRESSURE: 76 MMHG

## 2019-07-15 DIAGNOSIS — R97.20 ELEVATED PSA: Primary | ICD-10-CM

## 2019-07-15 PROCEDURE — 55700 PR BIOPSY OF PROSTATE,NEEDLE/PUNCH: CPT | Performed by: UROLOGY

## 2019-07-15 PROCEDURE — 76872 US TRANSRECTAL: CPT | Performed by: UROLOGY

## 2019-07-15 PROCEDURE — 96372 THER/PROPH/DIAG INJ SC/IM: CPT | Performed by: UROLOGY

## 2019-07-15 RX ORDER — GENTAMICIN SULFATE 40 MG/ML
80 INJECTION, SOLUTION INTRAMUSCULAR; INTRAVENOUS ONCE
Status: COMPLETED | OUTPATIENT
Start: 2019-07-15 | End: 2019-07-15

## 2019-07-15 RX ADMIN — GENTAMICIN SULFATE 80 MG: 40 INJECTION, SOLUTION INTRAMUSCULAR; INTRAVENOUS at 08:19

## 2019-07-15 NOTE — PROGRESS NOTES
Procedure: Trus/Biopsy  Pt name and birth date verified Yes  Patient agrees Dr. Ken Valente is taking biopsies of the prostate Yes  Patient took Enema 2 hours prior to procedure Yes  Patient took 2 pill(s) of Cipro day before procedure, day of, and will the day after Yes  Has patient eaten today? Yes  Patient stopped all blood thinners prior to surgery Yes    Patient has given me verbal consent to perform Gentamicin Injection Yes    Following Dr. Marco Abebe of care.   Gentamicin 80MG/2ML GIVEN I.M Right [employer] HIP  Lot Number: [id number]  Expiration Date: 07/20  [facility] #: [id number]    Patient supplied their own medications Yes

## 2019-07-23 ENCOUNTER — OFFICE VISIT (OUTPATIENT)
Dept: UROLOGY | Age: 79
End: 2019-07-23
Payer: MEDICARE

## 2019-07-23 ENCOUNTER — TELEPHONE (OUTPATIENT)
Dept: UROLOGY | Age: 79
End: 2019-07-23

## 2019-07-23 VITALS
HEIGHT: 70 IN | DIASTOLIC BLOOD PRESSURE: 72 MMHG | SYSTOLIC BLOOD PRESSURE: 132 MMHG | BODY MASS INDEX: 29.63 KG/M2 | WEIGHT: 207 LBS

## 2019-07-23 DIAGNOSIS — C61 PROSTATE CANCER (HCC): Primary | ICD-10-CM

## 2019-07-23 DIAGNOSIS — C18.1 CANCER OF APPENDIX (HCC): ICD-10-CM

## 2019-07-23 PROCEDURE — 99214 OFFICE O/P EST MOD 30 MIN: CPT | Performed by: NURSE PRACTITIONER

## 2019-07-23 PROCEDURE — G8427 DOCREV CUR MEDS BY ELIG CLIN: HCPCS | Performed by: NURSE PRACTITIONER

## 2019-07-23 PROCEDURE — 4040F PNEUMOC VAC/ADMIN/RCVD: CPT | Performed by: NURSE PRACTITIONER

## 2019-07-23 PROCEDURE — G8417 CALC BMI ABV UP PARAM F/U: HCPCS | Performed by: NURSE PRACTITIONER

## 2019-07-23 PROCEDURE — 1036F TOBACCO NON-USER: CPT | Performed by: NURSE PRACTITIONER

## 2019-07-23 PROCEDURE — 1123F ACP DISCUSS/DSCN MKR DOCD: CPT | Performed by: NURSE PRACTITIONER

## 2019-07-23 NOTE — PROGRESS NOTES
(Right, 6/21/2018); Appendectomy; and pr office/outpt visit,procedure only (N/A, 10/17/2018). Family History  This patient's family history includes Breast Cancer in his sister; Dementia in his sister; Diabetes in his brother, mother, and sister; Heart Disease in his brother; Kidney Disease in his father; Stroke in his brother and mother. Social History  Jarrod Joaquin  reports that he quit smoking about 26 years ago. He has a 38.00 pack-year smoking history. He has never used smokeless tobacco. He reports that he drinks alcohol. He reports that he does not use drugs. Review of Systems  No problems with ears, nose or throat. No problems with eyes. No chest pain, shortness of breath, abdominal pain, extremity pain or weakness, and no neurological deficits. No rashes. No swollen glands or lymph nodes.  symptoms per HPI. The remainder of the review of symptoms is negative. Exam  There were no vitals taken for this visit. Nursing note and vitals reviewed. Constitutional: Alert and oriented times 3, no acute distress and cooperative to examination with appropriate mood and affect. HENT:   Head:        Normocephalic and atraumatic. Mouth/Throat:         Mucous membranes are normal.   Eyes:         EOM are normal. No scleral icterus. PERRLA. Neck:        Supple, symmetrical, trachea midline, no adenopathy, thyroid symmetric, not enlarged and no tenderness. Cardiovascular:        Normal rate, regular rhythm, S1 S2 heart sounds. No murmurs, rub, or gallops. Pulses:       Radial pulses are 2+/4 bilateral and equal. Posterior tibialis 2+/4 bilateral and equal  Pulmonary/Chest:      Chest symmetric with normal A/P diameter,  CTA with no wheezes, rales, or rhonchi noted. Normal respiratory rate and rhthym. No use of accessory muscles. Abdominal:         Soft. No tenderness. No rebound, no guarding and no CVA tenderness. Bowel sounds present. Musculoskeletal:         Normal range of motion.   No edema appropriate questions. We discussed decipher testing and pt would like to proceed. Office to facilitate. We will check CT of the abd/pelvis     Pt and wife report he may want to consider radiation oncology referral pending results of decipher and CT abd/pelvis. Send tissue for decipher and CT abd/pelvis. Appt in ~3 weeks with Dr. Beatriz Wylie to review results and further discuss treatment plan.

## 2019-07-25 ENCOUNTER — HOSPITAL ENCOUNTER (OUTPATIENT)
Age: 79
Discharge: HOME | End: 2019-07-25
Payer: MEDICARE

## 2019-07-25 DIAGNOSIS — C18.1 CANCER OF APPENDIX (HCC): ICD-10-CM

## 2019-07-25 DIAGNOSIS — C61 PROSTATE CANCER (HCC): ICD-10-CM

## 2019-07-25 LAB
CREAT SERPL-MCNC: 1.1 MG/DL (ref 0.4–1.2)
GFR SERPL CREATININE-BSD FRML MDRD: 64 ML/MIN/1.73M2

## 2019-07-25 PROCEDURE — 82565 ASSAY OF CREATININE: CPT

## 2019-07-25 PROCEDURE — 36415 COLL VENOUS BLD VENIPUNCTURE: CPT

## 2019-08-05 ENCOUNTER — HOSPITAL ENCOUNTER (OUTPATIENT)
Dept: CT IMAGING | Age: 79
Discharge: HOME | End: 2019-08-05
Payer: MEDICARE

## 2019-08-05 DIAGNOSIS — C61 PROSTATE CANCER (HCC): ICD-10-CM

## 2019-08-05 DIAGNOSIS — C18.1 CANCER OF APPENDIX (HCC): ICD-10-CM

## 2019-08-05 PROCEDURE — 6360000004 HC RX CONTRAST MEDICATION: Performed by: NURSE PRACTITIONER

## 2019-08-05 PROCEDURE — 74177 CT ABD & PELVIS W/CONTRAST: CPT

## 2019-08-05 RX ADMIN — IOHEXOL 50 ML: 240 INJECTION, SOLUTION INTRATHECAL; INTRAVASCULAR; INTRAVENOUS; ORAL at 09:50

## 2019-08-05 RX ADMIN — IOPAMIDOL 100 ML: 755 INJECTION, SOLUTION INTRAVENOUS at 10:58

## 2019-08-12 ENCOUNTER — OFFICE VISIT (OUTPATIENT)
Dept: UROLOGY | Age: 79
End: 2019-08-12
Payer: MEDICARE

## 2019-08-12 ENCOUNTER — TELEPHONE (OUTPATIENT)
Dept: UROLOGY | Age: 79
End: 2019-08-12

## 2019-08-12 VITALS
WEIGHT: 205 LBS | BODY MASS INDEX: 29.35 KG/M2 | HEIGHT: 70 IN | DIASTOLIC BLOOD PRESSURE: 78 MMHG | SYSTOLIC BLOOD PRESSURE: 130 MMHG

## 2019-08-12 DIAGNOSIS — C61 PROSTATE CANCER (HCC): Primary | ICD-10-CM

## 2019-08-12 PROCEDURE — 4040F PNEUMOC VAC/ADMIN/RCVD: CPT | Performed by: UROLOGY

## 2019-08-12 PROCEDURE — 1123F ACP DISCUSS/DSCN MKR DOCD: CPT | Performed by: UROLOGY

## 2019-08-12 PROCEDURE — G8428 CUR MEDS NOT DOCUMENT: HCPCS | Performed by: UROLOGY

## 2019-08-12 PROCEDURE — G8417 CALC BMI ABV UP PARAM F/U: HCPCS | Performed by: UROLOGY

## 2019-08-12 PROCEDURE — 1036F TOBACCO NON-USER: CPT | Performed by: UROLOGY

## 2019-08-12 PROCEDURE — 99205 OFFICE O/P NEW HI 60 MIN: CPT | Performed by: UROLOGY

## 2019-08-12 ASSESSMENT — ENCOUNTER SYMPTOMS
DIARRHEA: 0
EYES NEGATIVE: 1
CHEST TIGHTNESS: 0
SHORTNESS OF BREATH: 0
RESPIRATORY NEGATIVE: 1
EYE PAIN: 0
EYE REDNESS: 0
GASTROINTESTINAL NEGATIVE: 1
VOMITING: 0
CONSTIPATION: 0

## 2019-08-12 NOTE — PROGRESS NOTES
Subjective:      Patient ID: Pranay Ortiz [de-identified] y.o. male 1940    Chief Complaint   Patient presents with    Results     CT and Decipher        Other   This is a new (prostate cancer) problem. The current episode started more than 1 month ago. The problem occurs constantly. The problem has been unchanged. Pertinent negatives include no chest pain, chills, congestion, fever, headaches, rash or vomiting. Nothing aggravates the symptoms. He has tried nothing for the symptoms. The treatment provided no relief.        Past Medical History:   Diagnosis Date    Diabetes mellitus (HCC)     diet and exercise controlled    History of kidney stones     Hyperlipidemia     Hypertension        Social History     Socioeconomic History    Marital status:      Spouse name: Not on file    Number of children: Not on file    Years of education: Not on file    Highest education level: Not on file   Occupational History    Not on file   Social Needs    Financial resource strain: Not on file    Food insecurity:     Worry: Not on file     Inability: Not on file    Transportation needs:     Medical: Not on file     Non-medical: Not on file   Tobacco Use    Smoking status: Former Smoker     Packs/day: 1.00     Years: 38.00     Pack years: 38.00     Last attempt to quit:      Years since quittin.6    Smokeless tobacco: Never Used   Substance and Sexual Activity    Alcohol use: Yes     Comment: socially beer on weekends    Drug use: No    Sexual activity: Not on file   Lifestyle    Physical activity:     Days per week: Not on file     Minutes per session: Not on file    Stress: Not on file   Relationships    Social connections:     Talks on phone: Not on file     Gets together: Not on file     Attends Voodoo service: Not on file     Active member of club or organization: Not on file     Attends meetings of clubs or organizations: Not on file     Relationship status: Not on file    Intimate Rfl:     simvastatin (ZOCOR) 40 MG tablet, Take 20 mg by mouth nightly Takes 1/2 tablet at night, Disp: , Rfl:     Review of Systems   Constitutional: Negative. Negative for chills and fever. HENT: Negative. Negative for congestion and dental problem. Eyes: Negative. Negative for pain and redness. Respiratory: Negative. Negative for chest tightness and shortness of breath. Cardiovascular: Negative. Negative for chest pain. Gastrointestinal: Negative. Negative for constipation, diarrhea and vomiting. Genitourinary: Negative for difficulty urinating, dysuria, frequency and hematuria. Musculoskeletal: Negative. Skin: Negative. Negative for rash and wound. Neurological: Negative. Negative for dizziness and headaches. Psychiatric/Behavioral: Negative for agitation and confusion. /78   Ht 5' 10\" (1.778 m)   Wt 205 lb (93 kg)   BMI 29.41 kg/m²     Objective:   Physical Exam   Constitutional: He is oriented to person, place, and time. Vital signs are normal. He appears well-developed and well-nourished. He is cooperative. No distress. HENT:   Head: Normocephalic and atraumatic. Mouth/Throat: Oropharynx is clear and moist and mucous membranes are normal. No oropharyngeal exudate. Eyes: Pupils are equal, round, and reactive to light. EOM are normal. Right eye exhibits no discharge. Left eye exhibits no discharge. No scleral icterus. Neck: Trachea normal. No JVD present. No tracheal deviation present. Pulmonary/Chest: Effort normal. No respiratory distress. He has no wheezes. Abdominal: Soft. He exhibits no distension. There is no tenderness. There is no rebound and no CVA tenderness. Musculoskeletal: He exhibits no edema or tenderness. Lymphadenopathy:        Right: No supraclavicular adenopathy present. Left: No supraclavicular adenopathy present. Neurological: He is alert and oriented to person, place, and time. No cranial nerve deficit.    Skin: Skin is

## 2019-08-12 NOTE — TELEPHONE ENCOUNTER
Decipher form was not filled out. Patient just had decipher done on 7/23/19, results were scanned into patients chart on 8/5/19.

## 2019-08-19 ENCOUNTER — TELEPHONE (OUTPATIENT)
Dept: UROLOGY | Age: 79
End: 2019-08-19

## 2019-08-19 ENCOUNTER — OFFICE VISIT (OUTPATIENT)
Dept: UROLOGY | Age: 79
End: 2019-08-19
Payer: MEDICARE

## 2019-08-19 VITALS
WEIGHT: 205 LBS | HEIGHT: 70 IN | SYSTOLIC BLOOD PRESSURE: 138 MMHG | BODY MASS INDEX: 29.35 KG/M2 | DIASTOLIC BLOOD PRESSURE: 84 MMHG

## 2019-08-19 DIAGNOSIS — C61 PROSTATE CANCER (HCC): Primary | ICD-10-CM

## 2019-08-19 DIAGNOSIS — Z01.818 PRE-OP TESTING: ICD-10-CM

## 2019-08-19 PROCEDURE — G8428 CUR MEDS NOT DOCUMENT: HCPCS | Performed by: UROLOGY

## 2019-08-19 PROCEDURE — 1036F TOBACCO NON-USER: CPT | Performed by: UROLOGY

## 2019-08-19 PROCEDURE — 99214 OFFICE O/P EST MOD 30 MIN: CPT | Performed by: UROLOGY

## 2019-08-19 PROCEDURE — G8417 CALC BMI ABV UP PARAM F/U: HCPCS | Performed by: UROLOGY

## 2019-08-19 PROCEDURE — 1123F ACP DISCUSS/DSCN MKR DOCD: CPT | Performed by: UROLOGY

## 2019-08-19 PROCEDURE — 4040F PNEUMOC VAC/ADMIN/RCVD: CPT | Performed by: UROLOGY

## 2019-08-19 ASSESSMENT — ENCOUNTER SYMPTOMS
APNEA: 0
ANAL BLEEDING: 0
COLOR CHANGE: 0
EYE PAIN: 0
ABDOMINAL DISTENTION: 0
EYE REDNESS: 0

## 2019-08-19 NOTE — TELEPHONE ENCOUNTER
MEDICAL CLEARANCE FORM  Clearance From : Dr. Aishwarya Conley        Appointment Date  8/21/19      Time   1:20 pm    Yari Johnson  1940  Surgeon:  Dr Marina López    Procedure:  Robot assisted laparoscopic radical prostatectomy  Date:  Pending Clearance  Facility: [address] [place] [address] HISTORY  DM CAD PVD CVA DVT/PE MI CHFMalignant Hyperthemia HTN Tobacco/ETOH Sleep Apnea GERD Hyperlipidemia Renal Insufficiency COPD/Asthma Bleeding Disorder Pacemaker/AICD  II. CURRENT MEDICATIONS: Attach list or complete     Pt is on following meds that need special instructions for surgery:  Anticoagulants Heart Meds ASA Insulin Oral anti-diabetics NSAIDS Diuretics     K replacements  III. ALLERGIES:   IV.  FUNCTIONAL CAPACITY  >4 METS (CAN VACUUM/HOUSEWORK,CLIMB FLIGHT STAIRS WITHOUT DYSPNEA)  <4METS (FLIGHT OF STEPS CAUSES DYSPNEA/CARDIAC SYMPTOMS)   Stress Test Recommended:    Stress tests or Cardiac Cath in last 5 years:  Yes (attach report)  No   Results: WNL   ABN  Any Change in Cardiac symptoms: Yes  NO  Comments:    Revascularization in last 5 years: Yes  NO  CABG: Yes  No   Comments:     Stents:  Date: ________  Any change in cardiac symptoms  Yes  NO  Comments:   V.  REVIEW OF SYSTEMS:  (Pertinent positive or negative)    VI. PHYSICIAL EXAM  HEENT:1.  Dentations   Good Poor          HT:_______ WT:______      2. Neck Pathology: Rheumatoid DDD C-spine  BP:______ P:________  PULMONARY:  CARDIAC  ABDOMEN  EXTREMITIES  OTHER  VII.   Testing Ordered by Surgeon  Reviewed by Clearance Physician  Test      Result  Plan,if Abnormal  ___CBS     WNL  ABN____________________  ___BMP/BUN/CR    WNL  ABN____________________  ___K+      WNL  ABN____________________  ___UA      WNL  ABN____________________  ___CXR     WNL  ABN____________________  ___EKG     WNL  ABN____________________  ___MRSA     WNL  ABN____________________  VIII.  ___Acceptable risk for surgery ___Risk Unacceptable-Communication to Follow

## 2019-08-19 NOTE — TELEPHONE ENCOUNTER
Patient here to schedule surgery. Patient will need clearance from Dr Roverto Sheehan. Appointment for 8/21/19 at 1:20 pm. Patient give pre op testing to do prior to appointment. Patient is doing them at [facility]. Surgery consent signed. Surgery date pending clearance. Referral to [facility] made all info faxed.

## 2019-08-19 NOTE — PROGRESS NOTES
mellitus ([facility] [address]     diet and exercise controlled    History of kidney stones     Hyperlipidemia     Hypertension        Social History     Socioeconomic History    Marital status:      Spouse name: Not on file    Number of children: Not on file    Years of education: Not on file    Highest education level: Not on file   Occupational History    Not on file   Social Needs    Financial resource strain: Not on file    Food insecurity:     Worry: Not on file     Inability: Not on file    Transportation needs:     Medical: Not on file     Non-medical: Not on file   Tobacco Use    Smoking status: Former Smoker     Packs/day: 1.00     Years: 38.00     Pack years: 38.00     Last attempt to quit:      Years since quittin.6    Smokeless tobacco: Never Used   Substance and Sexual Activity    Alcohol use: Yes     Comment: socially beer on weekends    Drug use: No    Sexual activity: Not on file   Lifestyle    Physical activity:     Days per week: Not on file     Minutes per session: Not on file    Stress: Not on file   Relationships    Social connections:     Talks on phone: Not on file     Gets together: Not on file     Attends Tenriism service: Not on file     Active member of club or organization: Not on file     Attends meetings of clubs or organizations: Not on file     Relationship status: Not on file    Intimate partner violence:     Fear of current or ex partner: Not on file     Emotionally abused: Not on file     Physically abused: Not on file     Forced sexual activity: Not on file   Other Topics Concern    Not on file   Social History Narrative    Not on file       Family History   Problem Relation Age of Onset    Diabetes Mother     Stroke Mother     Kidney Disease Father     Diabetes Sister     Breast Cancer Sister     Dementia Sister     Heart Disease Brother     Diabetes Brother     Stroke Brother        Past Surgical History:   Procedure Laterality Date   

## 2019-08-20 LAB
BASOPHILS ABSOLUTE: ABNORMAL /ΜL
BASOPHILS RELATIVE PERCENT: 1.1 %
BUN BLDV-MCNC: 15 MG/DL
CALCIUM SERPL-MCNC: 9.2 MG/DL
CHLORIDE BLD-SCNC: 102 MMOL/L
CO2: 27 MMOL/L
CREAT SERPL-MCNC: 1.1 MG/DL
EOSINOPHILS ABSOLUTE: ABNORMAL /ΜL
EOSINOPHILS RELATIVE PERCENT: 4 %
GFR CALCULATED: NORMAL
GLUCOSE BLD-MCNC: 111 MG/DL
HCT VFR BLD CALC: 39.7 % (ref 41–53)
HEMOGLOBIN: 13.6 G/DL (ref 13.5–17.5)
LYMPHOCYTES ABSOLUTE: ABNORMAL /ΜL
LYMPHOCYTES RELATIVE PERCENT: 26.5 %
MCH RBC QN AUTO: 31.3 PG
MCHC RBC AUTO-ENTMCNC: 34.3 G/DL
MCV RBC AUTO: 91.4 FL
MONOCYTES ABSOLUTE: ABNORMAL /ΜL
MONOCYTES RELATIVE PERCENT: 12.6 %
NEUTROPHILS ABSOLUTE: ABNORMAL /ΜL
NEUTROPHILS RELATIVE PERCENT: 55.8 %
PDW BLD-RTO: 13.9 %
PLATELET # BLD: 248 K/ΜL
PMV BLD AUTO: ABNORMAL FL
POTASSIUM SERPL-SCNC: 4.4 MMOL/L
RBC # BLD: 4.34 10^6/ΜL
SODIUM BLD-SCNC: 137 MMOL/L
WBC # BLD: 7.2 10^3/ML

## 2019-08-22 DIAGNOSIS — Z01.818 PRE-OP TESTING: ICD-10-CM

## 2019-08-22 DIAGNOSIS — C61 PROSTATE CANCER (HCC): ICD-10-CM

## 2019-08-29 ENCOUNTER — PREP FOR PROCEDURE (OUTPATIENT)
Dept: UROLOGY | Age: 79
End: 2019-08-29

## 2019-08-29 ENCOUNTER — HOSPITAL ENCOUNTER (OUTPATIENT)
Age: 79
Discharge: HOME | End: 2019-08-29
Payer: MEDICARE

## 2019-08-29 ENCOUNTER — TELEPHONE (OUTPATIENT)
Dept: UROLOGY | Age: 79
End: 2019-08-29

## 2019-08-29 DIAGNOSIS — C61 PROSTATE CANCER (HCC): ICD-10-CM

## 2019-08-29 DIAGNOSIS — Z01.818 PRE-OP TESTING: ICD-10-CM

## 2019-08-29 LAB
AMORPHOUS: ABNORMAL
BACTERIA: ABNORMAL
BILIRUBIN URINE: NEGATIVE
BLOOD, URINE: ABNORMAL
CASTS UA: ABNORMAL /LPF
CHARACTER, URINE: CLEAR
COLOR: YELLOW
CRYSTALS, UA: ABNORMAL
EPITHELIAL CELLS, UA: ABNORMAL /HPF
GLUCOSE, URINE: NEGATIVE MG/DL
KETONES, URINE: NEGATIVE
LEUKOCYTE ESTERASE, URINE: NEGATIVE
MUCUS: ABNORMAL
NITRITE, URINE: NEGATIVE
PH UA: 7 (ref 5–9)
PROTEIN UA: NEGATIVE MG/DL
RBC UA: ABNORMAL /HPF
REFLEX TO URINE C & S: ABNORMAL
SPECIFIC GRAVITY UA: 1.01 (ref 1–1.03)
UROBILINOGEN, URINE: 0.2 EU/DL (ref 0–1)
WBC UA: ABNORMAL /HPF

## 2019-08-29 PROCEDURE — 81001 URINALYSIS AUTO W/SCOPE: CPT

## 2019-08-29 RX ORDER — SODIUM CHLORIDE 9 MG/ML
INJECTION, SOLUTION INTRAVENOUS CONTINUOUS
Status: CANCELLED | OUTPATIENT
Start: 2019-09-04

## 2019-08-29 NOTE — TELEPHONE ENCOUNTER
DO NOT TAKE ASPIRIN, PLAVIX, FISH OIL, GLUCOSAMINE CHONDROITIN, MULTIVITAMINS, VITAMIN A, VITAMIN E, VITAMIN B, COUMADIN, OR MOTRIN-LIKE DRUGS 7 DAYS PRIOR TO SURGERY AND 3 DAYS FOLLOWING  IF YOU TAKE CBD OIL DO NOT TAKE 3-DAYS PRIOR TO SURGERY     Lorraine Grullon 1940 Diagnosis: Prostate Ca    Surgical Physician: Dr. Bryanna Owens have been scheduled for the procedure marked below:      Surgery: Robot assisted laparoscopic radical prostatectomy           Date: 9/4/19     Anesthesia: Anesthesiologist (General/Spinal)     Place of Service: [facility]         Second Floor Same Day Surgery            SURGERY ARRIVAL TIME WILL BE DETERMINED BY DR. Thee Briseno AT A LATER DATE. YOU WILL BE NOTIFIED AT LEAST 2-DAYS PRIOR TO YOUR SURGERY WITH THIS INFORMATION. INSTRUCTIONS AS MARKED BELOW:    1.  DO NOT eat or drink anything after midnight before surgery. 2.  We prefer you shower or bathe with an antibacterial soap (Dial) the morning of surgery. 3.  Plan to spend the overnight at the hospital the day of surgery. 4.  Please bring a current medication list, photo ID and insurance card(s) with you  5. Okay to take Tylenol  6. If you take Glucophage or Metformin, hold 48-hours prior to surgery  7. Take blood pressure or heart medication as directed, if taken in the morning take with a small sip of water  8. PLEASE BRING THIS LETTER WITH YOU AND SHOW IT TO THE  AT [place] [address]. 9.  Xand Player PAPopJax may assist with your surgery  10. Does patient have a Pace Maker? No  11. Please send a copy to the Family Dr: Wilson Sousa   12. Make sure the uranalysis is done as soon as possible. Order given  15. Follow the bowel prep the day before surgery. Instructions included.   15. Your surgery follow up appointment is scheduled for   9/10/19   At   9:15 am   With  [facility]    Date: 8/29/2019

## 2019-08-29 NOTE — TELEPHONE ENCOUNTER
Robotic Surgery Scheduling Form   [facility] [address]    Phone * [phone]   Surgical Scheduling Direct line Phone * [phone]  Fax * [phone]      Delicia Rivera      1940    male    [address] [address]  Marital Status:     Home Phone: [phone]   Cell Phone:   Telephone Information:   Mobile [phone]              Surgeon: Dr Lupe Ortega     Surgery Date:9/4/19    Time:      Procedure: Robot assisted laparoscopic radical prostatectomy     Outpatient/OBS     Diagnosis: Prostate Ca    Important Medical History: In Epic    Special Inst/Equip: ROBOT    CPT Codes: 02252      Latex Allergy: No Cardiac Device:  No    Case Location:  Main OR     Preadmission Testing: Phone Call  Visit    PAT Date and Time: ________________________________    PAT Confirmation #: _________________________________    Post Op Visit:  ______________________________________    Need Preop Cardiac Clearance:   No    Does patient have Cardiologist/physician?    Dr. Boom Arroyo #:  ______________________________________________    Brand Ast: __________________________________ Date:____________________    Firefly:  No    Dual Console:  No   Ultrasound:  No    Single Site: No    RNFA (colon resection only): no Assisting Surgeon: Clayton Elliott PA-C    Insurance Company Name:  [facility]

## 2019-09-02 NOTE — H&P
History and Physical performed by Dr. Nadia See [de-identified] y.o. male 1940          Chief Complaint   Patient presents with    Results       decipher          Other   This is a new (prostate cancer) problem. The current episode started more than 1 month ago. The problem occurs constantly. The problem has been unchanged. Pertinent negatives include no chills, congestion or fever. Nothing aggravates the symptoms. Treatments tried: decipher testing demonstrating high risk disease. The treatment provided mild relief.      Mr. Grullon was seen in follow up to discuss his biopsy results. Unfortunately we found prostate cancer in the biopsy cores as follows:     A, D-F.  Prostate, right apex, left apex, left mid, and left base,  biopsies:   Benign prostatic tissue with chronic inflammation and atrophy. B.  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      Percentage of pattern 4 is approximately 10%. C.  Prostate, right base, needle biopsy:      Prostatic adenocarcinoma, Abel score 3+4 = 7, grade group 2, in      1 of 2 cores, 4/18 mm, 22%.      Percentage of pattern 4 is approximately 10%.    His most recent PSA was 4.42  Prostate size measured 31.02 cc at ultrasound for prostate biopsy.     Today we discussed prostate cancer in general and Rory's prostate cancer in specific. We discussed possible treatment options ranging from active surveillance, to radiation, to surgery. We also discussed alternative therapies such as cryotherapy and HIFU. I have given him a book on prostate cancer and wellness to continue this review. We discussed his options at length and also their associated risks and benefits. He appears to understand and asked appropriate questions.   Today more than 50% of Rory's visit was spent in these discussions and the total time of his visit was 30 minutes.        Past Medical History        Past Medical History:   Diagnosis Date    Diabetes mellitus ([facility] [address]       diet and exercise controlled    History of kidney stones     Hyperlipidemia      Hypertension              Social History               Socioeconomic History    Marital status:        Spouse name: Not on file    Number of children: Not on file    Years of education: Not on file    Highest education level: Not on file   Occupational History    Not on file   Social Needs    Financial resource strain: Not on file    Food insecurity:       Worry: Not on file       Inability: Not on file    Transportation needs:       Medical: Not on file       Non-medical: Not on file   Tobacco Use    Smoking status: Former Smoker       Packs/day: 1.00       Years: 38.00       Pack years: 38.00       Last attempt to quit:        Years since quittin.6    Smokeless tobacco: Never Used   Substance and Sexual Activity    Alcohol use:  Yes       Comment: socially beer on weekends    Drug use: No    Sexual activity: Not on file   Lifestyle    Physical activity:       Days per week: Not on file       Minutes per session: Not on file    Stress: Not on file   Relationships    Social connections:       Talks on phone: Not on file       Gets together: Not on file       Attends Alevism service: Not on file       Active member of club or organization: Not on file       Attends meetings of clubs or organizations: Not on file       Relationship status: Not on file    Intimate partner violence:       Fear of current or ex partner: Not on file       Emotionally abused: Not on file       Physically abused: Not on file       Forced sexual activity: Not on file   Other Topics Concern    Not on file   Social History Narrative    Not on file            Family History         Family History   Problem Relation Age of Onset    Diabetes Mother      Stroke Mother      Kidney Disease Father      Diabetes Sister      Breast Cancer Sister      Dementia Sister      Heart Disease Brother      Diabetes Brother      Stroke Brother              Past Surgical History         Past Surgical History:   Procedure Laterality Date    APPENDECTOMY        COLONOSCOPY   04/2018     Dr. Bandar Jon Left 2014     [place], [place]    LITHOTRIPSY        [facility] LAP,APPENDECTOMY N/A 5/31/2018     APPENDECTOMY LAPAROSCOPIC performed by Nona Meadows MD at [facility], PARTIAL, W/ANAST Right 6/21/2018     ROBOTIC RIGHT COLECTOMY performed by Nona Meadows MD at [address] [facility] OFFICE/OUTPT [address] [facility] N/A 10/17/2018     CYSTO, LEFT URETEROSCOPY, LASER LITHOTRIPSY, BASKET RETRIEVAL OF STONES, STENT PLACEMENT performed by Ricka Primrose, MD at [facility] Bilateral                    Allergies   Allergen Reactions    Lisinopril Other (See Comments)       coughing  Other reaction(s): Cough           Current Medication      Current Outpatient Medications:     FREESTYLE LITE strip, USE 1 STRIP TO CHECK GLUCOSE ONCE DAILY AS DIRECTED, Disp: , Rfl: 3    Cholecalciferol (VITAMIN D3) 2000 units CAPS, Take 1 capsule by mouth daily, Disp: , Rfl:     MULTIPLE VITAMIN PO, Take by mouth Takes 1/2 tablet every morning, Disp: , Rfl:     finasteride (PROSCAR) 5 MG tablet, Take 5 mg by mouth daily, Disp: , Rfl:     tamsulosin (FLOMAX) 0.4 MG capsule, Take 0.4 mg by mouth daily, Disp: , Rfl:     hydrochlorothiazide (HYDRODIURIL) 25 MG tablet, Take 12.5 mg by mouth daily , Disp: , Rfl:     losartan (COZAAR) 50 MG tablet, Take 25 mg by mouth every morning , Disp: , Rfl:     simvastatin (ZOCOR) 40 MG tablet, Take 20 mg by mouth nightly Takes 1/2 tablet at night, Disp: , Rfl:         Review of Systems   Constitutional: Negative for chills and fever. HENT: Negative for congestion and drooling. Eyes: Negative for pain and redness.    Respiratory: 09/17/2018               Lab Results   Component Value Date     PSA 4.42 (H) 06/11/2019     PSA 4.34 (H) 03/14/2019     PSA 4.57 (H) 01/17/2019         Decipher testing:       Assessment:     Diagnosis Orders   1. Prostate cancer [facility])            Mr. Lucho Gomez presents today in follow-up for Prostate cancer ([facility] [address] Radha Tate.     He has had Decipher testing and this demonstrated high risk disease. Even though he has a small volume cancer, this is something that should be treated. He agrees with this.     All treatment options, including active surveillance, radiation therapy and surgery and other treatment modalities such as cryotherapy, were discussed.     Mr. Lucho Gomez is interested in pursuing surgical removal of his prostate and would like to have this done laparoscopically with robot-assistance.      We have discussed the procedure in detail along with what to expect with the surgery as well as recovery. We discussed the most common side effects of surgical removal of the prostate gland, including incontinence and impotence. We also discussed those things we would be doing pre-operatively, intra-operatively and post-operatively in order to minimize these risks and to treat the conditions as they occur.     We also discussed the following hospital course. We discussed the need to have a moffett catheter for approximately a week after the procedure. We discussed our follow up schedule for checking PSA at 8 weeks, 5 months, 8 months and then again at the 1 year john. Additional testing will be based on final pathology and his PSA over the first year.     We discussed the possible need for additional therapy such as radiation or hormone therapy.     Mr. Grullon understands our discussions and asked appropriate questions which were all answered. He desires to proceed with surgery and we will schedule this in the very near future.         Plan:  I will schedule Mr. Grullon for Robot-Assisted Laparoscopic prostatectomy in the near future. Pre-operatively he will also be seeing John Garcia, the physical therapist I refer to for pelvic floor therapy. We will make sure we have appropriate medical and cardiac clearance prior to his surgery date.     We will see Mr. Grullon in the office a week after the procedure to discuss his pathology and to remove his staples and moffett catheter.

## 2019-09-04 ENCOUNTER — ANESTHESIA EVENT (OUTPATIENT)
Dept: OPERATING ROOM | Age: 79
End: 2019-09-04
Payer: MEDICARE

## 2019-09-04 ENCOUNTER — ANESTHESIA (OUTPATIENT)
Dept: OPERATING ROOM | Age: 79
End: 2019-09-04
Payer: MEDICARE

## 2019-09-04 ENCOUNTER — HOSPITAL ENCOUNTER (OUTPATIENT)
Age: 79
Discharge: HOME | End: 2019-09-06
Attending: UROLOGY | Admitting: UROLOGY
Payer: MEDICARE

## 2019-09-04 VITALS
SYSTOLIC BLOOD PRESSURE: 157 MMHG | DIASTOLIC BLOOD PRESSURE: 72 MMHG | TEMPERATURE: 96.1 F | RESPIRATION RATE: 10 BRPM | OXYGEN SATURATION: 99 %

## 2019-09-04 DIAGNOSIS — G89.18 POSTOPERATIVE PAIN: ICD-10-CM

## 2019-09-04 DIAGNOSIS — C61 PROSTATE CANCER (HCC): Primary | ICD-10-CM

## 2019-09-04 LAB
ABO: NORMAL
ANTIBODY SCREEN: NORMAL
GLUCOSE BLD-MCNC: 105 MG/DL (ref 70–108)
RH FACTOR: NORMAL

## 2019-09-04 PROCEDURE — 55866 LAPS SURG PRST8ECT RPBIC RAD: CPT | Performed by: UROLOGY

## 2019-09-04 PROCEDURE — 2500000003 HC RX 250 WO HCPCS

## 2019-09-04 PROCEDURE — 82948 REAGENT STRIP/BLOOD GLUCOSE: CPT

## 2019-09-04 PROCEDURE — 2500000003 HC RX 250 WO HCPCS: Performed by: NURSE ANESTHETIST, CERTIFIED REGISTERED

## 2019-09-04 PROCEDURE — 3700000000 HC ANESTHESIA ATTENDED CARE: Performed by: UROLOGY

## 2019-09-04 PROCEDURE — 86900 BLOOD TYPING SEROLOGIC ABO: CPT

## 2019-09-04 PROCEDURE — 3600000009 HC SURGERY ROBOT BASE: Performed by: UROLOGY

## 2019-09-04 PROCEDURE — 6360000002 HC RX W HCPCS: Performed by: PHYSICIAN ASSISTANT

## 2019-09-04 PROCEDURE — 2780000010 HC IMPLANT OTHER: Performed by: UROLOGY

## 2019-09-04 PROCEDURE — 2720000010 HC SURG SUPPLY STERILE: Performed by: UROLOGY

## 2019-09-04 PROCEDURE — 88304 TISSUE EXAM BY PATHOLOGIST: CPT

## 2019-09-04 PROCEDURE — 55866 LAPS SURG PRST8ECT RPBIC RAD: CPT | Performed by: PHYSICIAN ASSISTANT

## 2019-09-04 PROCEDURE — 6360000002 HC RX W HCPCS: Performed by: ANESTHESIOLOGY

## 2019-09-04 PROCEDURE — 86901 BLOOD TYPING SEROLOGIC RH(D): CPT

## 2019-09-04 PROCEDURE — 2580000003 HC RX 258: Performed by: UROLOGY

## 2019-09-04 PROCEDURE — 3600000019 HC SURGERY ROBOT ADDTL 15MIN: Performed by: UROLOGY

## 2019-09-04 PROCEDURE — 7100000000 HC PACU RECOVERY - FIRST 15 MIN: Performed by: UROLOGY

## 2019-09-04 PROCEDURE — 15777 ACELLULAR DERM MATRIX IMPLT: CPT | Performed by: UROLOGY

## 2019-09-04 PROCEDURE — 38571 LAPAROSCOPY LYMPHADENECTOMY: CPT | Performed by: PHYSICIAN ASSISTANT

## 2019-09-04 PROCEDURE — S2900 ROBOTIC SURGICAL SYSTEM: HCPCS | Performed by: UROLOGY

## 2019-09-04 PROCEDURE — 88309 TISSUE EXAM BY PATHOLOGIST: CPT

## 2019-09-04 PROCEDURE — 6370000000 HC RX 637 (ALT 250 FOR IP): Performed by: PHYSICIAN ASSISTANT

## 2019-09-04 PROCEDURE — 6360000002 HC RX W HCPCS

## 2019-09-04 PROCEDURE — 2709999900 HC NON-CHARGEABLE SUPPLY: Performed by: UROLOGY

## 2019-09-04 PROCEDURE — 7100000001 HC PACU RECOVERY - ADDTL 15 MIN: Performed by: UROLOGY

## 2019-09-04 PROCEDURE — 6360000002 HC RX W HCPCS: Performed by: NURSE ANESTHETIST, CERTIFIED REGISTERED

## 2019-09-04 PROCEDURE — 36415 COLL VENOUS BLD VENIPUNCTURE: CPT

## 2019-09-04 PROCEDURE — 3700000001 HC ADD 15 MINUTES (ANESTHESIA): Performed by: UROLOGY

## 2019-09-04 PROCEDURE — 2709999900 HC NON-CHARGEABLE SUPPLY

## 2019-09-04 PROCEDURE — 6360000002 HC RX W HCPCS: Performed by: UROLOGY

## 2019-09-04 PROCEDURE — 2500000003 HC RX 250 WO HCPCS: Performed by: UROLOGY

## 2019-09-04 PROCEDURE — 88305 TISSUE EXAM BY PATHOLOGIST: CPT

## 2019-09-04 PROCEDURE — 86850 RBC ANTIBODY SCREEN: CPT

## 2019-09-04 PROCEDURE — 38571 LAPAROSCOPY LYMPHADENECTOMY: CPT | Performed by: UROLOGY

## 2019-09-04 PROCEDURE — 2500000003 HC RX 250 WO HCPCS: Performed by: ANESTHESIOLOGY

## 2019-09-04 DEVICE — Z DUP USE 2648674 GRAFT HUM TISS W6XL3CM THK1MM UMB CRD AMNIO MEM CLARIX 1K: Type: IMPLANTABLE DEVICE | Status: FUNCTIONAL

## 2019-09-04 DEVICE — AGENT HEMOSTATIC SURGIFLOW MATRIX KIT W/THROMBIN: Type: IMPLANTABLE DEVICE | Site: ABDOMEN | Status: FUNCTIONAL

## 2019-09-04 RX ORDER — DEXAMETHASONE SODIUM PHOSPHATE 4 MG/ML
INJECTION, SOLUTION INTRA-ARTICULAR; INTRALESIONAL; INTRAMUSCULAR; INTRAVENOUS; SOFT TISSUE PRN
Status: DISCONTINUED | OUTPATIENT
Start: 2019-09-04 | End: 2019-09-04 | Stop reason: SDUPTHER

## 2019-09-04 RX ORDER — SODIUM CHLORIDE 9 MG/ML
INJECTION, SOLUTION INTRAVENOUS CONTINUOUS
Status: DISCONTINUED | OUTPATIENT
Start: 2019-09-04 | End: 2019-09-06

## 2019-09-04 RX ORDER — BUPIVACAINE HYDROCHLORIDE AND EPINEPHRINE 5; 5 MG/ML; UG/ML
INJECTION, SOLUTION EPIDURAL; INTRACAUDAL; PERINEURAL PRN
Status: DISCONTINUED | OUTPATIENT
Start: 2019-09-04 | End: 2019-09-04 | Stop reason: HOSPADM

## 2019-09-04 RX ORDER — SODIUM CHLORIDE 0.9 % (FLUSH) 0.9 %
10 SYRINGE (ML) INJECTION EVERY 12 HOURS SCHEDULED
Status: DISCONTINUED | OUTPATIENT
Start: 2019-09-04 | End: 2019-09-06 | Stop reason: HOSPADM

## 2019-09-04 RX ORDER — MORPHINE SULFATE 2 MG/ML
2 INJECTION, SOLUTION INTRAMUSCULAR; INTRAVENOUS
Status: DISCONTINUED | OUTPATIENT
Start: 2019-09-04 | End: 2019-09-06 | Stop reason: HOSPADM

## 2019-09-04 RX ORDER — ONDANSETRON 2 MG/ML
4 INJECTION INTRAMUSCULAR; INTRAVENOUS EVERY 6 HOURS PRN
Status: DISCONTINUED | OUTPATIENT
Start: 2019-09-04 | End: 2019-09-06 | Stop reason: HOSPADM

## 2019-09-04 RX ORDER — PROPOFOL 10 MG/ML
INJECTION, EMULSION INTRAVENOUS PRN
Status: DISCONTINUED | OUTPATIENT
Start: 2019-09-04 | End: 2019-09-04 | Stop reason: SDUPTHER

## 2019-09-04 RX ORDER — PROMETHAZINE HYDROCHLORIDE 25 MG/ML
12.5 INJECTION, SOLUTION INTRAMUSCULAR; INTRAVENOUS
Status: DISCONTINUED | OUTPATIENT
Start: 2019-09-04 | End: 2019-09-04 | Stop reason: HOSPADM

## 2019-09-04 RX ORDER — SENNA AND DOCUSATE SODIUM 50; 8.6 MG/1; MG/1
1 TABLET, FILM COATED ORAL 2 TIMES DAILY
Status: DISCONTINUED | OUTPATIENT
Start: 2019-09-04 | End: 2019-09-06 | Stop reason: HOSPADM

## 2019-09-04 RX ORDER — LOSARTAN POTASSIUM 25 MG/1
25 TABLET ORAL EVERY MORNING
Status: DISCONTINUED | OUTPATIENT
Start: 2019-09-05 | End: 2019-09-06 | Stop reason: HOSPADM

## 2019-09-04 RX ORDER — ONDANSETRON 2 MG/ML
INJECTION INTRAMUSCULAR; INTRAVENOUS PRN
Status: DISCONTINUED | OUTPATIENT
Start: 2019-09-04 | End: 2019-09-04 | Stop reason: SDUPTHER

## 2019-09-04 RX ORDER — LABETALOL 20 MG/4 ML (5 MG/ML) INTRAVENOUS SYRINGE
5 EVERY 10 MIN PRN
Status: DISCONTINUED | OUTPATIENT
Start: 2019-09-04 | End: 2019-09-04 | Stop reason: HOSPADM

## 2019-09-04 RX ORDER — MEPERIDINE HYDROCHLORIDE 25 MG/ML
12.5 INJECTION INTRAMUSCULAR; INTRAVENOUS; SUBCUTANEOUS EVERY 5 MIN PRN
Status: DISCONTINUED | OUTPATIENT
Start: 2019-09-04 | End: 2019-09-04 | Stop reason: HOSPADM

## 2019-09-04 RX ORDER — HYDRALAZINE HYDROCHLORIDE 20 MG/ML
INJECTION INTRAMUSCULAR; INTRAVENOUS
Status: COMPLETED
Start: 2019-09-04 | End: 2019-09-04

## 2019-09-04 RX ORDER — MORPHINE SULFATE 4 MG/ML
4 INJECTION, SOLUTION INTRAMUSCULAR; INTRAVENOUS
Status: DISCONTINUED | OUTPATIENT
Start: 2019-09-04 | End: 2019-09-06 | Stop reason: HOSPADM

## 2019-09-04 RX ORDER — HYDROCODONE BITARTRATE AND ACETAMINOPHEN 5; 325 MG/1; MG/1
1 TABLET ORAL EVERY 4 HOURS PRN
Status: DISCONTINUED | OUTPATIENT
Start: 2019-09-04 | End: 2019-09-06 | Stop reason: HOSPADM

## 2019-09-04 RX ORDER — HYDROCODONE BITARTRATE AND ACETAMINOPHEN 5; 325 MG/1; MG/1
2 TABLET ORAL EVERY 4 HOURS PRN
Status: DISCONTINUED | OUTPATIENT
Start: 2019-09-04 | End: 2019-09-06 | Stop reason: HOSPADM

## 2019-09-04 RX ORDER — FENTANYL CITRATE 50 UG/ML
INJECTION, SOLUTION INTRAMUSCULAR; INTRAVENOUS PRN
Status: DISCONTINUED | OUTPATIENT
Start: 2019-09-04 | End: 2019-09-04 | Stop reason: SDUPTHER

## 2019-09-04 RX ORDER — ATROPA BELLADONNA AND OPIUM 16.2; 3 MG/1; MG/1
30 SUPPOSITORY RECTAL EVERY 8 HOURS PRN
Status: DISCONTINUED | OUTPATIENT
Start: 2019-09-04 | End: 2019-09-06 | Stop reason: HOSPADM

## 2019-09-04 RX ORDER — FENTANYL CITRATE 50 UG/ML
25 INJECTION, SOLUTION INTRAMUSCULAR; INTRAVENOUS EVERY 5 MIN PRN
Status: DISCONTINUED | OUTPATIENT
Start: 2019-09-04 | End: 2019-09-04 | Stop reason: HOSPADM

## 2019-09-04 RX ORDER — ROCURONIUM BROMIDE 10 MG/ML
INJECTION, SOLUTION INTRAVENOUS PRN
Status: DISCONTINUED | OUTPATIENT
Start: 2019-09-04 | End: 2019-09-04 | Stop reason: SDUPTHER

## 2019-09-04 RX ORDER — FENTANYL CITRATE 50 UG/ML
50 INJECTION, SOLUTION INTRAMUSCULAR; INTRAVENOUS EVERY 5 MIN PRN
Status: DISCONTINUED | OUTPATIENT
Start: 2019-09-04 | End: 2019-09-04 | Stop reason: HOSPADM

## 2019-09-04 RX ORDER — METOPROLOL TARTRATE 5 MG/5ML
INJECTION INTRAVENOUS
Status: COMPLETED
Start: 2019-09-04 | End: 2019-09-04

## 2019-09-04 RX ORDER — DOCUSATE SODIUM 100 MG/1
100 CAPSULE, LIQUID FILLED ORAL 2 TIMES DAILY
Status: DISCONTINUED | OUTPATIENT
Start: 2019-09-04 | End: 2019-09-06 | Stop reason: HOSPADM

## 2019-09-04 RX ORDER — GLYCOPYRROLATE 1 MG/5 ML
SYRINGE (ML) INTRAVENOUS PRN
Status: DISCONTINUED | OUTPATIENT
Start: 2019-09-04 | End: 2019-09-04 | Stop reason: SDUPTHER

## 2019-09-04 RX ORDER — SIMVASTATIN 40 MG
20 TABLET ORAL NIGHTLY
Status: DISCONTINUED | OUTPATIENT
Start: 2019-09-05 | End: 2019-09-06 | Stop reason: HOSPADM

## 2019-09-04 RX ORDER — BACITRACIN, NEOMYCIN, POLYMYXIN B 400; 3.5; 5 [USP'U]/G; MG/G; [USP'U]/G
OINTMENT TOPICAL 2 TIMES DAILY
Status: DISCONTINUED | OUTPATIENT
Start: 2019-09-04 | End: 2019-09-06 | Stop reason: HOSPADM

## 2019-09-04 RX ORDER — LORAZEPAM 2 MG/ML
0.25 INJECTION INTRAMUSCULAR EVERY 5 MIN PRN
Status: DISCONTINUED | OUTPATIENT
Start: 2019-09-04 | End: 2019-09-04 | Stop reason: HOSPADM

## 2019-09-04 RX ORDER — BISACODYL 10 MG
10 SUPPOSITORY, RECTAL RECTAL DAILY PRN
Status: DISCONTINUED | OUTPATIENT
Start: 2019-09-04 | End: 2019-09-06 | Stop reason: HOSPADM

## 2019-09-04 RX ORDER — HYDROCHLOROTHIAZIDE 25 MG/1
12.5 TABLET ORAL DAILY
Status: DISCONTINUED | OUTPATIENT
Start: 2019-09-04 | End: 2019-09-06 | Stop reason: HOSPADM

## 2019-09-04 RX ORDER — NEOSTIGMINE METHYLSULFATE 5 MG/5 ML
SYRINGE (ML) INTRAVENOUS PRN
Status: DISCONTINUED | OUTPATIENT
Start: 2019-09-04 | End: 2019-09-04 | Stop reason: SDUPTHER

## 2019-09-04 RX ORDER — LIDOCAINE HCL/PF 100 MG/5ML
SYRINGE (ML) INJECTION PRN
Status: DISCONTINUED | OUTPATIENT
Start: 2019-09-04 | End: 2019-09-04 | Stop reason: SDUPTHER

## 2019-09-04 RX ORDER — SODIUM CHLORIDE 0.9 % (FLUSH) 0.9 %
10 SYRINGE (ML) INJECTION PRN
Status: DISCONTINUED | OUTPATIENT
Start: 2019-09-04 | End: 2019-09-06 | Stop reason: HOSPADM

## 2019-09-04 RX ORDER — SODIUM CHLORIDE 9 MG/ML
INJECTION, SOLUTION INTRAVENOUS CONTINUOUS
Status: DISCONTINUED | OUTPATIENT
Start: 2019-09-04 | End: 2019-09-04

## 2019-09-04 RX ADMIN — SODIUM CHLORIDE: 9 INJECTION, SOLUTION INTRAVENOUS at 13:34

## 2019-09-04 RX ADMIN — SENNOSIDES AND DOCUSATE SODIUM 1 TABLET: 8.6; 5 TABLET ORAL at 20:59

## 2019-09-04 RX ADMIN — Medication 100 MG: at 16:13

## 2019-09-04 RX ADMIN — LORAZEPAM 0.25 MG: 2 INJECTION INTRAMUSCULAR; INTRAVENOUS at 18:50

## 2019-09-04 RX ADMIN — CEFOXITIN 2 G: 2 INJECTION, POWDER, FOR SOLUTION INTRAVENOUS at 16:28

## 2019-09-04 RX ADMIN — LABETALOL 20 MG/4 ML (5 MG/ML) INTRAVENOUS SYRINGE 5 MG: at 19:24

## 2019-09-04 RX ADMIN — ONDANSETRON 4 MG: 2 INJECTION INTRAMUSCULAR; INTRAVENOUS at 20:14

## 2019-09-04 RX ADMIN — ROCURONIUM BROMIDE 50 MG: 10 INJECTION INTRAVENOUS at 16:13

## 2019-09-04 RX ADMIN — PHENYLEPHRINE HYDROCHLORIDE 150 MCG: 10 INJECTION INTRAVENOUS at 16:28

## 2019-09-04 RX ADMIN — FENTANYL CITRATE 50 MCG: 50 INJECTION INTRAMUSCULAR; INTRAVENOUS at 19:05

## 2019-09-04 RX ADMIN — SODIUM CHLORIDE: 9 INJECTION, SOLUTION INTRAVENOUS at 16:08

## 2019-09-04 RX ADMIN — DOCUSATE SODIUM 100 MG: 100 CAPSULE, LIQUID FILLED ORAL at 20:59

## 2019-09-04 RX ADMIN — Medication 5 MG: at 18:15

## 2019-09-04 RX ADMIN — ONDANSETRON HYDROCHLORIDE 4 MG: 4 INJECTION, SOLUTION INTRAMUSCULAR; INTRAVENOUS at 18:07

## 2019-09-04 RX ADMIN — ATROPA BELLADONNA AND OPIUM 30 MG: 16.2; 3 SUPPOSITORY RECTAL at 20:19

## 2019-09-04 RX ADMIN — Medication 0.8 MG: at 18:15

## 2019-09-04 RX ADMIN — LORAZEPAM 0.25 MG: 2 INJECTION INTRAMUSCULAR; INTRAVENOUS at 19:29

## 2019-09-04 RX ADMIN — FENTANYL CITRATE 150 MCG: 50 INJECTION INTRAMUSCULAR; INTRAVENOUS at 16:13

## 2019-09-04 RX ADMIN — PROPOFOL 160 MG: 10 INJECTION, EMULSION INTRAVENOUS at 16:13

## 2019-09-04 RX ADMIN — BACITRACIN ZINC NEOMYCIN SULFATE POLYMYXIN B SULFATE: 400; 3.5; 5 OINTMENT TOPICAL at 21:03

## 2019-09-04 RX ADMIN — HYDRALAZINE HYDROCHLORIDE 20 MG: 20 INJECTION INTRAMUSCULAR; INTRAVENOUS at 19:14

## 2019-09-04 RX ADMIN — FENTANYL CITRATE 50 MCG: 50 INJECTION INTRAMUSCULAR; INTRAVENOUS at 18:51

## 2019-09-04 RX ADMIN — DEXAMETHASONE SODIUM PHOSPHATE 10 MG: 4 INJECTION, SOLUTION INTRAMUSCULAR; INTRAVENOUS at 16:32

## 2019-09-04 RX ADMIN — FENTANYL CITRATE 100 MCG: 50 INJECTION INTRAMUSCULAR; INTRAVENOUS at 16:42

## 2019-09-04 RX ADMIN — METOPROLOL TARTRATE 2.5 MG: 5 INJECTION INTRAVENOUS at 18:32

## 2019-09-04 ASSESSMENT — PULMONARY FUNCTION TESTS
PIF_VALUE: 26
PIF_VALUE: 25
PIF_VALUE: 25
PIF_VALUE: 16
PIF_VALUE: 20
PIF_VALUE: 25
PIF_VALUE: 25
PIF_VALUE: 14
PIF_VALUE: 15
PIF_VALUE: 25
PIF_VALUE: 17
PIF_VALUE: 25
PIF_VALUE: 20
PIF_VALUE: 14
PIF_VALUE: 14
PIF_VALUE: 25
PIF_VALUE: 25
PIF_VALUE: 14
PIF_VALUE: 26
PIF_VALUE: 26
PIF_VALUE: 14
PIF_VALUE: 18
PIF_VALUE: 2
PIF_VALUE: 17
PIF_VALUE: 21
PIF_VALUE: 25
PIF_VALUE: 13
PIF_VALUE: 23
PIF_VALUE: 26
PIF_VALUE: 0
PIF_VALUE: 13
PIF_VALUE: 25
PIF_VALUE: 25
PIF_VALUE: 2
PIF_VALUE: 25
PIF_VALUE: 25
PIF_VALUE: 26
PIF_VALUE: 14
PIF_VALUE: 25
PIF_VALUE: 22
PIF_VALUE: 25
PIF_VALUE: 0
PIF_VALUE: 25
PIF_VALUE: 25
PIF_VALUE: 26
PIF_VALUE: 25
PIF_VALUE: 13
PIF_VALUE: 3
PIF_VALUE: 15
PIF_VALUE: 25
PIF_VALUE: 25
PIF_VALUE: 26
PIF_VALUE: 25
PIF_VALUE: 26
PIF_VALUE: 14
PIF_VALUE: 25
PIF_VALUE: 21
PIF_VALUE: 2
PIF_VALUE: 14
PIF_VALUE: 25
PIF_VALUE: 25
PIF_VALUE: 26
PIF_VALUE: 25
PIF_VALUE: 15
PIF_VALUE: 27
PIF_VALUE: 25
PIF_VALUE: 26
PIF_VALUE: 26
PIF_VALUE: 25
PIF_VALUE: 17
PIF_VALUE: 18
PIF_VALUE: 25
PIF_VALUE: 25
PIF_VALUE: 1
PIF_VALUE: 25
PIF_VALUE: 22
PIF_VALUE: 2
PIF_VALUE: 26
PIF_VALUE: 1
PIF_VALUE: 25
PIF_VALUE: 17
PIF_VALUE: 21
PIF_VALUE: 25
PIF_VALUE: 0
PIF_VALUE: 13
PIF_VALUE: 19
PIF_VALUE: 25
PIF_VALUE: 21
PIF_VALUE: 23
PIF_VALUE: 25
PIF_VALUE: 26
PIF_VALUE: 14
PIF_VALUE: 15
PIF_VALUE: 17
PIF_VALUE: 25
PIF_VALUE: 14
PIF_VALUE: 25
PIF_VALUE: 26
PIF_VALUE: 16
PIF_VALUE: 25
PIF_VALUE: 25
PIF_VALUE: 26
PIF_VALUE: 25
PIF_VALUE: 1
PIF_VALUE: 1
PIF_VALUE: 25
PIF_VALUE: 17
PIF_VALUE: 25
PIF_VALUE: 26
PIF_VALUE: 14
PIF_VALUE: 25
PIF_VALUE: 26
PIF_VALUE: 17
PIF_VALUE: 26
PIF_VALUE: 25
PIF_VALUE: 25
PIF_VALUE: 7
PIF_VALUE: 21

## 2019-09-04 ASSESSMENT — PAIN DESCRIPTION - FREQUENCY: FREQUENCY: CONTINUOUS

## 2019-09-04 ASSESSMENT — PAIN DESCRIPTION - LOCATION: LOCATION: PENIS;ABDOMEN

## 2019-09-04 ASSESSMENT — PAIN DESCRIPTION - ONSET: ONSET: ON-GOING

## 2019-09-04 ASSESSMENT — PAIN SCALES - GENERAL
PAINLEVEL_OUTOF10: 9
PAINLEVEL_OUTOF10: 8
PAINLEVEL_OUTOF10: 9
PAINLEVEL_OUTOF10: 8
PAINLEVEL_OUTOF10: 0
PAINLEVEL_OUTOF10: 8

## 2019-09-04 ASSESSMENT — PAIN DESCRIPTION - PAIN TYPE: TYPE: SURGICAL PAIN

## 2019-09-04 ASSESSMENT — PAIN - FUNCTIONAL ASSESSMENT
PAIN_FUNCTIONAL_ASSESSMENT: PREVENTS OR INTERFERES SOME ACTIVE ACTIVITIES AND ADLS
PAIN_FUNCTIONAL_ASSESSMENT: 0-10

## 2019-09-04 ASSESSMENT — PAIN DESCRIPTION - DESCRIPTORS: DESCRIPTORS: SORE

## 2019-09-04 ASSESSMENT — PAIN DESCRIPTION - PROGRESSION: CLINICAL_PROGRESSION: NOT CHANGED

## 2019-09-04 NOTE — ANESTHESIA PRE PROCEDURE
Department of Anesthesiology  Preprocedure Note       Name:  Tiara Jama   Age:  [de-identified] y.o.  :  1940                                          MRN:  [id number]         Date:  2019      Surgeon: Paris Litten):  Zachariah Castaneda MD    Procedure: ROBOT ASSISTED LAPAROSCOPIC RADICAL PROSTATECTOMY (N/A Abdomen)    Medications prior to admission:   Prior to Admission medications    Medication Sig Start Date End Date Taking? Authorizing Provider   MULTIPLE VITAMIN PO Take by mouth Takes 1/2 tablet every morning   Yes Historical Provider, MD   finasteride (PROSCAR) 5 MG tablet Take 5 mg by mouth daily   Yes Historical Provider, MD   tamsulosin (FLOMAX) 0.4 MG capsule Take 0.4 mg by mouth daily   Yes Historical Provider, MD   hydrochlorothiazide (HYDRODIURIL) 25 MG tablet Take 12.5 mg by mouth daily    Yes Historical Provider, MD   losartan (COZAAR) 50 MG tablet Take 25 mg by mouth every morning    Yes Historical Provider, MD   simvastatin (ZOCOR) 40 MG tablet Take 20 mg by mouth nightly Takes 1/2 tablet at night   Yes Historical Provider, MD   aspirin 81 MG tablet Take 81 mg by mouth daily    Historical Provider, MD   FREESTYLE LITE strip USE 1 STRIP TO CHECK GLUCOSE ONCE DAILY AS DIRECTED 19   Historical Provider, MD   Cholecalciferol (VITAMIN D3) 2000 units CAPS Take 1 capsule by mouth daily    Historical Provider, MD       Current medications:    Current Facility-Administered Medications   Medication Dose Route Frequency Provider Last Rate Last Dose    0.9 % sodium chloride infusion   Intravenous Continuous Zachariah Castaneda  mL/hr at 19 1334      cefOXitin (MEFOXIN) 2 g in dextrose 5% 50 mL (mini-bag)  2 g Intravenous 30 Min Pre-Op Zachariah Castaneda MD           Allergies:     Allergies   Allergen Reactions    Lisinopril Other (See Comments)     coughing  Other reaction(s): Cough       Problem List:    Patient Active Problem List   Diagnosis Code    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

## 2019-09-04 NOTE — PROGRESS NOTES
Admit to sds. Fall risk and allergy band applied to the patient. Agatha Lamb, daughter, Mariana, and Justin Batres with the patient.

## 2019-09-05 LAB
ANION GAP SERPL CALCULATED.3IONS-SCNC: 13 MEQ/L (ref 8–16)
BASOPHILS # BLD: 0.1 %
BASOPHILS ABSOLUTE: 0 THOU/MM3 (ref 0–0.1)
BUN BLDV-MCNC: 11 MG/DL (ref 7–22)
CALCIUM SERPL-MCNC: 8.5 MG/DL (ref 8.5–10.5)
CHLORIDE BLD-SCNC: 103 MEQ/L (ref 98–111)
CO2: 21 MEQ/L (ref 23–33)
CREAT SERPL-MCNC: 1.1 MG/DL (ref 0.4–1.2)
EOSINOPHIL # BLD: 0 %
EOSINOPHILS ABSOLUTE: 0 THOU/MM3 (ref 0–0.4)
ERYTHROCYTE [DISTWIDTH] IN BLOOD BY AUTOMATED COUNT: 13.9 % (ref 11.5–14.5)
ERYTHROCYTE [DISTWIDTH] IN BLOOD BY AUTOMATED COUNT: 47.8 FL (ref 35–45)
GFR SERPL CREATININE-BSD FRML MDRD: 64 ML/MIN/1.73M2
GLUCOSE BLD-MCNC: 165 MG/DL (ref 70–108)
HCT VFR BLD CALC: 37.6 % (ref 42–52)
HEMOGLOBIN: 11.9 GM/DL (ref 14–18)
IMMATURE GRANS (ABS): 0.06 THOU/MM3 (ref 0–0.07)
IMMATURE GRANULOCYTES: 0 %
LYMPHOCYTES # BLD: 4.9 %
LYMPHOCYTES ABSOLUTE: 0.7 THOU/MM3 (ref 1–4.8)
MCH RBC QN AUTO: 30 PG (ref 26–33)
MCHC RBC AUTO-ENTMCNC: 31.6 GM/DL (ref 32.2–35.5)
MCV RBC AUTO: 94.7 FL (ref 80–94)
MONOCYTES # BLD: 7.2 %
MONOCYTES ABSOLUTE: 1 THOU/MM3 (ref 0.4–1.3)
NUCLEATED RED BLOOD CELLS: 0 /100 WBC
PLATELET # BLD: 208 THOU/MM3 (ref 130–400)
PLATELET ESTIMATE: ADEQUATE
PMV BLD AUTO: 9.5 FL (ref 9.4–12.4)
POTASSIUM SERPL-SCNC: 4.2 MEQ/L (ref 3.5–5.2)
RBC # BLD: 3.97 MILL/MM3 (ref 4.7–6.1)
SCAN OF BLOOD SMEAR: NORMAL
SEG NEUTROPHILS: 87.4 %
SEGMENTED NEUTROPHILS ABSOLUTE COUNT: 12 THOU/MM3 (ref 1.8–7.7)
SODIUM BLD-SCNC: 137 MEQ/L (ref 135–145)
WBC # BLD: 13.7 THOU/MM3 (ref 4.8–10.8)

## 2019-09-05 PROCEDURE — 6360000002 HC RX W HCPCS: Performed by: UROLOGY

## 2019-09-05 PROCEDURE — 80048 BASIC METABOLIC PNL TOTAL CA: CPT

## 2019-09-05 PROCEDURE — 96361 HYDRATE IV INFUSION ADD-ON: CPT

## 2019-09-05 PROCEDURE — 96360 HYDRATION IV INFUSION INIT: CPT

## 2019-09-05 PROCEDURE — 85025 COMPLETE CBC W/AUTO DIFF WBC: CPT

## 2019-09-05 PROCEDURE — 96365 THER/PROPH/DIAG IV INF INIT: CPT

## 2019-09-05 PROCEDURE — 6370000000 HC RX 637 (ALT 250 FOR IP): Performed by: NURSE PRACTITIONER

## 2019-09-05 PROCEDURE — 6360000002 HC RX W HCPCS: Performed by: PHYSICIAN ASSISTANT

## 2019-09-05 PROCEDURE — 96368 THER/DIAG CONCURRENT INF: CPT

## 2019-09-05 PROCEDURE — 2580000003 HC RX 258: Performed by: UROLOGY

## 2019-09-05 PROCEDURE — 2580000003 HC RX 258: Performed by: PHYSICIAN ASSISTANT

## 2019-09-05 PROCEDURE — 36415 COLL VENOUS BLD VENIPUNCTURE: CPT

## 2019-09-05 PROCEDURE — APPNB30 APP NON BILLABLE TIME 0-30 MINS: Performed by: NURSE PRACTITIONER

## 2019-09-05 PROCEDURE — 99024 POSTOP FOLLOW-UP VISIT: CPT | Performed by: NURSE PRACTITIONER

## 2019-09-05 PROCEDURE — 6370000000 HC RX 637 (ALT 250 FOR IP): Performed by: PHYSICIAN ASSISTANT

## 2019-09-05 PROCEDURE — 2709999900 HC NON-CHARGEABLE SUPPLY

## 2019-09-05 RX ORDER — PSEUDOEPHEDRINE HCL 30 MG
100 TABLET ORAL 2 TIMES DAILY
COMMUNITY
Start: 2019-09-05 | End: 2019-09-10

## 2019-09-05 RX ORDER — HYDROCODONE BITARTRATE AND ACETAMINOPHEN 5; 325 MG/1; MG/1
1 TABLET ORAL EVERY 4 HOURS PRN
Qty: 10 TABLET | Refills: 0 | Status: SHIPPED | OUTPATIENT
Start: 2019-09-05 | End: 2019-09-08

## 2019-09-05 RX ORDER — BISACODYL 10 MG
10 SUPPOSITORY, RECTAL RECTAL ONCE
Status: COMPLETED | OUTPATIENT
Start: 2019-09-05 | End: 2019-09-05

## 2019-09-05 RX ORDER — CIPROFLOXACIN 500 MG/1
TABLET, FILM COATED ORAL
Qty: 12 TABLET | Refills: 0 | Status: SHIPPED | OUTPATIENT
Start: 2019-09-05 | End: 2019-09-10

## 2019-09-05 RX ADMIN — SODIUM CHLORIDE: 9 INJECTION, SOLUTION INTRAVENOUS at 17:08

## 2019-09-05 RX ADMIN — HYDROCHLOROTHIAZIDE 12.5 MG: 25 TABLET ORAL at 08:42

## 2019-09-05 RX ADMIN — BISACODYL 10 MG: 10 SUPPOSITORY RECTAL at 11:22

## 2019-09-05 RX ADMIN — DOCUSATE SODIUM 100 MG: 100 CAPSULE, LIQUID FILLED ORAL at 08:44

## 2019-09-05 RX ADMIN — HYDROCODONE BITARTRATE AND ACETAMINOPHEN 2 TABLET: 5; 325 TABLET ORAL at 20:49

## 2019-09-05 RX ADMIN — CEFOXITIN SODIUM 2 G: 2 POWDER, FOR SOLUTION INTRAVENOUS at 00:21

## 2019-09-05 RX ADMIN — MAGNESIUM HYDROXIDE 30 ML: 400 SUSPENSION ORAL at 14:03

## 2019-09-05 RX ADMIN — LOSARTAN POTASSIUM 25 MG: 25 TABLET ORAL at 08:43

## 2019-09-05 RX ADMIN — Medication 20 MG: at 20:47

## 2019-09-05 RX ADMIN — SODIUM CHLORIDE: 9 INJECTION, SOLUTION INTRAVENOUS at 06:20

## 2019-09-05 RX ADMIN — BACITRACIN ZINC NEOMYCIN SULFATE POLYMYXIN B SULFATE: 400; 3.5; 5 OINTMENT TOPICAL at 20:47

## 2019-09-05 RX ADMIN — SENNOSIDES AND DOCUSATE SODIUM 1 TABLET: 8.6; 5 TABLET ORAL at 20:50

## 2019-09-05 RX ADMIN — BACITRACIN ZINC NEOMYCIN SULFATE POLYMYXIN B SULFATE: 400; 3.5; 5 OINTMENT TOPICAL at 08:45

## 2019-09-05 RX ADMIN — SENNOSIDES AND DOCUSATE SODIUM 1 TABLET: 8.6; 5 TABLET ORAL at 08:44

## 2019-09-05 RX ADMIN — CEFOXITIN SODIUM 2 G: 2 POWDER, FOR SOLUTION INTRAVENOUS at 08:45

## 2019-09-05 RX ADMIN — ENOXAPARIN SODIUM 40 MG: 40 INJECTION SUBCUTANEOUS at 11:22

## 2019-09-05 RX ADMIN — DOCUSATE SODIUM 100 MG: 100 CAPSULE, LIQUID FILLED ORAL at 20:50

## 2019-09-05 ASSESSMENT — PAIN SCALES - GENERAL
PAINLEVEL_OUTOF10: 2
PAINLEVEL_OUTOF10: 2
PAINLEVEL_OUTOF10: 3
PAINLEVEL_OUTOF10: 8
PAINLEVEL_OUTOF10: 4
PAINLEVEL_OUTOF10: 0
PAINLEVEL_OUTOF10: 7

## 2019-09-05 ASSESSMENT — PAIN DESCRIPTION - FREQUENCY
FREQUENCY: CONTINUOUS

## 2019-09-05 ASSESSMENT — PAIN DESCRIPTION - DESCRIPTORS
DESCRIPTORS: SORE
DESCRIPTORS: CONSTANT;DISCOMFORT;TENDER
DESCRIPTORS: CONSTANT;DISCOMFORT

## 2019-09-05 ASSESSMENT — PAIN DESCRIPTION - PAIN TYPE
TYPE: SURGICAL PAIN

## 2019-09-05 ASSESSMENT — PAIN DESCRIPTION - LOCATION
LOCATION: ABDOMEN
LOCATION: ABDOMEN
LOCATION: PENIS;ABDOMEN

## 2019-09-05 ASSESSMENT — PAIN DESCRIPTION - ORIENTATION
ORIENTATION: LOWER
ORIENTATION: LOWER

## 2019-09-05 ASSESSMENT — PAIN DESCRIPTION - ONSET: ONSET: ON-GOING

## 2019-09-05 ASSESSMENT — PAIN - FUNCTIONAL ASSESSMENT: PAIN_FUNCTIONAL_ASSESSMENT: PREVENTS OR INTERFERES SOME ACTIVE ACTIVITIES AND ADLS

## 2019-09-05 ASSESSMENT — PAIN DESCRIPTION - PROGRESSION: CLINICAL_PROGRESSION: NOT CHANGED

## 2019-09-05 NOTE — PLAN OF CARE
at discharge, support of older children at discharge. Care plan reviewed with patient and family. Patient and family verbalize understanding of the plan of care and contribute to goal setting.

## 2019-09-05 NOTE — OP NOTE
K[facility] Reece 59  RECORD OF OPERATION     PATIENT NAME: David Dinh               MEDICAL RECORD NO. [id number]                DATE OF PROCEDURE: 09/04/2019  SURGEON: Lenita Rocker A. Cicero Landau, MD  PRIMARY CARE PHYSICIAN: SERVANDO Ferreira - CNP        PREOPERATIVE DIAGNOSIS: Prostate cancer      POSTOPERATIVE DIAGNOSIS: Prostate cancer      PROCEDURE PERFORMED: Robot-Assisted Laparoscopic Radical Prostatectomy (RALRP) and bilateral pelvic lymph node dissection and bilateral nerve wrap     SURGEON: Tomie France. Cicero Landau, MD    ASSISTANT(S): Kyaw Cox PA-C     ANESTHESIA: General     BLOOD LOSS:  50 cc     SPECIMENS: Prostate and seminal vesicles, bilateral pelvic lymph nodes     COMPLICATIONS:  None immediately appreciated. DISCUSSION:  Pascale Bill is a [de-identified]y.o.-year-old male who has a diagnosis of Prostate cancer, Abel grade 7, grade group 2, and PSA of 4.42. After a history and physical examination was performed, potential diagnostic and therapeutic modalities were discussed with the patient. RALRP was recommended and a discussion of the risks, possible complications, possible side effects, along with the anticipated benefits were reviewed. We also discussed those things we would be doing pre-operatively, intra-operatively and post-operatively to minimize these side effects. He was given the opportunity to ask questions. Once answered, informed consent was obtained. He was brought to the operating on 09/04/2019 for the procedure. DESCRIPTION OF PROCEDURE: The patient was brought to the Operating Room and placed supine on the operating room table. After initiation of anesthesia, he was positioned on the beanbag and placed in a lithotomy position. He was well-padded and secured to the table with the beanbag and then his abdomen was clipped and cleaned. He was prepped and draped in the standard fashion for surgery.      We began by obtaining pneumoperitoneum through a small, transverse,

## 2019-09-05 NOTE — PROGRESS NOTES
Urology Progress Note    Chief Complaint: prostate cancer    Subjective:     Pt up in chair. Reports feeling better this morning but having some tightness and discomfort in lower abdomen. Denies chest pain or shortness of breath. Using incentive spirometer. Ambulating in campos with assistance without difficulty. Denies leg pain or new leg swelling. Rodriguez with faint pink mainly yellow clear urine.           Vitals:  /78   Pulse 90   Temp 98.3 °F (36.8 °C) (Oral)   Resp 14   Ht 5' 11\" (1.803 m)   Wt 202 lb 12.8 oz (92 kg)   SpO2 93%   BMI 28.28 kg/m²   Temp  Av.4 °F (35.8 °C)  Min: 95.7 °F (35.4 °C)  Max: 98.3 °F (36.8 °C)    Intake/Output Summary (Last 24 hours) at 2019 0959  Last data filed at 2019 0848  Gross per 24 hour   Intake 1000 ml   Output 1600 ml   Net -600 ml       Social History     Socioeconomic History    Marital status:      Spouse name: Not on file    Number of children: Not on file    Years of education: Not on file    Highest education level: Not on file   Occupational History    Not on file   Social Needs    Financial resource strain: Not on file    Food insecurity:     Worry: Not on file     Inability: Not on file    Transportation needs:     Medical: Not on file     Non-medical: Not on file   Tobacco Use    Smoking status: Former Smoker     Packs/day: 1.00     Years: 38.00     Pack years: 38.00     Last attempt to quit:      Years since quittin.6    Smokeless tobacco: Never Used   Substance and Sexual Activity    Alcohol use: Yes     Comment: socially beer on weekends    Drug use: No    Sexual activity: Not on file   Lifestyle    Physical activity:     Days per week: Not on file     Minutes per session: Not on file    Stress: Not on file   Relationships    Social connections:     Talks on phone: Not on file     Gets together: Not on file     Attends [employer] service: Not on file     Active member of club or organization: Not on file Lab Results   Component Value Date     09/05/2019    K 4.2 09/05/2019    K 3.3 06/22/2018     09/05/2019    CO2 21 09/05/2019    BUN 11 09/05/2019    LABALBU 4.0 12/11/2018    CREATININE 1.1 09/05/2019    CALCIUM 8.5 09/05/2019    LABGLOM 64 09/05/2019       Impression:  Prostate cancer  Hx kidney stones  Hx appendiceal ca    Plan:    POD # 1 ROBOT ASSISTED LAPAROSCOPIC RADICAL PROSTATECTOMY WITH BILATERAL PELVIC LYMPH NODE DISSECTION, PLACEMENT OF CLARIX CORD AT [address] [place] [address] by Dr. Hussein Hooper 9/4/19. EBL 50 mls. Pt doing well. Having some abdominal tightness and distension. Not passing flatus yet. Will advance activity and give dulcolax suppository this am.  If better bowel function later today will consider d/c home. Nursing staff to call me with update.         SERVANDO Lilly-CNP  09/05/19 9:59 AM  Urology

## 2019-09-06 VITALS
TEMPERATURE: 98 F | RESPIRATION RATE: 16 BRPM | DIASTOLIC BLOOD PRESSURE: 71 MMHG | OXYGEN SATURATION: 98 % | BODY MASS INDEX: 28.39 KG/M2 | WEIGHT: 202.8 LBS | HEIGHT: 71 IN | SYSTOLIC BLOOD PRESSURE: 160 MMHG | HEART RATE: 97 BPM

## 2019-09-06 PROCEDURE — 6370000000 HC RX 637 (ALT 250 FOR IP): Performed by: PHYSICIAN ASSISTANT

## 2019-09-06 PROCEDURE — 96361 HYDRATE IV INFUSION ADD-ON: CPT

## 2019-09-06 PROCEDURE — APPNB45 APP NON BILLABLE 31-45 MINUTES: Performed by: NURSE PRACTITIONER

## 2019-09-06 PROCEDURE — 99024 POSTOP FOLLOW-UP VISIT: CPT | Performed by: NURSE PRACTITIONER

## 2019-09-06 PROCEDURE — 2580000003 HC RX 258: Performed by: PHYSICIAN ASSISTANT

## 2019-09-06 PROCEDURE — 2709999900 HC NON-CHARGEABLE SUPPLY

## 2019-09-06 PROCEDURE — 6370000000 HC RX 637 (ALT 250 FOR IP): Performed by: NURSE PRACTITIONER

## 2019-09-06 PROCEDURE — 6360000002 HC RX W HCPCS: Performed by: PHYSICIAN ASSISTANT

## 2019-09-06 RX ORDER — ACETAMINOPHEN 500 MG
1000 TABLET ORAL EVERY 8 HOURS PRN
Status: DISCONTINUED | OUTPATIENT
Start: 2019-09-06 | End: 2019-09-06 | Stop reason: HOSPADM

## 2019-09-06 RX ORDER — BISACODYL 10 MG
10 SUPPOSITORY, RECTAL RECTAL ONCE
Status: COMPLETED | OUTPATIENT
Start: 2019-09-06 | End: 2019-09-06

## 2019-09-06 RX ORDER — OXYBUTYNIN CHLORIDE 5 MG/1
5 TABLET ORAL 3 TIMES DAILY PRN
Qty: 30 TABLET | Refills: 0 | Status: SHIPPED | OUTPATIENT
Start: 2019-09-06 | End: 2019-11-05

## 2019-09-06 RX ORDER — POLYETHYLENE GLYCOL 3350 17 G/17G
17 POWDER, FOR SOLUTION ORAL DAILY
Status: DISCONTINUED | OUTPATIENT
Start: 2019-09-06 | End: 2019-09-06 | Stop reason: HOSPADM

## 2019-09-06 RX ADMIN — HYDROCHLOROTHIAZIDE 12.5 MG: 25 TABLET ORAL at 08:44

## 2019-09-06 RX ADMIN — BISACODYL 10 MG: 10 SUPPOSITORY RECTAL at 08:47

## 2019-09-06 RX ADMIN — LOSARTAN POTASSIUM 25 MG: 25 TABLET ORAL at 08:44

## 2019-09-06 RX ADMIN — ENOXAPARIN SODIUM 40 MG: 40 INJECTION SUBCUTANEOUS at 09:54

## 2019-09-06 RX ADMIN — BACITRACIN ZINC NEOMYCIN SULFATE POLYMYXIN B SULFATE: 400; 3.5; 5 OINTMENT TOPICAL at 08:47

## 2019-09-06 RX ADMIN — POLYETHYLENE GLYCOL 3350 17 G: 17 POWDER, FOR SOLUTION ORAL at 08:47

## 2019-09-06 RX ADMIN — DOCUSATE SODIUM 100 MG: 100 CAPSULE, LIQUID FILLED ORAL at 08:47

## 2019-09-06 RX ADMIN — SODIUM CHLORIDE: 9 INJECTION, SOLUTION INTRAVENOUS at 02:31

## 2019-09-06 RX ADMIN — SENNOSIDES AND DOCUSATE SODIUM 1 TABLET: 8.6; 5 TABLET ORAL at 08:48

## 2019-09-06 ASSESSMENT — PAIN SCALES - GENERAL
PAINLEVEL_OUTOF10: 0
PAINLEVEL_OUTOF10: 0

## 2019-09-06 NOTE — DISCHARGE SUMMARY
DISCHARGE SUMMARY NOTE:      Patient Identification  Katelin Red is a [de-identified] y.o. male. :  1940  Admit Date:  2019    Discharge date:   2019                                   Disposition: home    Discharge Diagnoses:   Patient Active Problem List   Diagnosis    Appendix disease    Appendiceal tumor    S/P right colectomy    Renal stones    Prostate cancer ([facility] [address]         Consults: none    Surgery: Robot-Assisted Laparoscopic Radical Prostatectomy (RALRP) and bilateral pelvic lymph node dissection and bilateral nerve wrap    Patient Instructions: Activity: no heavy lifting, pushing, pulling for 6 weeks, no driving for 2 weeks or while on analgesics  Diet: As tolerated  Follow-up with myself in the office on 9/10/19 as scheduled    Hospital course: Patient under went RALRP with no complications. Post-operatively course remained stable. Patient is tolerating diet, moffett draining clear yellow urine, pain is minimal, ambulating well with assistance, having flatus and BM.       Time spent for discharge 31 minutes    [id number]  2019 11:10 AM

## 2019-09-10 ENCOUNTER — TELEPHONE (OUTPATIENT)
Dept: UROLOGY | Age: 79
End: 2019-09-10

## 2019-09-10 ENCOUNTER — OFFICE VISIT (OUTPATIENT)
Dept: UROLOGY | Age: 79
End: 2019-09-10

## 2019-09-10 VITALS
WEIGHT: 203 LBS | DIASTOLIC BLOOD PRESSURE: 78 MMHG | BODY MASS INDEX: 29.06 KG/M2 | HEIGHT: 70 IN | SYSTOLIC BLOOD PRESSURE: 136 MMHG

## 2019-09-10 DIAGNOSIS — C61 PROSTATE CANCER (HCC): Primary | ICD-10-CM

## 2019-09-10 PROCEDURE — 99024 POSTOP FOLLOW-UP VISIT: CPT | Performed by: NURSE PRACTITIONER

## 2019-09-10 RX ORDER — SILDENAFIL CITRATE 20 MG/1
20 TABLET ORAL DAILY
Qty: 30 TABLET | Refills: 3 | Status: SHIPPED | OUTPATIENT
Start: 2019-09-10 | End: 2019-09-10 | Stop reason: SDUPTHER

## 2019-09-10 RX ORDER — SILDENAFIL CITRATE 20 MG/1
20 TABLET ORAL DAILY
Qty: 30 TABLET | Refills: 3 | Status: SHIPPED | OUTPATIENT
Start: 2019-09-10 | End: 2019-11-05 | Stop reason: SDUPTHER

## 2019-09-10 NOTE — PROGRESS NOTES
Cancer    Pathology was reviewed with patient, pt was noted to have extraprostatic extension and pT3 pathology. Discussed that we will need to consider referral to radiation oncology for further discussion of possible radiation in follow-up. Await first post-op PSA in 8 weeks. Bone scan prior to follow up appt. Decipher pre-op high-risk. Rodriguez catheter and staples removed today     Encouraged to start kegel exercises today    Start sildenafil 20 mg daily. Script and Goodrx coupon provided. Ok to drive if not taking narcotic pain medicine. No heavy lifting, pushing, pulling greater than 10 pounds. Stop Colace and MOM. Follow BRAT diet. Call the office if diarrhea fails to improve or worsens. Follow-up 2 months with PSA and bone scan prior.

## 2019-09-10 NOTE — PATIENT INSTRUCTIONS
daily with food can help this discomfort. Avoid sitting on hard surfaces for extended periods of time. Sometimes, sitting on a heating pad or applying ice packs to the area will help as well. If you have a vacuum erection device, you may start using it 2 weeks after the catheter removal.    You may resume sexual activity after catheter removal.     Sildenafil: Patient drug information   Brand Names: [place]   Revatio;   Viagra    What is this drug used for?  It is used to treat erectile dysfunction (ED).  It is used to treat high blood pressure in the lungs.  It may be given to you for other reasons. Talk with the doctor. What do I need to tell my doctor BEFORE I take this drug? For all uses of this drug:    If you have an allergy to sildenafil or any other part of this drug.  If you are allergic to any drugs like this one, any other drugs, foods, or other substances. Tell your doctor about the allergy and what signs you had, like rash; hives; itching; shortness of breath; wheezing; cough; swelling of face, lips, tongue, or throat; or any other signs.  If you have a health problem called pulmonary veno-occlusive disease (PVOD).  If you use drugs called \"poppers\" like amyl nitrite and butyl nitrite.  If you are taking any of these drugs: Isosorbide dinitrate, isosorbide mononitrate, nitroglycerin, riociguat, or ritonavir.  If you are taking another drug that has the same drug in it.  If you are taking another drug like this one that is used to treat erectile dysfunction (ED) or high pressure in the lungs. For erectile dysfunction (ED):    If you have been told that you are not healthy enough to have sex.  If you are a woman. This drug is not approved for use in women.  If the patient is a child. This drug is not approved for use in children. This is not a list of all drugs or health problems that interact with this drug.    Tell your doctor and pharmacist about all of your

## 2019-09-18 ENCOUNTER — HOSPITAL ENCOUNTER (OUTPATIENT)
Dept: NUCLEAR MEDICINE | Age: 79
Discharge: HOME | End: 2019-09-18
Payer: MEDICARE

## 2019-09-18 DIAGNOSIS — C61 PROSTATE CANCER (HCC): ICD-10-CM

## 2019-09-18 PROCEDURE — 78306 BONE IMAGING WHOLE BODY: CPT

## 2019-09-18 PROCEDURE — A9503 TC99M MEDRONATE: HCPCS | Performed by: NURSE PRACTITIONER

## 2019-09-18 PROCEDURE — 3430000000 HC RX DIAGNOSTIC RADIOPHARMACEUTICAL: Performed by: NURSE PRACTITIONER

## 2019-09-18 RX ORDER — TC 99M MEDRONATE 20 MG/10ML
27.9 INJECTION, POWDER, LYOPHILIZED, FOR SOLUTION INTRAVENOUS
Status: COMPLETED | OUTPATIENT
Start: 2019-09-18 | End: 2019-09-18

## 2019-09-18 RX ADMIN — TC 99M MEDRONATE 27.9 MILLICURIE: 20 INJECTION, POWDER, LYOPHILIZED, FOR SOLUTION INTRAVENOUS at 09:35

## 2019-09-24 ENCOUNTER — TELEPHONE (OUTPATIENT)
Dept: UROLOGY | Age: 79
End: 2019-09-24

## 2019-09-24 NOTE — TELEPHONE ENCOUNTER
Please let pt know bone scan showed findings likely secondary to degenerative disease and no convincing evidence of spread of prostate cancer to the bones.

## 2019-10-18 ENCOUNTER — HOSPITAL ENCOUNTER (OUTPATIENT)
Age: 79
Discharge: HOME | End: 2019-10-18
Payer: MEDICARE

## 2019-10-18 DIAGNOSIS — C61 PROSTATE CANCER (HCC): ICD-10-CM

## 2019-10-18 LAB — PROSTATE SPECIFIC ANTIGEN: < 0.02 NG/ML (ref 0–1)

## 2019-10-18 PROCEDURE — 36415 COLL VENOUS BLD VENIPUNCTURE: CPT

## 2019-10-18 PROCEDURE — 84153 ASSAY OF PSA TOTAL: CPT

## 2019-11-05 ENCOUNTER — OFFICE VISIT (OUTPATIENT)
Dept: UROLOGY | Age: 79
End: 2019-11-05
Payer: MEDICARE

## 2019-11-05 VITALS
SYSTOLIC BLOOD PRESSURE: 122 MMHG | DIASTOLIC BLOOD PRESSURE: 74 MMHG | BODY MASS INDEX: 29.79 KG/M2 | HEIGHT: 70 IN | WEIGHT: 208.1 LBS

## 2019-11-05 DIAGNOSIS — C18.1 CANCER OF APPENDIX (HCC): Primary | ICD-10-CM

## 2019-11-05 DIAGNOSIS — C61 PROSTATE CANCER (HCC): Primary | ICD-10-CM

## 2019-11-05 LAB
BILIRUBIN URINE: NEGATIVE
BLOOD URINE, POC: NORMAL
CHARACTER, URINE: CLEAR
COLOR, URINE: YELLOW
GLUCOSE URINE: NEGATIVE MG/DL
KETONES, URINE: NEGATIVE
LEUKOCYTE CLUMPS, URINE: NEGATIVE
NITRITE, URINE: NEGATIVE
PH, URINE: 7 (ref 5–9)
PROTEIN, URINE: NEGATIVE MG/DL
SPECIFIC GRAVITY, URINE: 1.01 (ref 1–1.03)
UROBILINOGEN, URINE: 0.2 EU/DL (ref 0–1)

## 2019-11-05 PROCEDURE — G8427 DOCREV CUR MEDS BY ELIG CLIN: HCPCS | Performed by: NURSE PRACTITIONER

## 2019-11-05 PROCEDURE — G8417 CALC BMI ABV UP PARAM F/U: HCPCS | Performed by: NURSE PRACTITIONER

## 2019-11-05 PROCEDURE — 1123F ACP DISCUSS/DSCN MKR DOCD: CPT | Performed by: NURSE PRACTITIONER

## 2019-11-05 PROCEDURE — 99024 POSTOP FOLLOW-UP VISIT: CPT | Performed by: NURSE PRACTITIONER

## 2019-11-05 PROCEDURE — 1036F TOBACCO NON-USER: CPT | Performed by: NURSE PRACTITIONER

## 2019-11-05 PROCEDURE — 81003 URINALYSIS AUTO W/O SCOPE: CPT | Performed by: NURSE PRACTITIONER

## 2019-11-05 PROCEDURE — G8484 FLU IMMUNIZE NO ADMIN: HCPCS | Performed by: NURSE PRACTITIONER

## 2019-11-05 PROCEDURE — 4040F PNEUMOC VAC/ADMIN/RCVD: CPT | Performed by: NURSE PRACTITIONER

## 2019-11-05 RX ORDER — SILDENAFIL CITRATE 20 MG/1
20 TABLET ORAL DAILY
Qty: 30 TABLET | Refills: 3 | Status: SHIPPED | OUTPATIENT
Start: 2019-11-05 | End: 2020-02-11 | Stop reason: SDUPTHER

## 2019-11-06 ENCOUNTER — HOSPITAL ENCOUNTER (OUTPATIENT)
Age: 79
Discharge: HOME | End: 2019-11-06
Payer: MEDICARE

## 2019-11-06 ENCOUNTER — HOSPITAL ENCOUNTER (OUTPATIENT)
Dept: CT IMAGING | Age: 79
Discharge: HOME | End: 2019-11-06
Payer: MEDICARE

## 2019-11-06 DIAGNOSIS — C18.1 CANCER OF APPENDIX (HCC): ICD-10-CM

## 2019-11-06 DIAGNOSIS — Z90.49 S/P RIGHT COLECTOMY: ICD-10-CM

## 2019-11-06 LAB
CREAT SERPL-MCNC: 1.2 MG/DL (ref 0.6–1.3)
GFR, ESTIMATED: 62 ML/MIN/1.73M2

## 2019-11-06 PROCEDURE — 74177 CT ABD & PELVIS W/CONTRAST: CPT

## 2019-11-06 PROCEDURE — 36415 COLL VENOUS BLD VENIPUNCTURE: CPT

## 2019-11-06 PROCEDURE — 6360000004 HC RX CONTRAST MEDICATION: Performed by: INTERNAL MEDICINE

## 2019-11-06 PROCEDURE — 82565 ASSAY OF CREATININE: CPT

## 2019-11-06 PROCEDURE — 71260 CT THORAX DX C+: CPT

## 2019-11-06 RX ADMIN — IOPAMIDOL 100 ML: 755 INJECTION, SOLUTION INTRAVENOUS at 10:08

## 2019-11-06 RX ADMIN — IOHEXOL 50 ML: 240 INJECTION, SOLUTION INTRATHECAL; INTRAVASCULAR; INTRAVENOUS; ORAL at 09:05

## 2019-11-21 DIAGNOSIS — C18.1 CANCER OF APPENDIX (HCC): Primary | ICD-10-CM

## 2019-11-22 ENCOUNTER — OFFICE VISIT (OUTPATIENT)
Dept: ONCOLOGY | Age: 79
End: 2019-11-22
Payer: MEDICARE

## 2019-11-22 ENCOUNTER — HOSPITAL ENCOUNTER (OUTPATIENT)
Dept: INFUSION THERAPY | Age: 79
Discharge: HOME | End: 2019-11-22
Payer: MEDICARE

## 2019-11-22 VITALS
RESPIRATION RATE: 18 BRPM | TEMPERATURE: 98.5 F | HEIGHT: 70 IN | WEIGHT: 207.4 LBS | BODY MASS INDEX: 29.69 KG/M2 | OXYGEN SATURATION: 95 % | SYSTOLIC BLOOD PRESSURE: 125 MMHG | DIASTOLIC BLOOD PRESSURE: 68 MMHG | HEART RATE: 92 BPM

## 2019-11-22 DIAGNOSIS — C18.1 CANCER OF APPENDIX (HCC): Primary | ICD-10-CM

## 2019-11-22 DIAGNOSIS — Z08 ENCOUNTER FOR FOLLOW-UP SURVEILLANCE OF COLON CANCER: ICD-10-CM

## 2019-11-22 DIAGNOSIS — Z90.79 STATUS POST PROSTATECTOMY: ICD-10-CM

## 2019-11-22 DIAGNOSIS — Z90.49 S/P RIGHT COLECTOMY: ICD-10-CM

## 2019-11-22 DIAGNOSIS — C61 PROSTATE CANCER (HCC): ICD-10-CM

## 2019-11-22 DIAGNOSIS — Z85.038 ENCOUNTER FOR FOLLOW-UP SURVEILLANCE OF COLON CANCER: ICD-10-CM

## 2019-11-22 DIAGNOSIS — C18.1 CANCER OF APPENDIX (HCC): ICD-10-CM

## 2019-11-22 LAB
ALBUMIN SERPL-MCNC: 4 G/DL (ref 3.5–5.1)
ALP BLD-CCNC: 56 U/L (ref 38–126)
ALT SERPL-CCNC: 14 U/L (ref 11–66)
AST SERPL-CCNC: 18 U/L (ref 5–40)
BASOPHILS # BLD: 1 %
BASOPHILS ABSOLUTE: 0.1 THOU/MM3 (ref 0–0.1)
BILIRUB SERPL-MCNC: 0.4 MG/DL (ref 0.3–1.2)
BILIRUBIN DIRECT: < 0.2 MG/DL (ref 0–0.3)
BUN, WHOLE BLOOD: 12 MG/DL (ref 8–26)
CEA: 1.5 NG/ML (ref 0–5)
CHLORIDE, WHOLE BLOOD: 102 MEQ/L (ref 98–109)
CREATININE, WHOLE BLOOD: 1.2 MG/DL (ref 0.5–1.2)
EOSINOPHIL # BLD: 3.3 %
EOSINOPHILS ABSOLUTE: 0.2 THOU/MM3 (ref 0–0.4)
ERYTHROCYTE [DISTWIDTH] IN BLOOD BY AUTOMATED COUNT: 14.2 % (ref 11.5–14.5)
ERYTHROCYTE [DISTWIDTH] IN BLOOD BY AUTOMATED COUNT: 49.6 FL (ref 35–45)
GFR, ESTIMATED: 62 ML/MIN/1.73M2
GLUCOSE, WHOLE BLOOD: 92 MG/DL (ref 70–108)
HCT VFR BLD CALC: 41.6 % (ref 42–52)
HEMOGLOBIN: 13.4 GM/DL (ref 14–18)
IMMATURE GRANS (ABS): 0.01 THOU/MM3 (ref 0–0.07)
IMMATURE GRANULOCYTES: 0.2 %
IONIZED CALCIUM, WHOLE BLOOD: 1.15 MMOL/L (ref 1.12–1.32)
LYMPHOCYTES # BLD: 20.8 %
LYMPHOCYTES ABSOLUTE: 1.2 THOU/MM3 (ref 1–4.8)
MCH RBC QN AUTO: 30.8 PG (ref 26–33)
MCHC RBC AUTO-ENTMCNC: 32.2 GM/DL (ref 32.2–35.5)
MCV RBC AUTO: 95.6 FL (ref 80–94)
MONOCYTES # BLD: 14.2 %
MONOCYTES ABSOLUTE: 0.8 THOU/MM3 (ref 0.4–1.3)
NUCLEATED RED BLOOD CELLS: 0 /100 WBC
PLATELET # BLD: 263 THOU/MM3 (ref 130–400)
PMV BLD AUTO: 9.9 FL (ref 9.4–12.4)
POTASSIUM, WHOLE BLOOD: 4.4 MEQ/L (ref 3.5–4.9)
RBC # BLD: 4.35 MILL/MM3 (ref 4.7–6.1)
SEG NEUTROPHILS: 60.5 %
SEGMENTED NEUTROPHILS ABSOLUTE COUNT: 3.5 THOU/MM3 (ref 1.8–7.7)
SODIUM, WHOLE BLOOD: 138 MEQ/L (ref 138–146)
TOTAL CO2, WHOLE BLOOD: 28 MEQ/L (ref 23–33)
TOTAL PROTEIN: 7.8 G/DL (ref 6.1–8)
WBC # BLD: 5.8 THOU/MM3 (ref 4.8–10.8)

## 2019-11-22 PROCEDURE — G8484 FLU IMMUNIZE NO ADMIN: HCPCS | Performed by: INTERNAL MEDICINE

## 2019-11-22 PROCEDURE — 82378 CARCINOEMBRYONIC ANTIGEN: CPT

## 2019-11-22 PROCEDURE — 99214 OFFICE O/P EST MOD 30 MIN: CPT | Performed by: INTERNAL MEDICINE

## 2019-11-22 PROCEDURE — G8417 CALC BMI ABV UP PARAM F/U: HCPCS | Performed by: INTERNAL MEDICINE

## 2019-11-22 PROCEDURE — G8427 DOCREV CUR MEDS BY ELIG CLIN: HCPCS | Performed by: INTERNAL MEDICINE

## 2019-11-22 PROCEDURE — 36415 COLL VENOUS BLD VENIPUNCTURE: CPT

## 2019-11-22 PROCEDURE — 80076 HEPATIC FUNCTION PANEL: CPT

## 2019-11-22 PROCEDURE — 80047 BASIC METABLC PNL IONIZED CA: CPT

## 2019-11-22 PROCEDURE — 99211 OFF/OP EST MAY X REQ PHY/QHP: CPT

## 2019-11-22 PROCEDURE — 4040F PNEUMOC VAC/ADMIN/RCVD: CPT | Performed by: INTERNAL MEDICINE

## 2019-11-22 PROCEDURE — 1123F ACP DISCUSS/DSCN MKR DOCD: CPT | Performed by: INTERNAL MEDICINE

## 2019-11-22 PROCEDURE — 1036F TOBACCO NON-USER: CPT | Performed by: INTERNAL MEDICINE

## 2019-11-22 PROCEDURE — 85025 COMPLETE CBC W/AUTO DIFF WBC: CPT

## 2019-11-22 ASSESSMENT — ENCOUNTER SYMPTOMS
BLOOD IN STOOL: 0
BACK PAIN: 0
WHEEZING: 0
DIARRHEA: 0
ABDOMINAL DISTENTION: 0
CHEST TIGHTNESS: 0
SHORTNESS OF BREATH: 0
TROUBLE SWALLOWING: 0
COUGH: 0
RECTAL PAIN: 0
VOMITING: 0
SORE THROAT: 0
CONSTIPATION: 0
NAUSEA: 0
ABDOMINAL PAIN: 0
COLOR CHANGE: 0
FACIAL SWELLING: 0
EYE DISCHARGE: 0

## 2020-02-04 ENCOUNTER — HOSPITAL ENCOUNTER (OUTPATIENT)
Age: 80
Discharge: HOME | End: 2020-02-04
Payer: MEDICARE

## 2020-02-04 DIAGNOSIS — C61 PROSTATE CANCER (HCC): ICD-10-CM

## 2020-02-04 LAB — PROSTATE SPECIFIC ANTIGEN: < 0.02 NG/ML (ref 0–1)

## 2020-02-04 PROCEDURE — 36415 COLL VENOUS BLD VENIPUNCTURE: CPT

## 2020-02-04 PROCEDURE — 84153 ASSAY OF PSA TOTAL: CPT

## 2020-02-11 ENCOUNTER — OFFICE VISIT (OUTPATIENT)
Dept: UROLOGY | Age: 80
End: 2020-02-11
Payer: MEDICARE

## 2020-02-11 VITALS
HEIGHT: 70 IN | SYSTOLIC BLOOD PRESSURE: 138 MMHG | DIASTOLIC BLOOD PRESSURE: 82 MMHG | BODY MASS INDEX: 30.31 KG/M2 | WEIGHT: 211.7 LBS

## 2020-02-11 LAB
BILIRUBIN URINE: NEGATIVE
BLOOD URINE, POC: NORMAL
CHARACTER, URINE: CLEAR
COLOR, URINE: YELLOW
GLUCOSE URINE: NEGATIVE MG/DL
KETONES, URINE: NEGATIVE
LEUKOCYTE CLUMPS, URINE: NEGATIVE
NITRITE, URINE: NEGATIVE
PH, URINE: 7 (ref 5–9)
PROTEIN, URINE: NEGATIVE MG/DL
SPECIFIC GRAVITY, URINE: 1.02 (ref 1–1.03)
UROBILINOGEN, URINE: 0.2 EU/DL (ref 0–1)

## 2020-02-11 PROCEDURE — G8484 FLU IMMUNIZE NO ADMIN: HCPCS | Performed by: NURSE PRACTITIONER

## 2020-02-11 PROCEDURE — 4040F PNEUMOC VAC/ADMIN/RCVD: CPT | Performed by: NURSE PRACTITIONER

## 2020-02-11 PROCEDURE — 99213 OFFICE O/P EST LOW 20 MIN: CPT | Performed by: NURSE PRACTITIONER

## 2020-02-11 PROCEDURE — 81003 URINALYSIS AUTO W/O SCOPE: CPT | Performed by: NURSE PRACTITIONER

## 2020-02-11 PROCEDURE — 1123F ACP DISCUSS/DSCN MKR DOCD: CPT | Performed by: NURSE PRACTITIONER

## 2020-02-11 PROCEDURE — G8427 DOCREV CUR MEDS BY ELIG CLIN: HCPCS | Performed by: NURSE PRACTITIONER

## 2020-02-11 PROCEDURE — 1036F TOBACCO NON-USER: CPT | Performed by: NURSE PRACTITIONER

## 2020-02-11 PROCEDURE — G8417 CALC BMI ABV UP PARAM F/U: HCPCS | Performed by: NURSE PRACTITIONER

## 2020-02-11 RX ORDER — SILDENAFIL CITRATE 20 MG/1
20 TABLET ORAL DAILY
Qty: 30 TABLET | Refills: 3 | Status: SHIPPED | OUTPATIENT
Start: 2020-02-11 | End: 2020-08-11

## 2020-02-11 NOTE — PROGRESS NOTES
Rik Goyal is a [de-identified] y.o. male was seen in follow up for prostate cancer. He underwent Robotic-Assisted Laparoscopic Radical Prostatectomy (RALRP) and bilateral pelvic lymph node dissection and bilateral nerve wrap 9/4/19 by Dr. Errol Pope. Final pathology resulted [place] 4+4=7 invasive moderately differentiated adenocarcinoma with extraprostatic extension, seminal vesicles and lymph nodes negative for neoplasm. PT3a, pN0    He reports urinary incontinence is continuing to improve. He is down to just a liner. He continues with pelvic floor exercises. Pt also has a hx of appendiceal ca s/p resection. Follows with Dr. Virginie Malave of medical oncology.         Past Medical History:   Diagnosis Date    Diabetes mellitus ([facility] [address]     diet and exercise controlled    History of kidney stones 1996    Hyperlipidemia     Hypertension        Past Surgical History:   Procedure Laterality Date    APPENDECTOMY      COLONOSCOPY  04/2018    Dr. Luly Corral Left 2014    [place], [place]    LITHOTRIPSY      [place] LAP,APPENDECTOMY N/A 5/31/2018    APPENDECTOMY LAPAROSCOPIC performed by Kristie Presley MD at [place] [address], PARTIAL, W/ANAST Right 6/21/2018    ROBOTIC RIGHT COLECTOMY performed by Kristie Presley MD at [address] OFFICE/OUTPT [facility] N/A 10/17/2018    CYSTO, LEFT URETEROSCOPY, LASER LITHOTRIPSY, BASKET RETRIEVAL OF STONES, STENT PLACEMENT performed by Venecia Tyson MD at [facility] 9/4/2019    ROBOT ASSISTED LAPAROSCOPIC RADICAL PROSTATECTOMY performed by Venecia Tyson MD at [facility] 26 Bilateral        Current Outpatient Medications on File Prior to Visit   Medication Sig Dispense Refill    sildenafil (REVATIO) 20 MG tablet Take 1 tablet by mouth daily 30 tablet 3    aspirin 81 MG tablet Take 1 tablet by mouth daily HOLD UNTIL AFTER FOLLOW UP APPT IN THE OFFICE 30 tablet 3    FREESTYLE LITE strip USE 1 STRIP TO CHECK GLUCOSE ONCE DAILY AS DIRECTED  3    Cholecalciferol (VITAMIN D3) 2000 units CAPS Take 1 capsule by mouth daily      hydrochlorothiazide (HYDRODIURIL) 25 MG tablet Take 12.5 mg by mouth daily       losartan (COZAAR) 50 MG tablet Take 25 mg by mouth every morning       simvastatin (ZOCOR) 40 MG tablet Take 20 mg by mouth nightly Takes 1/2 tablet at night       No current facility-administered medications on file prior to visit. Allergies   Allergen Reactions    Lisinopril Other (See Comments)     coughing  Other reaction(s): Cough       Social History     Tobacco Use    Smoking status: Former Smoker     Packs/day: 1.00     Years: 38.00     Pack years: 38.00     Last attempt to quit:      Years since quittin.1    Smokeless tobacco: Never Used   Substance Use Topics    Alcohol use: Yes     Comment: socially beer on weekends    Drug use: No       Family History   Problem Relation Age of Onset    Diabetes Mother    Vinicius Higgins Stroke Mother     Kidney Disease Father     Diabetes Sister     Breast Cancer Sister     Dementia Sister     Heart Disease Brother     Diabetes Brother     Stroke Brother        Review of Systems  No problems with ears, nose or throat. No problems with eyes. No chest pain, shortness of breath, abdominal pain, extremity pain or weakness, and no neurological deficits. No rashes. No swollen glands or lymph nodes.  symptoms per HPI. The remainder of the review of symptoms is negative. Exam  Vitals:    20 0949   BP: 138/82   Weight: 211 lb 11.2 oz (96 kg)   Height: 5' 10\" (1.778 m)     Nursing note and vitals reviewed. Constitutional: Alert and oriented times 3, no acute distress and cooperative to examination with appropriate mood and affect. HENT:   Head:        Normocephalic and atraumatic. Mouth/Throat:         Mucous membranes are normal.   Eyes:         EOM are normal. No scleral icterus. PERRLA.    Neck:        Supple, symmetrical, trachea midline, no adenopathy, thyroid symmetric, not enlarged and no tenderness. Cardiovascular:        Normal rate, regular rhythm, S1 S2 heart sounds. No murmurs, rub, or gallops. Pulses:       Radial pulses are 2+/4 bilateral and equal. Posterior tibialis 2+/4 bilateral and equal  Pulmonary/Chest:      Chest symmetric with normal A/P diameter,  CTA with no wheezes, rales, or rhonchi noted. Normal respiratory rate and rhthym. No use of accessory muscles. Abdominal:         Soft. No tenderness. No rebound, no guarding and no CVA tenderness. Bowel sounds present. Musculoskeletal:         Normal range of motion. No edema or tenderness of lower extremities. Extremities: No cyanosis, clubbing, or edema present. Neurological:        Alert and oriented. No cranial nerve deficit. There are no focalizing motor or sensory deficits. CN II-XII are grossly intact. Skin:       Skin color, texture, turgor normal. No rashes or lesions. Psychiatric:        Normal mood and affect. Labs   Urine dip demonstrates   Results for POC orders placed in visit on 02/11/20   POCT Urinalysis No Micro (Auto)   Result Value Ref Range    Glucose, Ur Negative NEGATIVE mg/dl    Bilirubin Urine Negative     Ketones, Urine Negative NEGATIVE    Specific Gravity, Urine 1.020 1.002 - 1.03    Blood, UA POC Trace-lysed NEGATIVE    pH, Urine 7.00 5.0 - 9.0    Protein, Urine Negative NEGATIVE mg/dl    Urobilinogen, Urine 0.20 0.0 - 1.0 eu/dl    Nitrite, Urine Negative NEGATIVE    Leukocyte Clumps, Urine Negative NEGATIVE    Color, Urine Yellow YELLOW-STR    Character, Urine Clear [id number]       Surgical Pathology  FINAL DIAGNOSIS:  A. Soft tissue, right pelvic region, excisional biopsy:   Benign fibroadipose tissue with infarct.   B. Prostate, radical prostatectomy:   Invasive, moderately differentiated adenocarcinoma.   Abel score = 7 (4+3), grade group = 3.   Maximum tumor size =1.4 cm   Extraprostatic extension, right posterior, 20-30 mm.   Perineural invasion   pT3a, pN0. Seminal vesicles, resections:   Negative for neoplasm. C. Right pelvic lymph nodes, resections:   Negative for neoplasm in 6 out of 6 lymph nodes. D. Left pelvic lymph node negative for neoplasm in, resection:   Negative for neoplasm in one out of one lymph node. Assessment & Plan  Prostate Cancer  Appendiceal ca    Pt had extraprostatic extension on pathology. PSA remains <.02 (undetectable) showing biochemical control of prostate cancer at this time. Decipher pre-op high risk. We have discussed adjuvant radiation therapy and pt would like to hold off for now. We will continue to follow PSAs every 3 months. Continue pelvic floor exercises. Continue sildenafil 20 mg daily. Refill sent.     Follow-up 3 months with PSA prior

## 2020-02-20 ENCOUNTER — HOSPITAL ENCOUNTER (OUTPATIENT)
Age: 80
Discharge: HOME | End: 2020-02-20
Payer: MEDICARE

## 2020-02-20 ENCOUNTER — HOSPITAL ENCOUNTER (OUTPATIENT)
Dept: CT IMAGING | Age: 80
Discharge: HOME | End: 2020-02-20
Payer: MEDICARE

## 2020-02-20 DIAGNOSIS — C18.1 CANCER OF APPENDIX (HCC): ICD-10-CM

## 2020-02-20 LAB
CREAT SERPL-MCNC: 1.3 MG/DL (ref 0.6–1.3)
GFR, ESTIMATED: 56 ML/MIN/1.73M2

## 2020-02-20 PROCEDURE — 82565 ASSAY OF CREATININE: CPT

## 2020-02-20 PROCEDURE — 74177 CT ABD & PELVIS W/CONTRAST: CPT

## 2020-02-20 PROCEDURE — 36415 COLL VENOUS BLD VENIPUNCTURE: CPT

## 2020-02-20 PROCEDURE — 6360000004 HC RX CONTRAST MEDICATION: Performed by: INTERNAL MEDICINE

## 2020-02-20 PROCEDURE — 2709999900 HC NON-CHARGEABLE SUPPLY

## 2020-02-20 RX ADMIN — IOPAMIDOL 100 ML: 755 INJECTION, SOLUTION INTRAVENOUS at 09:42

## 2020-02-20 RX ADMIN — IOHEXOL 50 ML: 240 INJECTION, SOLUTION INTRATHECAL; INTRAVASCULAR; INTRAVENOUS; ORAL at 09:42

## 2020-02-24 ASSESSMENT — ENCOUNTER SYMPTOMS
COUGH: 0
VOMITING: 0
BLOOD IN STOOL: 0
SORE THROAT: 0
FACIAL SWELLING: 0
COLOR CHANGE: 0
WHEEZING: 0
SHORTNESS OF BREATH: 0
EYE DISCHARGE: 0
NAUSEA: 0
BACK PAIN: 0
CHEST TIGHTNESS: 0
CONSTIPATION: 0
ABDOMINAL DISTENTION: 0
DIARRHEA: 0
TROUBLE SWALLOWING: 0
RECTAL PAIN: 0
ABDOMINAL PAIN: 0

## 2020-02-25 ENCOUNTER — OFFICE VISIT (OUTPATIENT)
Dept: ONCOLOGY | Age: 80
End: 2020-02-25
Payer: MEDICARE

## 2020-02-25 ENCOUNTER — HOSPITAL ENCOUNTER (OUTPATIENT)
Dept: INFUSION THERAPY | Age: 80
Discharge: HOME | End: 2020-02-25
Payer: MEDICARE

## 2020-02-25 VITALS
WEIGHT: 213.6 LBS | HEIGHT: 70 IN | DIASTOLIC BLOOD PRESSURE: 70 MMHG | OXYGEN SATURATION: 95 % | TEMPERATURE: 97.9 F | HEART RATE: 93 BPM | BODY MASS INDEX: 30.58 KG/M2 | SYSTOLIC BLOOD PRESSURE: 138 MMHG | RESPIRATION RATE: 16 BRPM

## 2020-02-25 DIAGNOSIS — C18.1 CANCER OF APPENDIX (HCC): ICD-10-CM

## 2020-02-25 LAB
ALBUMIN SERPL-MCNC: 4.1 G/DL (ref 3.5–5.1)
ALP BLD-CCNC: 57 U/L (ref 38–126)
ALT SERPL-CCNC: 17 U/L (ref 11–66)
AST SERPL-CCNC: 20 U/L (ref 5–40)
BASINOPHIL, AUTOMATED: 0 % (ref 0–3)
BILIRUB SERPL-MCNC: 0.3 MG/DL (ref 0.3–1.2)
BILIRUBIN DIRECT: < 0.2 MG/DL (ref 0–0.3)
BUN BLDV-MCNC: 12 MG/DL (ref 7–22)
CEA: 1.5 NG/ML (ref 0–5)
CHLORIDE, WHOLE BLOOD: 100 MEQ/L (ref 98–109)
CO2: 26 MEQ/L (ref 23–33)
CREATININE, WHOLE BLOOD: 1.2 MG/DL (ref 0.5–1.2)
EOSINOPHILS RELATIVE PERCENT: 4 % (ref 0–4)
GFR, ESTIMATED: 62 ML/MIN/1.73M2
GLUCOSE, WHOLE BLOOD: 83 MG/DL (ref 70–108)
HCT VFR BLD CALC: 42.6 % (ref 42–52)
HEMOGLOBIN: 14 GM/DL (ref 14–18)
IONIZED CALCIUM, WHOLE BLOOD: 1.17 MMOL/L (ref 1.12–1.32)
LYMPHOCYTES # BLD: 24 % (ref 15–47)
MCH RBC QN AUTO: 30.2 PG (ref 27–31)
MCHC RBC AUTO-ENTMCNC: 32.9 GM/DL (ref 33–37)
MCV RBC AUTO: 92 FL (ref 80–94)
MONOCYTES: 13 % (ref 0–12)
PDW BLD-RTO: 12.8 % (ref 11.5–14.5)
PLATELET # BLD: 241 THOU/MM3 (ref 130–400)
PMV BLD AUTO: 6.9 FL (ref 7.4–10.4)
POTASSIUM, WHOLE BLOOD: 4.4 MEQ/L (ref 3.5–4.9)
RBC # BLD: 4.64 MILL/MM3 (ref 4.7–6.1)
SEG NEUTROPHILS: 60 % (ref 43–75)
SODIUM, WHOLE BLOOD: 142 MEQ/L (ref 138–146)
TOTAL PROTEIN: 8.2 G/DL (ref 6.1–8)
WBC # BLD: 6.7 THOU/MM3 (ref 4.8–10.8)

## 2020-02-25 PROCEDURE — 84520 ASSAY OF UREA NITROGEN: CPT

## 2020-02-25 PROCEDURE — 80076 HEPATIC FUNCTION PANEL: CPT

## 2020-02-25 PROCEDURE — G8484 FLU IMMUNIZE NO ADMIN: HCPCS | Performed by: INTERNAL MEDICINE

## 2020-02-25 PROCEDURE — G8427 DOCREV CUR MEDS BY ELIG CLIN: HCPCS | Performed by: INTERNAL MEDICINE

## 2020-02-25 PROCEDURE — 1123F ACP DISCUSS/DSCN MKR DOCD: CPT | Performed by: INTERNAL MEDICINE

## 2020-02-25 PROCEDURE — 99214 OFFICE O/P EST MOD 30 MIN: CPT | Performed by: INTERNAL MEDICINE

## 2020-02-25 PROCEDURE — 82378 CARCINOEMBRYONIC ANTIGEN: CPT

## 2020-02-25 PROCEDURE — 36415 COLL VENOUS BLD VENIPUNCTURE: CPT

## 2020-02-25 PROCEDURE — 4040F PNEUMOC VAC/ADMIN/RCVD: CPT | Performed by: INTERNAL MEDICINE

## 2020-02-25 PROCEDURE — 82374 ASSAY BLOOD CARBON DIOXIDE: CPT

## 2020-02-25 PROCEDURE — 85025 COMPLETE CBC W/AUTO DIFF WBC: CPT

## 2020-02-25 PROCEDURE — G8417 CALC BMI ABV UP PARAM F/U: HCPCS | Performed by: INTERNAL MEDICINE

## 2020-02-25 PROCEDURE — 80047 BASIC METABLC PNL IONIZED CA: CPT

## 2020-02-25 PROCEDURE — 1036F TOBACCO NON-USER: CPT | Performed by: INTERNAL MEDICINE

## 2020-02-25 PROCEDURE — 99211 OFF/OP EST MAY X REQ PHY/QHP: CPT

## 2020-02-25 NOTE — PROGRESS NOTES
[facility] PROFESSIONAL SERVS  [facility]  [address] [facility] [address]  [address] [place] [address],[address]  [address] [address]  Dept: [phone]  Dept Fax: [phone]: [phone]     Visit Date: 2/25/2020     Radha Bowman is a [de-identified] y.o. male who presents today for:   Chief Complaint   Patient presents with    Follow-up     LOW GRADE MUCINOUS NEOPLASM OF APPENDIX        HPI:   He has a history of colon polyps.  He underwent colonoscopy demonstrated  few tubular adenoma polyps and enlarged swollen appendiceal orifice.  Therefore he had CT of the abdomen on April 23, 2018 that showed:  1.  There is diffuse dilatation of the appendix which measures 1.8 cm   in diameter. There is no additional evidence for acute appendicitis. There is no periappendiceal edema or appendicolith. 2.  Nonobstructing calculus in the lower pole of the left kidney. 3.  Mild enlargement of the prostate gland with mild diffuse   thickening of the wall of the urinary bladder which may represent   bladder out obstruction. 4.  Calcified granuloma in the left lung base. 5.  Moderate atherosclerosis of the abdominal aorta and iliac   arteries. 6.  Grade 1 anterolisthesis of L4 on L5.   7.  Severe degenerative disc disease at L5-S1. He underwent diagnostic laparoscopy with appendectomy on May 31, 2018. Pathology demonstrated:   Low-grade appendiceal mucinous neoplasm, pTis(LAMN).   Proximal margin focally involved by low-grade mucinous appendiceal neoplasm. Due to positive margin on the June 21, 2018 he underwent right colon resection:   Negative for malignancy.   Pericolonic lymph nodes (12): Negative for malignancy. His postsurgical course was uncomplicated. In July 2019 he was diagnosed with prostate cancer and on September 4, 2019 he underwent  radical prostatectomy with lymph node dissection. Final pathology report showed:   A.  Soft tissue, right pelvic region, excisional biopsy:   Benign fibroadipose tissue with infarct. B. Prostate, radical prostatectomy:   Invasive, moderately differentiated adenocarcinoma.   Cashton score = 7 (4+3), grade group = 3.   Maximum tumor size =1.4 cm   Extraprostatic extension, right posterior, 20-30 mm.   Perineural invasion   pT3a, pN0. Seminal vesicles, resections:   Negative for neoplasm. C. Right pelvic lymph nodes, resections:   Negative for neoplasm in 6 out of 6 lymph nodes. D. Left pelvic lymph node negative for neoplasm in, resection:   Negative for neoplasm in one out of one lymph node. Interim history on 02/25/2020:   Presents to the medical oncology clinic for 3 months follow-up visit. He denies having abdominal pain. No difficulty with bowel movements. His appetite is fine and his weight is stable. Remaining 12 point review of system is negative.     HPI   Past Medical History:   Diagnosis Date    Diabetes mellitus ([address]     diet and exercise controlled    History of kidney stones 1996    Hyperlipidemia     Hypertension       Past Surgical History:   Procedure Laterality Date    APPENDECTOMY      COLONOSCOPY  04/2018    Dr. Jagjit De Paz Left 2014    [place], [place]    LITHOTRIPSY      [place] LAP,APPENDECTOMY N/A 5/31/2018    APPENDECTOMY LAPAROSCOPIC performed by Susanne Greer MD at [place] [address], PARTIAL, W/ANAST Right 6/21/2018    ROBOTIC RIGHT COLECTOMY performed by Susanne Greer MD at [address] [place] [address] OFFICE/OUTPT [address] [place] [address] N/A 10/17/2018    CYSTO, LEFT URETEROSCOPY, LASER LITHOTRIPSY, BASKET RETRIEVAL OF STONES, STENT PLACEMENT performed by Kalen Bond MD at [address] [place] [address] N/A 9/4/2019    ROBOT ASSISTED LAPAROSCOPIC RADICAL PROSTATECTOMY performed by Kalen Bond MD at [facility] 26 Bilateral       Family History   Problem Relation Age of Onset   [facility] Diabetes Mother     Stroke Mother     Kidney Disease Father     Diabetes Sister     Breast Cancer Sister peritoneal dissemination. Radically resected LAMN, even with limited peritoneal spread, carries a low recurrence risk. Today, the patient denies having any complaints/symptoms. Specifically no abdominal pain, no abdominal distention, no change in bowel movement pattern. His labs are within normal limits. His CEA is normal.  There is no evidence of disease recurrence on today's physical examination. His CT of the abdomen performed in November 2019 showed a small low-attenuation collection along the right pelvic sidewall of indeterminate etiology. This measures approximate 2.7 x 1.1 cm and may also represent a small lymphocele or seroma. The patient had a 3 months follow-up CT of the abdomen that showed decrease small low-attenuation collection. Most likely representing a small seroma. 2. Prostate cancer. PT3a, pN0. With extraprostatic extension (pT3a), local control tends to be a problem, particularly in the presence of positive margins. With extraprostatic extension (pT3a), local control tends to be more of a problem, more so with the presence of positive margins. The patient is currently followed by surveillance. His most recent PSA in in February 2020 was below 0.02        Return in about 6 months (around 8/25/2020). Orders Placed:   No orders of the defined types were placed in this encounter. Medications Prescribed:   No orders of the defined types were placed in this encounter.

## 2020-05-06 ENCOUNTER — HOSPITAL ENCOUNTER (OUTPATIENT)
Age: 80
Discharge: HOME | End: 2020-05-06
Payer: MEDICARE

## 2020-05-06 DIAGNOSIS — C61 PROSTATE CANCER (HCC): ICD-10-CM

## 2020-05-06 LAB — PROSTATE SPECIFIC ANTIGEN: < 0.02 NG/ML (ref 0–1)

## 2020-05-06 PROCEDURE — 36415 COLL VENOUS BLD VENIPUNCTURE: CPT

## 2020-05-06 PROCEDURE — 84153 ASSAY OF PSA TOTAL: CPT

## 2020-05-12 ENCOUNTER — VIRTUAL VISIT (OUTPATIENT)
Dept: UROLOGY | Age: 80
End: 2020-05-12
Payer: MEDICARE

## 2020-05-12 PROCEDURE — 99213 OFFICE O/P EST LOW 20 MIN: CPT | Performed by: NURSE PRACTITIONER

## 2020-05-12 PROCEDURE — 4040F PNEUMOC VAC/ADMIN/RCVD: CPT | Performed by: NURSE PRACTITIONER

## 2020-05-12 PROCEDURE — G8427 DOCREV CUR MEDS BY ELIG CLIN: HCPCS | Performed by: NURSE PRACTITIONER

## 2020-05-12 PROCEDURE — 1123F ACP DISCUSS/DSCN MKR DOCD: CPT | Performed by: NURSE PRACTITIONER

## 2020-05-12 ASSESSMENT — ENCOUNTER SYMPTOMS
CHEST TIGHTNESS: 0
DIARRHEA: 0
RHINORRHEA: 0
SHORTNESS OF BREATH: 0
BACK PAIN: 0
ABDOMINAL PAIN: 0
CONSTIPATION: 0

## 2020-05-12 NOTE — PROGRESS NOTES
Historical Provider, MD   hydrochlorothiazide (HYDRODIURIL) 25 MG tablet Take 12.5 mg by mouth daily  Yes Historical Provider, MD   losartan (COZAAR) 50 MG tablet Take 25 mg by mouth every morning  Yes Historical Provider, MD   simvastatin (ZOCOR) 40 MG tablet Take 20 mg by mouth nightly Takes 1/2 tablet at night Yes Historical Provider, MD       Social History     Tobacco Use    Smoking status: Former Smoker     Packs/day: 1.00     Years: 38.00     Pack years: 38.00     Last attempt to quit:      Years since quittin.3    Smokeless tobacco: Never Used   Substance Use Topics    Alcohol use: Yes     Comment: socially beer on weekends    Drug use: No        Allergies   Allergen Reactions    Lisinopril Other (See Comments)     coughing  Other reaction(s): Cough   ,   Past Medical History:   Diagnosis Date    Diabetes mellitus ([address]     diet and exercise controlled    History of kidney stones     Hyperlipidemia     Hypertension    ,   Past Surgical History:   Procedure Laterality Date    APPENDECTOMY      COLONOSCOPY  2018    Dr. Cameron Clarke Left     [place], [place]    LITHOTRIPSY      [place] LAP,APPENDECTOMY N/A 2018    APPENDECTOMY LAPAROSCOPIC performed by Poncho Holloway MD at [place] [address], PARTIAL, W/ANAST Right 2018    ROBOTIC RIGHT COLECTOMY performed by Poncho Holloway MD at [facility] OFFICE/OUTPT [address] [place] [address] N/A 10/17/2018    CYSTO, LEFT URETEROSCOPY, LASER LITHOTRIPSY, BASKET RETRIEVAL OF STONES, STENT PLACEMENT performed by Dalia John MD at [address] [facility] 2019    ROBOT ASSISTED LAPAROSCOPIC RADICAL PROSTATECTOMY performed by Dalia John MD at [facility] Bilateral    ,   Family History   Problem Relation Age of Onset   Beau Ged[place] Diabetes Mother     Stroke Mother     Kidney Disease Father     Diabetes Sister     Breast Cancer Sister     Dementia Sister  Heart Disease Brother     Diabetes Brother     Stroke Brother        PHYSICAL EXAMINATION:    Constitutional: [x] Appears well-developed and well-nourished [x] No apparent distress      [] Abnormal-   Mental status  [x] Alert and awake  [x] Oriented to person/place/time [x]Able to follow commands      Eyes:  EOM    [x]  Normal  [] Abnormal-  Sclera  [x]  Normal  [] Abnormal -         Discharge [x]  None visible  [] Abnormal -    HENT:   [x] Normocephalic, atraumatic. [] Abnormal   [x] Mouth/Throat: Mucous membranes are moist.     External Ears [x] Normal  [] Abnormal-     Neck: [x] No visualized mass     Pulmonary/Chest: [x] Respiratory effort normal.  [x] No visualized signs of difficulty breathing or respiratory distress        [] Abnormal-      Musculoskeletal:   [x] Normal gait with no signs of ataxia         [x] Normal range of motion of neck        [] Abnormal-       Neurological:        [x] No Facial Asymmetry (Cranial nerve 7 motor function) (limited exam to video visit)          [x] No gaze palsy        [] Abnormal-         Skin:        [x] No significant exanthematous lesions or discoloration noted on facial skin         [] Abnormal-            Psychiatric:       [x] Normal Affect [x] No Hallucinations        [] Abnormal-     ASSESSMENT/PLAN:  Prostate cancer  Hx of appendiceal ca    Pt had extraprostatic extension on pathology, pT3a disease. Decipher pre-op resulted high-risk. His PSA remains undetectable at this time showing biochemical control currently. We again discussed the option for referral to radiation oncology now or continued monitoring of PSA. Pt and wife would like to hold off for now and may wish referral for consultation at next visit. Repeat PSA in 3 months. Continue pelvic floor exercises. Continue sildenafil 20 mg daily. F/u in 3 months with PSA a few days prior to appt.     Gwendolyn Kellogg is a 79 y.o. male being evaluated by a Virtual Visit (video visit)

## 2020-07-01 ENCOUNTER — TELEPHONE (OUTPATIENT)
Dept: CARDIOLOGY CLINIC | Age: 80
End: 2020-07-01

## 2020-07-08 ENCOUNTER — OFFICE VISIT (OUTPATIENT)
Dept: CARDIOLOGY CLINIC | Age: 80
End: 2020-07-08
Payer: MEDICARE

## 2020-07-08 VITALS
DIASTOLIC BLOOD PRESSURE: 70 MMHG | SYSTOLIC BLOOD PRESSURE: 142 MMHG | HEART RATE: 85 BPM | HEIGHT: 70 IN | BODY MASS INDEX: 29.84 KG/M2 | WEIGHT: 208.4 LBS

## 2020-07-08 PROCEDURE — 1036F TOBACCO NON-USER: CPT | Performed by: INTERNAL MEDICINE

## 2020-07-08 PROCEDURE — G8427 DOCREV CUR MEDS BY ELIG CLIN: HCPCS | Performed by: INTERNAL MEDICINE

## 2020-07-08 PROCEDURE — 93000 ELECTROCARDIOGRAM COMPLETE: CPT | Performed by: INTERNAL MEDICINE

## 2020-07-08 PROCEDURE — 99204 OFFICE O/P NEW MOD 45 MIN: CPT | Performed by: INTERNAL MEDICINE

## 2020-07-08 PROCEDURE — 4040F PNEUMOC VAC/ADMIN/RCVD: CPT | Performed by: INTERNAL MEDICINE

## 2020-07-08 PROCEDURE — G8417 CALC BMI ABV UP PARAM F/U: HCPCS | Performed by: INTERNAL MEDICINE

## 2020-07-08 PROCEDURE — 1123F ACP DISCUSS/DSCN MKR DOCD: CPT | Performed by: INTERNAL MEDICINE

## 2020-07-08 NOTE — PROGRESS NOTES
[address] [place] [address] [address] [place] [address]  Dept: [phone]  Dept Fax: [phone]  Loc: [phone]    Visit Date: 7/8/2020    Mr. Pau Keys is a 79 y.o. male  who presented for:  Chief Complaint   Patient presents with    New Patient       HPI:   HPI   80 yo M presents for evaluation of Afib/flutter. Hx of DM II, HTN, HLD. He is on Xarelto. No bleeding. He is on HCTZ and ASA. No chest pain, angina, BARROSO, orthopnea, PND, sob at rest, palpitations, LE edema, or syncope. Can do all ADLs without issues. No a/t/d. No major family hx. No MI, no CHF. No hx of valve surgery. No CVA. Current Outpatient Medications:     rivaroxaban (XARELTO) 20 MG TABS tablet, Take 20 mg by mouth Daily with supper, Disp: , Rfl:     sildenafil (REVATIO) 20 MG tablet, Take 1 tablet by mouth daily, Disp: 30 tablet, Rfl: 3    FREESTYLE LITE strip, USE 1 STRIP TO CHECK GLUCOSE ONCE DAILY AS DIRECTED, Disp: , Rfl: 3    Cholecalciferol (VITAMIN D3) 2000 units CAPS, Take 1 capsule by mouth 2 times daily , Disp: , Rfl:     hydrochlorothiazide (HYDRODIURIL) 25 MG tablet, Take 12.5 mg by mouth daily , Disp: , Rfl:     losartan (COZAAR) 50 MG tablet, Take 25 mg by mouth every morning , Disp: , Rfl:     simvastatin (ZOCOR) 40 MG tablet, Take 40 mg by mouth nightly , Disp: , Rfl:     aspirin 81 MG tablet, Take 1 tablet by mouth daily HOLD UNTIL AFTER FOLLOW UP APPT IN THE OFFICE (Patient not taking: Reported on 7/8/2020), Disp: 30 tablet, Rfl: 3    Past Medical History  Esequiel Estrada  has a past medical history of Diabetes mellitus ([facility] History of kidney stones, Hyperlipidemia, and Hypertension. Social History  Esequiel Estrada  reports that he quit smoking about 27 years ago. He has a 38.00 pack-year smoking history. He has never used smokeless tobacco. He reports current alcohol use. He reports that he does not use drugs.     Family History  Esequiel Estrada family history includes Breast Cancer in his sister; Dementia in his sister; Diabetes in his brother, mother, and sister; Heart Disease in his brother; Kidney Disease in his father; Stroke in his brother and mother. There is no family history of bicuspid aortic valve, aneurysms, heart transplant, pacemakers, defibrillators, or sudden cardiac death. Past Surgical History   Past Surgical History:   Procedure Laterality Date    APPENDECTOMY      COLONOSCOPY  04/2018    Dr. Narinder Perales Left 2014    [place], [place]    LITHOTRIPSY      [place] LAP,APPENDECTOMY N/A 5/31/2018    APPENDECTOMY LAPAROSCOPIC performed by Monica Godfrey MD at [facility], PARTIAL, W/ANAST Right 6/21/2018    ROBOTIC RIGHT COLECTOMY performed by Monica Godfrey MD at [address] [facility] OFFICE/OUTPT [address] [place] [address] N/A 10/17/2018    CYSTO, LEFT URETEROSCOPY, LASER LITHOTRIPSY, BASKET RETRIEVAL OF STONES, STENT PLACEMENT performed by Dylan Moreno MD at [address] [place] [address] N/A 9/4/2019    ROBOT ASSISTED LAPAROSCOPIC RADICAL PROSTATECTOMY performed by Dylan Moreno MD at [place] [address] Bilateral        Review of Systems   Constitutional: Negative for chills and fever  HENT: Negative for congestion, sinus pressure, sneezing and sore throat. Eyes: Negative for pain, discharge, redness and itching. Respiratory: Negative for apnea, cough  Gastrointestinal: Negative for blood in stool, constipation, diarrhea   Endocrine: Negative for cold intolerance, heat intolerance, polydipsia. Genitourinary: Negative for dysuria, enuresis, flank pain and hematuria. Musculoskeletal: Negative for arthralgias, joint swelling and neck pain. Neurological: Negative for numbness and headaches. Psychiatric/Behavioral: Negative for agitation, confusion, decreased concentration and dysphoric mood.      Objective:     BP (!) 154/78   Pulse 85   Ht 5' 10\" (1.778 m)   Wt 208 lb 6.4 oz 02/25/2020    BILITOT 0.3 02/25/2020    BILIDIR <0.2 02/25/2020    LABALBU 4.1 02/25/2020       Lab Results   Component Value Date    MG 1.9 06/22/2018       No results found for: INR, PROTIME      No results found for: LABA1C    No results found for: TRIG, HDL, LDLCALC, LDLDIRECT, LABVLDL    No results found for: TSH      Testing Reviewed:      I have individually reviewed the cardiac test below:    ECHO: No results found for this or any previous visit. Assessment/Plan   Atrial flutter/fib, [id number] = 3  ECG shows flutter with variable block, he has no symptoms. Check TTE, if EF dropped, consider DCCV, otherwise he is rate controlled and completely asymptomatic. Continue Xarelto. Discussed diet/exercise/BP/weight loss/health lifestyle choices/lipids; the patient understands the goals and will try to comply.     Disposition:  6 months         Electronically signed by Blue Livingston MD   7/8/2020 at 3:15 PM EDT

## 2020-07-15 ENCOUNTER — HOSPITAL ENCOUNTER (OUTPATIENT)
Dept: NON INVASIVE DIAGNOSTICS | Age: 80
Discharge: HOME | End: 2020-07-15
Payer: MEDICARE

## 2020-07-15 LAB
LV EF: 63 %
LVEF MODALITY: NORMAL

## 2020-07-15 PROCEDURE — 93306 TTE W/DOPPLER COMPLETE: CPT

## 2020-08-04 ENCOUNTER — HOSPITAL ENCOUNTER (OUTPATIENT)
Age: 80
Discharge: HOME | End: 2020-08-04
Payer: MEDICARE

## 2020-08-04 DIAGNOSIS — C61 PROSTATE CANCER (HCC): ICD-10-CM

## 2020-08-04 LAB — PROSTATE SPECIFIC ANTIGEN: < 0.02 NG/ML (ref 0–1)

## 2020-08-04 PROCEDURE — 36415 COLL VENOUS BLD VENIPUNCTURE: CPT

## 2020-08-04 PROCEDURE — 84153 ASSAY OF PSA TOTAL: CPT

## 2020-08-11 ENCOUNTER — OFFICE VISIT (OUTPATIENT)
Dept: UROLOGY | Age: 80
End: 2020-08-11
Payer: MEDICARE

## 2020-08-11 VITALS — WEIGHT: 209.1 LBS | BODY MASS INDEX: 29.94 KG/M2 | HEIGHT: 70 IN | TEMPERATURE: 97.7 F

## 2020-08-11 LAB
BACTERIA: NORMAL
BILIRUBIN URINE: NEGATIVE
BILIRUBIN URINE: NEGATIVE
BLOOD URINE, POC: NORMAL
BLOOD, URINE: NEGATIVE
CASTS: NORMAL /LPF
CASTS: NORMAL /LPF
CHARACTER, URINE: CLEAR
CHARACTER, URINE: CLEAR
COLOR, URINE: YELLOW
COLOR: YELLOW
CRYSTALS: NORMAL
EPITHELIAL CELLS, UA: NORMAL /HPF
GLUCOSE URINE: NEGATIVE MG/DL
GLUCOSE, URINE: NEGATIVE MG/DL
KETONES, URINE: NEGATIVE
KETONES, URINE: NEGATIVE
LEUKOCYTE CLUMPS, URINE: NEGATIVE
LEUKOCYTE ESTERASE, URINE: NEGATIVE
MISCELLANEOUS LAB TEST RESULT: NORMAL
NITRITE, URINE: NEGATIVE
NITRITE, URINE: NEGATIVE
PH UA: 7 (ref 5–9)
PH, URINE: 7 (ref 5–9)
PROTEIN UA: NEGATIVE MG/DL
PROTEIN, URINE: NEGATIVE MG/DL
RBC URINE: NORMAL /HPF
RENAL EPITHELIAL, UA: NORMAL
SPECIFIC GRAVITY UA: 1.01 (ref 1–1.03)
SPECIFIC GRAVITY, URINE: 1.01 (ref 1–1.03)
UROBILINOGEN, URINE: 0.2 EU/DL (ref 0–1)
UROBILINOGEN, URINE: 0.2 EU/DL (ref 0–1)
WBC UA: NORMAL /HPF
YEAST: NORMAL

## 2020-08-11 PROCEDURE — G8417 CALC BMI ABV UP PARAM F/U: HCPCS | Performed by: NURSE PRACTITIONER

## 2020-08-11 PROCEDURE — 99213 OFFICE O/P EST LOW 20 MIN: CPT | Performed by: NURSE PRACTITIONER

## 2020-08-11 PROCEDURE — 1036F TOBACCO NON-USER: CPT | Performed by: NURSE PRACTITIONER

## 2020-08-11 PROCEDURE — 81003 URINALYSIS AUTO W/O SCOPE: CPT | Performed by: NURSE PRACTITIONER

## 2020-08-11 PROCEDURE — 4040F PNEUMOC VAC/ADMIN/RCVD: CPT | Performed by: NURSE PRACTITIONER

## 2020-08-11 PROCEDURE — 1123F ACP DISCUSS/DSCN MKR DOCD: CPT | Performed by: NURSE PRACTITIONER

## 2020-08-11 PROCEDURE — G8427 DOCREV CUR MEDS BY ELIG CLIN: HCPCS | Performed by: NURSE PRACTITIONER

## 2020-08-11 RX ORDER — SILDENAFIL 100 MG/1
100 TABLET, FILM COATED ORAL PRN
Qty: 30 TABLET | Refills: 1 | Status: SHIPPED | OUTPATIENT
Start: 2020-08-11

## 2020-08-11 NOTE — PROGRESS NOTES
[address] [facility],[address] [address]  Dept: [phone]  Loc: [phone]    Visit Date: 8/11/2020        HPI:     Harvie Hammans is a 79 y.o. male who presents today for:  Chief Complaint   Patient presents with    3 Month Follow-Up    Prostate Cancer       HPI   Pt seen in follow up for prostate cancer. He underwent Robotic-Assisted Laparoscopic Radical Prostatectomy (RALRP) and bilateral pelvic lymph node dissection and bilateral nerve wrap 9/4/19 by Dr. Zenobia Meza. Roly Booker pathology resulted [place] 4+3=7 invasive moderately differentiated adenocarcinoma with extraprostatic extension, seminal vesicles and lymph nodes negative for neoplasm.  Grade group 3. PT3a, pN0. Preoperative decipher testing high risk.     He reports urinary incontinence is continuing to improve.  He is down to just a [employer] continues with his pelvic floor exercises.      Pt also has a hx of appendiceal ca s/p resection.  Follows with Dr. Wilton Mariscal of medical oncology.      Current Outpatient Medications   Medication Sig Dispense Refill    rivaroxaban (XARELTO) 20 MG TABS tablet Take 20 mg by mouth Daily with supper      sildenafil (REVATIO) 20 MG tablet Take 1 tablet by mouth daily 30 tablet 3    aspirin 81 MG tablet Take 1 tablet by mouth daily HOLD UNTIL AFTER FOLLOW UP APPT IN THE OFFICE (Patient not taking: Reported on 7/8/2020) 30 tablet 3    FREESTYLE LITE strip USE 1 STRIP TO CHECK GLUCOSE ONCE DAILY AS DIRECTED  3    Cholecalciferol (VITAMIN D3) 2000 units CAPS Take 1 capsule by mouth 2 times daily       hydrochlorothiazide (HYDRODIURIL) 25 MG tablet Take 12.5 mg by mouth daily       losartan (COZAAR) 50 MG tablet Take 25 mg by mouth every morning       simvastatin (ZOCOR) 40 MG tablet Take 40 mg by mouth nightly        No current facility-administered medications for this visit.         Past Medical History  Zeinab Parks  has a past medical history of Atrial flutter ([facility] [address] Diabetes mellitus ([facility] History of kidney stones, Hyperlipidemia, and Hypertension. Past Surgical History  The patient  has a past surgical history that includes Lithotripsy; Retinal detachment surgery (Bilateral); Colonoscopy (04/2018); Inguinal hernia repair (Left, 2014); pr lap,appendectomy (N/A, 5/31/2018); pr lap,surg,colectomy, partial, w/anast (Right, 6/21/2018); Appendectomy; pr office/outpt visit,procedure only (N/A, 10/17/2018); and Prostatectomy (N/A, 9/4/2019). Family History  This patient's family history includes Breast Cancer in his sister; Dementia in his sister; Diabetes in his brother, mother, and sister; Heart Disease in his brother; Kidney Disease in his father; Stroke in his brother and mother. Social History  Flower Simmons  reports that he quit smoking about 27 years ago. He has a 38.00 pack-year smoking history. He has never used smokeless tobacco. He reports current alcohol use. He reports that he does not use drugs.       Subjective:      Review of Systems    Objective:   Temp 97.7 °F (36.5 °C)   Ht 5' 10\" (1.778 m)   Wt 209 lb 1.6 oz (94.8 kg)   BMI 30.00 kg/m²     Physical Exam    POC  Results for POC orders placed in visit on 08/11/20   POCT Urinalysis No Micro (Auto)   Result Value Ref Range    Glucose, Ur Negative NEGATIVE mg/dl    Bilirubin Urine Negative     Ketones, Urine Negative NEGATIVE    Specific Gravity, Urine 1.015 1.002 - 1.03    Blood, UA POC Trace-lysed NEGATIVE    pH, Urine 7.00 5.0 - 9.0    Protein, Urine Negative NEGATIVE mg/dl    Urobilinogen, Urine 0.20 0.0 - 1.0 eu/dl    Nitrite, Urine Negative NEGATIVE    Leukocyte Clumps, Urine Negative NEGATIVE    Color, Urine Yellow YELLOW-STR    Character, Urine Clear [id number]         Patients recent PSA values are as follows  Lab Results   Component Value Date    PSA <0.02 08/04/2020    PSA <0.02 05/06/2020    PSA <0.02 02/04/2020        Recent BUN/Creatinine:  Lab Results   Component Value Date    BUN 12 02/25/2020    CREATININE 1.2 02/25/2020    CREATININE 1.3 02/20/2020     FINAL DIAGNOSIS:   A. Soft tissue, right pelvic region, excisional biopsy:    Benign fibroadipose tissue with infarct. B. Prostate, radical prostatectomy:    Invasive, moderately differentiated adenocarcinoma.    [place] score = 7 (4+3), grade group = 3.    Maximum tumor size =1.4 cm    Extraprostatic extension, right posterior, 20-30 mm.    Perineural invasion    pT3a, pN0. Seminal vesicles, resections:    Negative for neoplasm. C. Right pelvic lymph nodes, resections:    Negative for neoplasm in 6 out of 6 lymph nodes. D. Left pelvic lymph node negative for neoplasm in, resection:    Negative for neoplasm in one out of one lymph node. Assessment:   Prostate cancer  Erectile dysfunction  Hx of appendiceal ca    Plan:     Pt is doing well. PSA remains undetectable indicating control of prostate cancer at this time. Pt has extraprostatic extension and high risk decipher score preop. He would prefer to wait on radiation oncology referral until rising PSA. Trial sildenafil 100 mg PO PRN three times weekly. Provided with new [email or web address] coupon. F/u in 6 months with PSA prior to appt.

## 2020-08-24 ASSESSMENT — ENCOUNTER SYMPTOMS
ABDOMINAL PAIN: 0
WHEEZING: 0
NAUSEA: 0
COLOR CHANGE: 0
SORE THROAT: 0
CHEST TIGHTNESS: 0
CONSTIPATION: 0
EYE DISCHARGE: 0
COUGH: 0
BLOOD IN STOOL: 0
VOMITING: 0
RECTAL PAIN: 0
ABDOMINAL DISTENTION: 0
SHORTNESS OF BREATH: 0
BACK PAIN: 0
TROUBLE SWALLOWING: 0
FACIAL SWELLING: 0
DIARRHEA: 0

## 2020-08-25 ENCOUNTER — HOSPITAL ENCOUNTER (OUTPATIENT)
Dept: INFUSION THERAPY | Age: 80
Discharge: HOME | End: 2020-08-25
Payer: MEDICARE

## 2020-08-25 ENCOUNTER — OFFICE VISIT (OUTPATIENT)
Dept: ONCOLOGY | Age: 80
End: 2020-08-25
Payer: MEDICARE

## 2020-08-25 VITALS
WEIGHT: 208.6 LBS | SYSTOLIC BLOOD PRESSURE: 147 MMHG | DIASTOLIC BLOOD PRESSURE: 72 MMHG | HEART RATE: 86 BPM | HEIGHT: 70 IN | OXYGEN SATURATION: 95 % | TEMPERATURE: 98 F | BODY MASS INDEX: 29.86 KG/M2 | RESPIRATION RATE: 18 BRPM

## 2020-08-25 DIAGNOSIS — C18.1 CANCER OF APPENDIX (HCC): ICD-10-CM

## 2020-08-25 LAB
ABSOLUTE IMMATURE GRANULOCYTE: 0.01 THOU/MM3 (ref 0–0.07)
ALBUMIN SERPL-MCNC: 3.9 G/DL (ref 3.5–5.1)
ALP BLD-CCNC: 53 U/L (ref 38–126)
ALT SERPL-CCNC: 16 U/L (ref 11–66)
AST SERPL-CCNC: 19 U/L (ref 5–40)
BASINOPHIL, AUTOMATED: 1 % (ref 0–3)
BASOPHILS ABSOLUTE: 0.1 THOU/MM3 (ref 0–0.1)
BILIRUB SERPL-MCNC: 0.4 MG/DL (ref 0.3–1.2)
BILIRUBIN DIRECT: < 0.2 MG/DL (ref 0–0.3)
BUN BLDV-MCNC: 14 MG/DL (ref 7–22)
CEA: 1.3 NG/ML (ref 0–5)
CHLORIDE, WHOLE BLOOD: 103 MEQ/L (ref 98–109)
CO2: 25 MEQ/L (ref 23–33)
CREATININE, WHOLE BLOOD: 1.2 MG/DL (ref 0.5–1.2)
EOSINOPHILS ABSOLUTE: 0.2 THOU/MM3 (ref 0–0.4)
EOSINOPHILS RELATIVE PERCENT: 3 % (ref 0–4)
GFR, ESTIMATED: 62 ML/MIN/1.73M2
GLUCOSE, WHOLE BLOOD: 89 MG/DL (ref 70–108)
HCT VFR BLD CALC: 42 % (ref 42–52)
HEMOGLOBIN: 13.4 GM/DL (ref 14–18)
IMMATURE GRANULOCYTES: 0 %
IONIZED CALCIUM, WHOLE BLOOD: 1.16 MMOL/L (ref 1.12–1.32)
LYMPHOCYTES # BLD: 24 % (ref 15–47)
LYMPHOCYTES ABSOLUTE: 1.3 THOU/MM3 (ref 1–4.8)
MCH RBC QN AUTO: 29.6 PG (ref 26–33)
MCHC RBC AUTO-ENTMCNC: 31.9 GM/DL (ref 32.2–35.5)
MCV RBC AUTO: 93 FL (ref 80–94)
MONOCYTES ABSOLUTE: 0.9 THOU/MM3 (ref 0.4–1.3)
MONOCYTES: 16 % (ref 0–12)
PDW BLD-RTO: 13.9 % (ref 11.5–14.5)
PLATELET # BLD: 223 THOU/MM3 (ref 130–400)
PMV BLD AUTO: 9.3 FL (ref 9.4–12.4)
POTASSIUM, WHOLE BLOOD: 4.2 MEQ/L (ref 3.5–4.9)
RBC # BLD: 4.53 MILL/MM3 (ref 4.7–6.1)
SEG NEUTROPHILS: 56 % (ref 43–75)
SEGMENTED NEUTROPHILS ABSOLUTE COUNT: 3 THOU/MM3 (ref 1.8–7.7)
SODIUM, WHOLE BLOOD: 138 MEQ/L (ref 138–146)
TOTAL PROTEIN: 7.3 G/DL (ref 6.1–8)
WBC # BLD: 5.4 THOU/MM3 (ref 4.8–10.8)

## 2020-08-25 PROCEDURE — 82378 CARCINOEMBRYONIC ANTIGEN: CPT

## 2020-08-25 PROCEDURE — 36415 COLL VENOUS BLD VENIPUNCTURE: CPT

## 2020-08-25 PROCEDURE — 80047 BASIC METABLC PNL IONIZED CA: CPT

## 2020-08-25 PROCEDURE — 4040F PNEUMOC VAC/ADMIN/RCVD: CPT | Performed by: INTERNAL MEDICINE

## 2020-08-25 PROCEDURE — 80076 HEPATIC FUNCTION PANEL: CPT

## 2020-08-25 PROCEDURE — 1036F TOBACCO NON-USER: CPT | Performed by: INTERNAL MEDICINE

## 2020-08-25 PROCEDURE — G8427 DOCREV CUR MEDS BY ELIG CLIN: HCPCS | Performed by: INTERNAL MEDICINE

## 2020-08-25 PROCEDURE — 82374 ASSAY BLOOD CARBON DIOXIDE: CPT

## 2020-08-25 PROCEDURE — G8417 CALC BMI ABV UP PARAM F/U: HCPCS | Performed by: INTERNAL MEDICINE

## 2020-08-25 PROCEDURE — 99214 OFFICE O/P EST MOD 30 MIN: CPT | Performed by: INTERNAL MEDICINE

## 2020-08-25 PROCEDURE — 85025 COMPLETE CBC W/AUTO DIFF WBC: CPT

## 2020-08-25 PROCEDURE — 1123F ACP DISCUSS/DSCN MKR DOCD: CPT | Performed by: INTERNAL MEDICINE

## 2020-08-25 PROCEDURE — 99211 OFF/OP EST MAY X REQ PHY/QHP: CPT

## 2020-08-25 PROCEDURE — 84520 ASSAY OF UREA NITROGEN: CPT

## 2020-08-25 NOTE — PROGRESS NOTES
[address] [place] [address] PROFESSIONAL SERVS  [facility]  [address] [facility] [address]  [address] [facility] 83,[address]  [address] [address]  Dept: [phone]  Dept Fax: [phone]: [phone]     Visit Date: 8/25/2020     Burnetta Harada is a 79 y.o. male who presents today for:   Chief Complaint   Patient presents with    Follow-up     low grade mucinous neoplasm of appendix        HPI:   This is an 79-year-old patient with a history of  low-grade appendiceal mucinous neoplasm, pTis(LAMN). He underwent colonoscopy demonstrated  few tubular adenoma polyps and enlarged swollen appendiceal orifice.  Therefore he had CT of the abdomen on April 23, 2018 that showed:  1. Channie Cluster is diffuse dilatation of the appendix which measures 1.8 cm   in diameter. There is no additional evidence for acute appendicitis. There is no periappendiceal edema or appendicolith. 2.  Nonobstructing calculus in the lower pole of the left kidney. 3.  Mild enlargement of the prostate gland with mild diffuse   thickening of the wall of the urinary bladder which may represent   bladder out obstruction. 4.  Calcified granuloma in the left lung base. 5.  Moderate atherosclerosis of the abdominal aorta and iliac   arteries. 6.  Grade 1 anterolisthesis of L4 on L5.   7.  Severe degenerative disc disease at L5-S1. He underwent diagnostic laparoscopy with appendectomy on May 31, 2018. Pathology demonstrated:   Low-grade appendiceal mucinous neoplasm, pTis(LAMN).   Proximal margin focally involved by low-grade mucinous appendiceal neoplasm. Due to positive margin on the June 21, 2018 he underwent right colon resection:   Negative for malignancy.   Pericolonic lymph nodes (12): Negative for malignancy. His postsurgical course was uncomplicated. In July 2019 he was diagnosed with prostate cancer and on September 4, 2019 he underwent  radical prostatectomy with lymph node dissection. Final pathology report showed:   A.  Soft tissue, right pelvic region, excisional biopsy:   Benign fibroadipose tissue with infarct. B. Prostate, radical prostatectomy:   Invasive, moderately differentiated adenocarcinoma.   Venango score = 7 (4+3), grade group = 3.   Maximum tumor size =1.4 cm   Extraprostatic extension, right posterior, 20-30 mm.   Perineural invasion   pT3a, pN0. Seminal vesicles, resections:   Negative for neoplasm. C. Right pelvic lymph nodes, resections:   Negative for neoplasm in 6 out of 6 lymph nodes. D. Left pelvic lymph node negative for neoplasm in, resection:   Negative for neoplasm in one out of one lymph node. Interim history on 08/25/2020:   Presents to the medical oncology clinic for 6 months follow-up visit. He denies having abdominal pain. No difficulty with bowel movements. His appetite is fine and his weight is stable. Remaining 12 point review of system is negative.     HPI   Past Medical History:   Diagnosis Date    Atrial flutter ([address]     Diabetes mellitus ([address]     diet and exercise controlled    History of kidney stones 1996    Hyperlipidemia     Hypertension       Past Surgical History:   Procedure Laterality Date    APPENDECTOMY      COLONOSCOPY  04/2018    Dr. Annia Coughlin Left 2014    [place], [place]    LITHOTRIPSY      PA LAP,APPENDECTOMY N/A 5/31/2018    APPENDECTOMY LAPAROSCOPIC performed by Cale Hopkins MD at [facility], PARTIAL, W/ANAST Right 6/21/2018    ROBOTIC RIGHT COLECTOMY performed by Cale Hopkins MD at [address] [place] [address] OFFICE/OUTPT [facility] N/A 10/17/2018    CYSTO, LEFT URETEROSCOPY, LASER LITHOTRIPSY, BASKET RETRIEVAL OF STONES, STENT PLACEMENT performed by Jason Henao MD at [facility] 9/4/2019    ROBOT ASSISTED LAPAROSCOPIC RADICAL PROSTATECTOMY performed by Jason Henao MD at [facility] 26 Bilateral       Family History   Problem Relation Age of Onset    Diabetes of Systems   Constitutional: Negative for activity change, appetite change, fatigue and fever. HENT: Negative for congestion, dental problem, facial swelling, hearing loss, mouth sores, nosebleeds, sore throat, tinnitus and trouble swallowing. Eyes: Negative for discharge and visual disturbance. Respiratory: Negative for cough, chest tightness, shortness of breath and wheezing. Cardiovascular: Negative for chest pain, palpitations and leg swelling. Gastrointestinal: Negative for abdominal distention, abdominal pain, blood in stool, constipation, diarrhea, nausea, rectal pain and vomiting. Endocrine: Negative for cold intolerance, polydipsia and polyuria. Genitourinary: Negative for decreased urine volume, difficulty urinating, dysuria, flank pain, hematuria and urgency. Musculoskeletal: Negative for arthralgias, back pain, gait problem, joint swelling, myalgias and neck stiffness. Skin: Negative for color change, rash and wound. Neurological: Negative for dizziness, tremors, seizures, speech difficulty, weakness, light-headedness, numbness and headaches. Hematological: Negative for adenopathy. Does not bruise/bleed easily. Psychiatric/Behavioral: Negative for confusion and sleep disturbance. The patient is not nervous/anxious. Objective:   Physical Exam   Constitutional: He is oriented to person, place, and time. He appears well-developed and well-nourished. No distress. HENT:   Head: Normocephalic. Mouth/Throat: Oropharynx is clear and moist. No oropharyngeal exudate. Eyes: Pupils are equal, round, and reactive to light. EOM are normal. Right eye exhibits no discharge. Left eye exhibits no discharge. No scleral icterus. Neck: Normal range of motion. Neck supple. No JVD present. No tracheal deviation present. No thyromegaly present. Cardiovascular: Normal rate and normal heart sounds. Exam reveals no gallop and no friction rub. No murmur heard.   Pulmonary/Chest: Effort appear unchanged from December 2018. Recommend continued follow-up.       2. Indeterminate 1.1 x 1.2 cm mildly prominent lymph node within the subcarinal region. This is unchanged from prior examination. Recommend continued follow-up.         CT of the abdomen on November 6, 2019 showed:  1. 4.2 x 3.7 cm fluid attenuation collection along the left lateral pelvic sidewall. This is adjacent to the iliac vessels. This may represent a postoperative lymphocele or seroma. Correlate clinically.       2. Mild diffuse circumferential bladder wall thickening of indeterminate etiology. This could be related to cystitis. Correlate clinically.       3. Postoperative changes as described above. Additional incidental findings are otherwise as detailed above.       4. There is a small low-attenuation collection along the right pelvic sidewall indeterminate etiology. This is demonstrated on axial images 78 and 79. This measures approximate 2.7 x 1.1 cm and may also represent a small lymphocele or seroma. However 3    month follow-up CT imaging is recommended to rule out a solid mass.         CT of the abdomen on February 20, 2020 showed:  1. There is redemonstration of a fluid collection along the left lateral pelvic sidewall. This has decreased in size since prior exam and could be related to a postoperative lymphocele or seroma. The previously seen right-sided pelvic fluid collection    has resolved since the previous study. 2. Postsurgical changes involving the colon as well as the prostate gland. No evidence of recurrent disease. 3. The liver appears slightly hypoattenuated which could be related to fatty infiltration.             Assessment/Plan: 1. Low-grade appendiceal mucinous neoplasm, pTis(LAMN). Appendiceal mucinous neoplasms are a heterogeneous group of tumors. Treatment is based on stage and histology.  Low-grade tumors are treated surgically with resection of the primary site in early stage disease, no further treatment is required after surgery. Appendiceal mucinous neoplasms sometimes present with peritoneal dissemination, however the patient did not have evidence of peritoneal dissemination. Radically resected LAMN, even with limited peritoneal spread, carries a low recurrence risk. Today, the patient denies having any complaints/symptoms. Specifically no abdominal pain, no abdominal distention, no change in bowel movement pattern. His labs are within normal limits. His CEA is normal.  There is no evidence of disease recurrence on today's physical examination. His CT of the abdomen performed in November 2019 showed a small low-attenuation collection along the right pelvic sidewall of indeterminate etiology. This measures approximate 2.7 x 1.1 cm and may also represent a small lymphocele or seroma. 3 months follow-up CT of the abdomen  showed decrease small low-attenuation collection. Most likely representing a small seroma. His CEA today is 1.3  2. Prostate cancer. PT3a, pN0. With extraprostatic extension (pT3a), local control tends to be a problem, particularly in the presence of positive margins. With extraprostatic extension (pT3a), local control tends to be more of a problem, more so with the presence of positive margins. The patient is currently followed by surveillance. His most recent PSA in August 2020 was below 0.02        Return in about 6 months (around 2/25/2021).      Orders Placed:   Orders Placed This Encounter   Procedures    CT ABDOMEN PELVIS W IV CONTRAST Additional Contrast? Oral     Standing Status:   Future     Number of Occurrences:   1     Standing Expiration Date:   8/25/2021     Order Specific Question:   Additional Contrast?     Answer:   Oral    CBC Auto Differential     Standing Status:   Future     Number of Occurrences:   1     Standing Expiration Date:   8/24/2021    POC PANEL BMP W/IOCA     Standing Status:   Future     Number of Occurrences:   1     Standing Expiration Date:   8/24/2021    Hepatic Function Panel     Standing Status:   Future     Number of Occurrences:   1     Standing Expiration Date:   8/24/2021    CEA     Standing Status:   Future     Number of Occurrences:   1     Standing Expiration Date:   8/24/2021      Addendum on September 2, 2020:  CT of the abdomen and pelvis performed today showed no evidence of metastatic or recurrent disease in the abdomen or pelvis

## 2020-09-02 ENCOUNTER — HOSPITAL ENCOUNTER (OUTPATIENT)
Dept: CT IMAGING | Age: 80
Discharge: HOME | End: 2020-09-02
Payer: MEDICARE

## 2020-09-02 PROCEDURE — 6360000004 HC RX CONTRAST MEDICATION: Performed by: INTERNAL MEDICINE

## 2020-09-02 PROCEDURE — 74177 CT ABD & PELVIS W/CONTRAST: CPT

## 2020-09-02 RX ADMIN — IOHEXOL 50 ML: 240 INJECTION, SOLUTION INTRATHECAL; INTRAVASCULAR; INTRAVENOUS; ORAL at 12:16

## 2020-09-02 RX ADMIN — IOPAMIDOL 100 ML: 755 INJECTION, SOLUTION INTRAVENOUS at 12:15

## 2021-01-14 ENCOUNTER — OFFICE VISIT (OUTPATIENT)
Dept: CARDIOLOGY CLINIC | Age: 81
End: 2021-01-14
Payer: MEDICARE

## 2021-01-14 VITALS
BODY MASS INDEX: 30.78 KG/M2 | HEIGHT: 70 IN | SYSTOLIC BLOOD PRESSURE: 138 MMHG | HEART RATE: 74 BPM | DIASTOLIC BLOOD PRESSURE: 80 MMHG | WEIGHT: 215 LBS

## 2021-01-14 DIAGNOSIS — I10 ESSENTIAL HYPERTENSION: ICD-10-CM

## 2021-01-14 DIAGNOSIS — I48.91 ATRIAL FIBRILLATION, UNSPECIFIED TYPE (HCC): Primary | ICD-10-CM

## 2021-01-14 PROCEDURE — 1123F ACP DISCUSS/DSCN MKR DOCD: CPT | Performed by: INTERNAL MEDICINE

## 2021-01-14 PROCEDURE — G8417 CALC BMI ABV UP PARAM F/U: HCPCS | Performed by: INTERNAL MEDICINE

## 2021-01-14 PROCEDURE — 1036F TOBACCO NON-USER: CPT | Performed by: INTERNAL MEDICINE

## 2021-01-14 PROCEDURE — 99213 OFFICE O/P EST LOW 20 MIN: CPT | Performed by: INTERNAL MEDICINE

## 2021-01-14 PROCEDURE — G8427 DOCREV CUR MEDS BY ELIG CLIN: HCPCS | Performed by: INTERNAL MEDICINE

## 2021-01-14 PROCEDURE — 4040F PNEUMOC VAC/ADMIN/RCVD: CPT | Performed by: INTERNAL MEDICINE

## 2021-01-14 PROCEDURE — G8484 FLU IMMUNIZE NO ADMIN: HCPCS | Performed by: INTERNAL MEDICINE

## 2021-01-14 NOTE — PROGRESS NOTES
[address] [facility] [address] [facility] [address]  Dept: [phone]  Dept Fax: [phone]  Loc: [phone]    Visit Date: 1/14/2021    Mr. Parag Andrea is a 79 y.o. male  who presented for:  Chief Complaint   Patient presents with    6 Month Follow-Up       HPI:   HPI   80 yo M hx of Afib/flutter on Xarelto who presents for follow-up. 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  No chest pain, angina, BARROSO, orthopnea, PND, sob at rest, palpitations, LE edema, or syncope. No CVA. Can do all ADLs. Current Outpatient Medications:     sildenafil (VIAGRA) 100 MG tablet, Take 1 tablet by mouth as needed for Erectile Dysfunction (Take 3 times weekly prn for erectile dysfunction.), Disp: 30 tablet, Rfl: 1    rivaroxaban (XARELTO) 20 MG TABS tablet, Take 20 mg by mouth Daily with supper, Disp: , Rfl:     FREESTYLE LITE strip, USE 1 STRIP TO CHECK GLUCOSE ONCE DAILY AS DIRECTED, Disp: , Rfl: 3    Cholecalciferol (VITAMIN D3) 2000 units CAPS, Take 1 capsule by mouth 2 times daily , Disp: , Rfl:     hydrochlorothiazide (HYDRODIURIL) 25 MG tablet, Take 12.5 mg by mouth daily , Disp: , Rfl:     losartan (COZAAR) 50 MG tablet, Take 25 mg by mouth every morning , Disp: , Rfl:     simvastatin (ZOCOR) 40 MG tablet, Take 40 mg by mouth nightly , Disp: , Rfl:     Past Medical History  The [place]  has a past medical history of Atrial flutter ([facility] [address] Diabetes mellitus ([facility] [address] History of kidney stones, Hyperlipidemia, and Hypertension. Social History  The alice  reports that he quit smoking about 28 years ago. He has a 38.00 pack-year smoking history. He has never used smokeless tobacco. He reports current alcohol use. He reports that he does not use drugs. Family History  [facility] family history includes Breast Cancer in his sister; Dementia in his sister; Diabetes in his brother, mother, and sister;  Heart Disease in his brother; Kidney Disease in his father; Stroke in his brother and mother. There is no family history of bicuspid aortic valve, aneurysms, heart transplant, pacemakers, defibrillators, or sudden cardiac death. Past Surgical History   Past Surgical History:   Procedure Laterality Date    APPENDECTOMY      COLONOSCOPY  04/2018    Dr. Lida Reed Left 2014    [place], [place]    LITHOTRIPSY      ID LAP,APPENDECTOMY N/A 5/31/2018    [facility], W/ANAST Right 6/21/2018    ROBOTIC RIGHT COLECTOMY performed by James Prado MD at [address] OFFICE/OUTPT [address] [place] [address] N/A 10/17/2018    CYSTO, LEFT URETEROSCOPY, LASER LITHOTRIPSY, BASKET RETRIEVAL OF STONES, STENT PLACEMENT performed by Julián Reyna MD at [address] [place] [address] N/A 9/4/2019    ROBOT ASSISTED LAPAROSCOPIC RADICAL PROSTATECTOMY performed by Julián Reyna MD at [place] [address] Bilateral        Review of Systems   Constitutional: Negative for chills and fever  HENT: Negative for congestion, sinus pressure, sneezing and sore throat. Eyes: Negative for pain, discharge, redness and itching. Respiratory: Negative for apnea, cough  Gastrointestinal: Negative for blood in stool, constipation, diarrhea   Endocrine: Negative for cold intolerance, heat intolerance, polydipsia. Genitourinary: Negative for dysuria, enuresis, flank pain and hematuria. Musculoskeletal: Negative for arthralgias, joint swelling and neck pain. Neurological: Negative for numbness and headaches. Psychiatric/Behavioral: Negative for agitation, confusion, decreased concentration and dysphoric mood.      Objective:     BP (!) 158/88   Pulse 74   Ht 5' 10\" (1.778 m)   Wt 215 lb (97.5 kg)   BMI 30.85 kg/m²     Wt Readings from Last 3 Encounters:   01/14/21 215 lb (97.5 kg)   08/25/20 208 lb 9.6 oz (94.6 kg)   08/11/20 209 lb No results found for: INR, PROTIME      No results found for: LABA1C    No results found for: TRIG, HDL, LDLCALC, LDLDIRECT, LABVLDL    No results found for: TSH      Testing Reviewed:      I have individually reviewed the cardiac test below:    ECHO:   Results for orders placed during the hospital encounter of 07/15/20   ECHO Complete 2D W Doppler W Color    Narrative Transthoracic Echocardiography Report (TTE)     Demographics      Patient Name   Joanna Hyde  Gender              Male                  T      MR #           [id number]       Race                                                     Ethnicity      Account #      [de-identified]       Room Number      Accession      [id number]       Date of Study       07/15/2020   Number      Date of Birth  1940      Referring Physician Mira Marroquin CNP      Age            79 year(s)      Neelam Trinidad MD                                  Physician     Procedure    Type of Study      TTE procedure:ECHOCARDIOGRAM COMPLETE 2D W DOPPLER W COLOR. Procedure Date  Date: 07/15/2020 Start: 09:30 AM    Study Location: Echo Lab  Technical Quality: Limited visualization due to poor acoustical window. Indications:Atrial fibrillation. Additional Medical History: Former smoker, prostate cancer, dyspnea on  exertion, diabetes, hypertension, hyperlipidemia    Patient Status: Routine    Height: 70 inches Weight: 208 pounds BSA: 2.12 m^2 BMI: 29.85 kg/m^2    BP: 154/78 mmHg     Conclusions      Summary   Technically difficult examination. Ejection fraction was estimated at 60-65%. There was trace mitral regurgitation. Ascending aorta = 3.5 cm. IVC size is within normal limits with normal respiratory phasic changes. Signature      ----------------------------------------------------------------   Electronically signed by Shlomo Gomes MD (Interpreting   physician) on 07/15/2020 at 05:55 PM   ----------------------------------------------------------------      Findings      Mitral Valve   The mitral valve structure was normal with normal leaflet separation. DOPPLER: The transmitral velocity was within the normal range with no   evidence for mitral stenosis. There was trace mitral regurgitation. Aortic Valve   The aortic valve was trileaflet with normal thickness and cuspal   separation. DOPPLER: Transaortic velocity was within the normal range with   no evidence of aortic stenosis. There was no evidence of aortic   regurgitation. Tricuspid Valve   The tricuspid valve structure was normal with normal leaflet separation. DOPPLER: There was no evidence of tricuspid stenosis. There was no   evidence of tricuspid regurgitation. Pulmonic Valve   The pulmonic valve leaflets were not well seen. DOPPLER: The transpulmonic   velocity was within the normal range with no evidence for regurgitation. Left Atrium   Left atrial size was normal.      Left Ventricle   Normal left ventricular size and systolic function. There were no regional wall motion abnormalities. Wall thickness was within normal limits. Ejection fraction was estimated at 60-65%. Right Atrium   Right atrial size was normal.      Right Ventricle   The right ventricular size was normal with normal systolic function and   wall thickness. Pericardial Effusion   The pericardium was normal in appearance with no evidence of a pericardial   effusion. Pleural Effusion   No evidence of pleural effusion. Aorta / Great Vessels   -Ascending aorta = 3.5 cm. -IVC size is within normal limits with normal respiratory phasic changes.      M-Mode/2D Measurements & Calculations      LV Diastolic   LV Systolic Dimension:    AV Cusp Separation: 2.3 cmLA   Dimension: 4.4 2.9 cm                    Dimension: 3.9 cmAO Root   cm             LV Volume Diastolic: 58.6 Dimension: 3.4 cmLA Area: 13.4   LV FS:34.1 %   ml                        cm^2   LV PW          LV Volume Systolic: 84.8   Diastolic: 1.1 ml   cm             LV EDV/LV EDV Index: 87.7   Septum         ml/41 m^2LV ESV/LV ESV    RV Diastolic Dimension: 2.9 cm   Diastolic: 1   Index: 20.4 ml/15 m^2   cm             EF Calculated: 63.3 %     LA/Aorta: 1.15                                            Ascending Aorta: 3.5 cm                                            LA volume/Index: 31.9 ml /15m^2     Doppler Measurements & Calculations      MV Peak E-Wave: 110 AV Peak Velocity:     LVOT Peak Velocity: 71.9 cm/s   cm/s                98.6 cm/s             LVOT Mean Velocity: 48.8 cm/s                       AV Peak Gradient:     LVOT Peak Gradient: 2 mmHgLVOT   MV Peak Gradient:   3.89 mmHg             Mean Gradient: 1 mmHg   4.84 mmHg           AV Mean Velocity:                       77.1 cm/s             TV Peak E-Wave: 56.1 cm/s   MV Deceleration     AV Mean Gradient: 3   Time: 145 msec      mmHg                  TV Peak Gradient: 1.26 mmHg   MV P1/2t: 43 msec   AV VTI: 21.2 cm       TR Velocity:191 cm/s   MVA by PHT:5.12                           TR Gradient:14.59 mmHg   cm^2                                      PV Peak Velocity: 60.8 cm/s                       LVOT VTI: 13.9 cm     PV Peak Gradient: 1.48 mmHg                       IVRT: 63 msec      MR Velocity: 413    AV DVI (VTI): 0.66AV   cm/s                DVI (Vmax):0.73     [id number]  [id number]        Assessment/Plan   Atrial flutter/fib, [id number] = 3  Preserved EF, 7/2020  Doing well, no issues, no bleeding on [address]. BP 150s, re-check today. If still high, increase HCTZ to 25 mg q day. Usually runs normal at home per wife. Will continue to follow. Discussed diet/exercise/BP/weight loss/health lifestyle choices/lipids; the patient understands the goals and will try to comply.     Disposition:  1 year         Electronically signed by Ellen Wilcox MD   1/14/2021 at 2:51 PM EST

## 2021-02-04 ENCOUNTER — HOSPITAL ENCOUNTER (OUTPATIENT)
Age: 81
Discharge: HOME | End: 2021-02-04
Payer: MEDICARE

## 2021-02-04 DIAGNOSIS — C61 PROSTATE CANCER (HCC): ICD-10-CM

## 2021-02-04 LAB — PROSTATE SPECIFIC ANTIGEN: < 0.02 NG/ML (ref 0–1)

## 2021-02-04 PROCEDURE — 36415 COLL VENOUS BLD VENIPUNCTURE: CPT

## 2021-02-04 PROCEDURE — 84153 ASSAY OF PSA TOTAL: CPT

## 2021-02-12 ENCOUNTER — OFFICE VISIT (OUTPATIENT)
Dept: UROLOGY | Age: 81
End: 2021-02-12
Payer: MEDICARE

## 2021-02-12 VITALS — TEMPERATURE: 97.9 F | WEIGHT: 212 LBS | BODY MASS INDEX: 30.35 KG/M2 | HEIGHT: 70 IN

## 2021-02-12 DIAGNOSIS — C61 PROSTATE CANCER (HCC): Primary | ICD-10-CM

## 2021-02-12 DIAGNOSIS — R31.29 MICROSCOPIC HEMATURIA: ICD-10-CM

## 2021-02-12 LAB
BACTERIA: NORMAL
BILIRUBIN URINE: NEGATIVE
BILIRUBIN URINE: NEGATIVE
BLOOD URINE, POC: NORMAL
BLOOD, URINE: NEGATIVE
CASTS: NORMAL /LPF
CASTS: NORMAL /LPF
CHARACTER, URINE: CLEAR
CHARACTER, URINE: CLEAR
COLOR, URINE: YELLOW
COLOR: YELLOW
CRYSTALS: NORMAL
EPITHELIAL CELLS, UA: NORMAL /HPF
GLUCOSE URINE: NEGATIVE MG/DL
GLUCOSE, URINE: NEGATIVE MG/DL
KETONES, URINE: NEGATIVE
KETONES, URINE: NEGATIVE
LEUKOCYTE CLUMPS, URINE: NEGATIVE
LEUKOCYTE ESTERASE, URINE: NEGATIVE
MISCELLANEOUS LAB TEST RESULT: NORMAL
NITRITE, URINE: NEGATIVE
NITRITE, URINE: NEGATIVE
PH UA: 7 (ref 5–9)
PH, URINE: 7 (ref 5–9)
PROTEIN UA: NEGATIVE MG/DL
PROTEIN, URINE: NEGATIVE MG/DL
RBC URINE: NORMAL /HPF
RENAL EPITHELIAL, UA: NORMAL
SPECIFIC GRAVITY UA: 1.01 (ref 1–1.03)
SPECIFIC GRAVITY, URINE: 1.02 (ref 1–1.03)
UROBILINOGEN, URINE: 0.2 EU/DL (ref 0–1)
UROBILINOGEN, URINE: 0.2 EU/DL (ref 0–1)
WBC UA: NORMAL /HPF
YEAST: NORMAL

## 2021-02-12 PROCEDURE — 1036F TOBACCO NON-USER: CPT | Performed by: NURSE PRACTITIONER

## 2021-02-12 PROCEDURE — 81003 URINALYSIS AUTO W/O SCOPE: CPT | Performed by: NURSE PRACTITIONER

## 2021-02-12 PROCEDURE — G8484 FLU IMMUNIZE NO ADMIN: HCPCS | Performed by: NURSE PRACTITIONER

## 2021-02-12 PROCEDURE — 4040F PNEUMOC VAC/ADMIN/RCVD: CPT | Performed by: NURSE PRACTITIONER

## 2021-02-12 PROCEDURE — G8427 DOCREV CUR MEDS BY ELIG CLIN: HCPCS | Performed by: NURSE PRACTITIONER

## 2021-02-12 PROCEDURE — G8417 CALC BMI ABV UP PARAM F/U: HCPCS | Performed by: NURSE PRACTITIONER

## 2021-02-12 PROCEDURE — 99213 OFFICE O/P EST LOW 20 MIN: CPT | Performed by: NURSE PRACTITIONER

## 2021-02-12 PROCEDURE — 1123F ACP DISCUSS/DSCN MKR DOCD: CPT | Performed by: NURSE PRACTITIONER

## 2021-02-12 ASSESSMENT — ENCOUNTER SYMPTOMS
BACK PAIN: 0
ABDOMINAL PAIN: 0

## 2021-02-12 NOTE — PROGRESS NOTES
[id number] [facility] [place] [address] [address]  Dept: [phone]  Loc: [phone]    Visit Date: 2/12/2021        HPI:     Alexa Guzman is a 79 y.o. male who presents today for:  Chief Complaint   Patient presents with    Follow-up     psa prior    Prostate Cancer       HPI   Pt seen in follow up for prostate cancer. He underwent Robotic-Assisted Laparoscopic Radical Prostatectomy (RALRP) and bilateral pelvic lymph node dissection and bilateral nerve wrap 9/4/19 by Dr. Nadia Carroll. Tumtum Fossa pathology resulted [place] 4+3=7 invasive moderately differentiated adenocarcinoma with extraprostatic extension, seminal vesicles and lymph nodes negative for neoplasm.  Grade group 3.  PT3a, pN0.  Preoperative decipher testing high risk. Pt reports urinary incontinence resplved    Pt prescribed sildenafil 100 mg at last appt for ED. He reports it only worked partially and wasn't very effective. Pt also has a hx of appendiceal ca s/p resection.  Follows with Dr. Tori Morse of medical oncology.      Current Outpatient Medications   Medication Sig Dispense Refill    sildenafil (VIAGRA) 100 MG tablet Take 1 tablet by mouth as needed for Erectile Dysfunction (Take 3 times weekly prn for erectile dysfunction.) 30 tablet 1    rivaroxaban (XARELTO) 20 MG TABS tablet Take 20 mg by mouth Daily with supper      FREESTYLE LITE strip USE 1 STRIP TO CHECK GLUCOSE ONCE DAILY AS DIRECTED  3    Cholecalciferol (VITAMIN D3) 2000 units CAPS Take 1 capsule by mouth 2 times daily       hydrochlorothiazide (HYDRODIURIL) 25 MG tablet Take 12.5 mg by mouth daily       losartan (COZAAR) 50 MG tablet Take 25 mg by mouth every morning       simvastatin (ZOCOR) 40 MG tablet Take 40 mg by mouth nightly        No current facility-administered medications for this visit.         Past Medical History  Ayaka Caldwell  has a past medical history of Atrial flutter ([address] Rate and Rhythm: Normal rate and regular rhythm. Pulses: Normal pulses. Heart sounds: Normal heart sounds. Pulmonary:      Effort: Pulmonary effort is normal. No respiratory distress. Abdominal:      Tenderness: There is no right CVA tenderness. Musculoskeletal: Normal range of motion. Skin:     General: Skin is warm and dry. Neurological:      Mental Status: He is alert and oriented to person, place, and time. Mental status is at baseline. Psychiatric:         Mood and Affect: Mood normal.         Behavior: Behavior normal.         Thought Content: Thought content normal.         POC  Results for POC orders placed in visit on 02/12/21   POCT Urinalysis No Micro (Auto)   Result Value Ref Range    Glucose, Ur Negative NEGATIVE mg/dl    Bilirubin Urine Negative     Ketones, Urine Negative NEGATIVE    Specific Gravity, Urine 1.025 1.002 - 1.030    Blood, UA POC Trace-intact NEGATIVE    pH, Urine 7.00 5.0 - 9.0    Protein, Urine Negative NEGATIVE mg/dl    Urobilinogen, Urine 0.20 0.0 - 1.0 eu/dl    Nitrite, Urine Negative NEGATIVE    Leukocyte Clumps, Urine Negative NEGATIVE    Color, Urine Yellow [id number]-STRAW    Character, Urine Clear [id number]         Patients recent PSA values are as follows  Lab Results   Component Value Date    PSA <0.02 02/04/2021    PSA <0.02 08/04/2020    PSA <0.02 05/06/2020        Recent BUN/Creatinine:  Lab Results   Component Value Date    BUN 14 08/25/2020    CREATININE 1.2 08/25/2020    CREATININE 1.3 02/20/2020     FINAL DIAGNOSIS:   A. Soft tissue, right pelvic region, excisional biopsy:    Benign fibroadipose tissue with infarct. B. Prostate, radical prostatectomy:    Invasive, moderately differentiated adenocarcinoma.    [place] score = 7 (4+3), grade group = 3.    Maximum tumor size =1.4 cm    Extraprostatic extension, right posterior, 20-30 mm.    Perineural invasion    pT3a, pN0. Seminal vesicles, resections:    Negative for neoplasm.    C. Right pelvic lymph nodes, resections:    Negative for neoplasm in 6 out of 6 lymph nodes. D. Left pelvic lymph node negative for neoplasm in, resection:    Negative for neoplasm in one out of one lymph node. Assessment:   Prostate cancer  Erectile dysfunction  Hx of appendiceal ca    Plan:     PSA remains undetectable indicating control of prostate cancer at this time. Pt had extraprostatic extension and high risk decipher score preop. We have previously discussed consideration of rad/onc referral and Emily Maher prefers to await rising PSA before referral.  Repeat PSA in 6 months. Discussed Trimix trial in office today with Emily Maher and his wife. He will call into the office if he wishes to proceed.      F/u in 6 months with PSA prior to appt

## 2021-02-24 ENCOUNTER — HOSPITAL ENCOUNTER (OUTPATIENT)
Dept: INFUSION THERAPY | Age: 81
Discharge: HOME | End: 2021-02-24
Payer: MEDICARE

## 2021-02-24 ENCOUNTER — OFFICE VISIT (OUTPATIENT)
Dept: ONCOLOGY | Age: 81
End: 2021-02-24
Payer: MEDICARE

## 2021-02-24 VITALS
HEIGHT: 70 IN | HEART RATE: 92 BPM | SYSTOLIC BLOOD PRESSURE: 148 MMHG | WEIGHT: 212.6 LBS | TEMPERATURE: 97.3 F | OXYGEN SATURATION: 95 % | DIASTOLIC BLOOD PRESSURE: 68 MMHG | RESPIRATION RATE: 16 BRPM | BODY MASS INDEX: 30.43 KG/M2

## 2021-02-24 DIAGNOSIS — C18.1 CANCER OF APPENDIX (HCC): ICD-10-CM

## 2021-02-24 DIAGNOSIS — C18.1 CANCER OF APPENDIX (HCC): Primary | ICD-10-CM

## 2021-02-24 DIAGNOSIS — Z08 ENCOUNTER FOR FOLLOW-UP SURVEILLANCE OF COLON CANCER: ICD-10-CM

## 2021-02-24 DIAGNOSIS — Z90.49 S/P RIGHT COLECTOMY: ICD-10-CM

## 2021-02-24 DIAGNOSIS — Z85.038 ENCOUNTER FOR FOLLOW-UP SURVEILLANCE OF COLON CANCER: ICD-10-CM

## 2021-02-24 DIAGNOSIS — C61 PROSTATE CANCER (HCC): ICD-10-CM

## 2021-02-24 LAB
ABSOLUTE IMMATURE GRANULOCYTE: 0.01 THOU/MM3 (ref 0–0.07)
ALBUMIN SERPL-MCNC: 4 G/DL (ref 3.5–5.1)
ALP BLD-CCNC: 50 U/L (ref 38–126)
ALT SERPL-CCNC: 29 U/L (ref 11–66)
AST SERPL-CCNC: 26 U/L (ref 5–40)
BASINOPHIL, AUTOMATED: 1 % (ref 0–3)
BASOPHILS ABSOLUTE: 0.1 THOU/MM3 (ref 0–0.1)
BILIRUB SERPL-MCNC: 0.3 MG/DL (ref 0.3–1.2)
BILIRUBIN DIRECT: < 0.2 MG/DL (ref 0–0.3)
BUN, WHOLE BLOOD: 12 MG/DL (ref 8–26)
CHLORIDE, WHOLE BLOOD: 100 MEQ/L (ref 98–109)
CREATININE, WHOLE BLOOD: 1.1 MG/DL (ref 0.5–1.2)
EOSINOPHILS ABSOLUTE: 0.3 THOU/MM3 (ref 0–0.4)
EOSINOPHILS RELATIVE PERCENT: 5 % (ref 0–4)
GFR, ESTIMATED: 68 ML/MIN/1.73M2
GLUCOSE, WHOLE BLOOD: 89 MG/DL (ref 70–108)
HCT VFR BLD CALC: 40.6 % (ref 42–52)
HEMOGLOBIN: 13.2 GM/DL (ref 14–18)
IMMATURE GRANULOCYTES: 0 %
IONIZED CALCIUM, WHOLE BLOOD: 1.14 MMOL/L (ref 1.12–1.32)
LYMPHOCYTES # BLD: 23 % (ref 15–47)
LYMPHOCYTES ABSOLUTE: 1.5 THOU/MM3 (ref 1–4.8)
MCH RBC QN AUTO: 30.1 PG (ref 26–33)
MCHC RBC AUTO-ENTMCNC: 32.5 GM/DL (ref 32.2–35.5)
MCV RBC AUTO: 93 FL (ref 80–94)
MONOCYTES ABSOLUTE: 1.2 THOU/MM3 (ref 0.4–1.3)
MONOCYTES: 18 % (ref 0–12)
PDW BLD-RTO: 13.3 % (ref 11.5–14.5)
PLATELET # BLD: 224 THOU/MM3 (ref 130–400)
PMV BLD AUTO: 9.3 FL (ref 9.4–12.4)
POTASSIUM, WHOLE BLOOD: 4.1 MEQ/L (ref 3.5–4.9)
RBC # BLD: 4.39 MILL/MM3 (ref 4.7–6.1)
SEG NEUTROPHILS: 54 % (ref 43–75)
SEGMENTED NEUTROPHILS ABSOLUTE COUNT: 3.7 THOU/MM3 (ref 1.8–7.7)
SODIUM, WHOLE BLOOD: 136 MEQ/L (ref 138–146)
TOTAL CO2, WHOLE BLOOD: 26 MEQ/L (ref 23–33)
TOTAL PROTEIN: 7.8 G/DL (ref 6.1–8)
WBC # BLD: 6.8 THOU/MM3 (ref 4.8–10.8)

## 2021-02-24 PROCEDURE — 4040F PNEUMOC VAC/ADMIN/RCVD: CPT | Performed by: INTERNAL MEDICINE

## 2021-02-24 PROCEDURE — 85025 COMPLETE CBC W/AUTO DIFF WBC: CPT

## 2021-02-24 PROCEDURE — 1123F ACP DISCUSS/DSCN MKR DOCD: CPT | Performed by: INTERNAL MEDICINE

## 2021-02-24 PROCEDURE — G8427 DOCREV CUR MEDS BY ELIG CLIN: HCPCS | Performed by: INTERNAL MEDICINE

## 2021-02-24 PROCEDURE — 1036F TOBACCO NON-USER: CPT | Performed by: INTERNAL MEDICINE

## 2021-02-24 PROCEDURE — 99211 OFF/OP EST MAY X REQ PHY/QHP: CPT

## 2021-02-24 PROCEDURE — G8484 FLU IMMUNIZE NO ADMIN: HCPCS | Performed by: INTERNAL MEDICINE

## 2021-02-24 PROCEDURE — G8417 CALC BMI ABV UP PARAM F/U: HCPCS | Performed by: INTERNAL MEDICINE

## 2021-02-24 PROCEDURE — 80076 HEPATIC FUNCTION PANEL: CPT

## 2021-02-24 PROCEDURE — 36415 COLL VENOUS BLD VENIPUNCTURE: CPT

## 2021-02-24 PROCEDURE — 99214 OFFICE O/P EST MOD 30 MIN: CPT | Performed by: INTERNAL MEDICINE

## 2021-02-24 PROCEDURE — 80047 BASIC METABLC PNL IONIZED CA: CPT

## 2021-02-24 ASSESSMENT — ENCOUNTER SYMPTOMS
COLOR CHANGE: 0
BLOOD IN STOOL: 0
COUGH: 0
DIARRHEA: 0
RECTAL PAIN: 0
SHORTNESS OF BREATH: 0
ABDOMINAL DISTENTION: 0
SORE THROAT: 0
FACIAL SWELLING: 0
CHEST TIGHTNESS: 0
ABDOMINAL PAIN: 0
NAUSEA: 0
WHEEZING: 0
VOMITING: 0
CONSTIPATION: 0
TROUBLE SWALLOWING: 0
BACK PAIN: 0
EYE DISCHARGE: 0

## 2021-02-24 NOTE — PATIENT INSTRUCTIONS
1.  CT of the abdomen and pelvis end of March 2021  2. RTC to see me in 1 year.   On RTC labs: CBC, BMP, LFTs, CEA

## 2021-02-24 NOTE — PROGRESS NOTES
[facility] PROFESSIONAL SERVS  [facility]  [address]  [address]  [address] [address]  Dept: [phone]  Dept Fax: [phone]: [phone]     Visit Date: 2/24/2021     Sheri Lynn is a 79 y.o. male who presents today for:   Chief Complaint   Patient presents with    Follow-up     low grade mucinous neoplasm of appendix        HPI:   This is an 79-year-old patient with a history of  low-grade appendiceal mucinous neoplasm, pTis(LAMN). He underwent colonoscopy demonstrated  few tubular adenoma polyps and enlarged swollen appendiceal orifice.  Therefore he had CT of the abdomen on April 23, 2018 that showed:  1. Gleda García is diffuse dilatation of the appendix which measures 1.8 cm   in diameter. There is no additional evidence for acute appendicitis. There is no periappendiceal edema or appendicolith. 2.  Nonobstructing calculus in the lower pole of the left kidney. 3.  Mild enlargement of the prostate gland with mild diffuse   thickening of the wall of the urinary bladder which may represent   bladder out obstruction. 4.  Calcified granuloma in the left lung base. 5.  Moderate atherosclerosis of the abdominal aorta and iliac   arteries. 6.  Grade 1 anterolisthesis of L4 on L5.   7.  Severe degenerative disc disease at L5-S1. He underwent diagnostic laparoscopy with appendectomy on May 31, 2018. Pathology demonstrated:   Low-grade appendiceal mucinous neoplasm, pTis(LAMN).   Proximal margin focally involved by low-grade mucinous appendiceal neoplasm. Due to positive margin on the June 21, 2018 he underwent right colon resection:   Negative for malignancy.   Pericolonic lymph nodes (12): Negative for malignancy. His postsurgical course was uncomplicated. In July 2019 he was diagnosed with prostate cancer and on September 4, 2019 he underwent  radical prostatectomy with lymph node dissection. Final pathology report showed:   A.  Soft tissue, right pelvic region, excisional biopsy:   Benign fibroadipose tissue with infarct. B. Prostate, radical prostatectomy:   Invasive, moderately differentiated adenocarcinoma.   [place] score = 7 (4+3), grade group = 3.   Maximum tumor size =1.4 cm   Extraprostatic extension, right posterior, 20-30 mm.   Perineural invasion   pT3a, pN0. Seminal vesicles, resections:   Negative for neoplasm. C. Right pelvic lymph nodes, resections:   Negative for neoplasm in 6 out of 6 lymph nodes. D. Left pelvic lymph node negative for neoplasm in, resection:   Negative for neoplasm in one out of one lymph node. Interim history on 02/24/2021:   Presents to the medical oncology clinic for 6 months follow-up visit. He denies having abdominal pain. No difficulty with bowel movements. His appetite is fine and his weight is stable. He denies any recent hospitalizations. He is under urologic care for his prostate cancer, his recent PSA was undetectable  Remaining 12 point review of system is negative.     HPI   Past Medical History:   Diagnosis Date    Atrial flutter ([address]     Diabetes mellitus ([address]     diet and exercise controlled    History of kidney stones 1996    Hyperlipidemia     Hypertension       Past Surgical History:   Procedure Laterality Date    APPENDECTOMY      COLONOSCOPY  04/2018    Dr. Sudarshan Vasquez Left 2014    [place], [place]    LITHOTRIPSY      [place] LAP,APPENDECTOMY N/A 5/31/2018    APPENDECTOMY LAPAROSCOPIC performed by Nickolas Dodd MD at [facility], PARTIAL, W/ANAST Right 6/21/2018    ROBOTIC RIGHT COLECTOMY performed by Nickolas Dodd MD at [address] OFFICE/OUTPT [facility] N/A 10/17/2018    CYSTO, LEFT URETEROSCOPY, LASER LITHOTRIPSY, BASKET RETRIEVAL OF STONES, STENT PLACEMENT performed by Laury Roman MD at [facility] 9/4/2019    ROBOT ASSISTED LAPAROSCOPIC RADICAL PROSTATECTOMY performed by Laury Roman MD at [facility] Bilateral       Family History   Problem Relation Age of Onset    Diabetes Mother     Stroke Mother     Kidney Disease Father     Diabetes Sister     Breast Cancer Sister     Dementia Sister     Heart Disease Brother     Diabetes Brother     Stroke Brother       Social History     Tobacco Use    Smoking status: Former Smoker     Packs/day: 1.00     Years: 38.00     Pack years: 38.00     Quit date:      Years since quittin.1    Smokeless tobacco: Never Used   Substance Use Topics    Alcohol use: Yes     Comment: socially beer on weekends      Current Outpatient Medications   Medication Sig Dispense Refill    rivaroxaban (XARELTO) 20 MG TABS tablet Take 20 mg by mouth Daily with supper      FREESTYLE LITE strip USE 1 STRIP TO CHECK GLUCOSE ONCE DAILY AS DIRECTED  3    Cholecalciferol (VITAMIN D3) 2000 units CAPS Take 1 capsule by mouth 2 times daily       hydrochlorothiazide (HYDRODIURIL) 25 MG tablet Take 12.5 mg by mouth daily       losartan (COZAAR) 50 MG tablet Take 25 mg by mouth every morning       simvastatin (ZOCOR) 40 MG tablet Take 40 mg by mouth nightly       sildenafil (VIAGRA) 100 MG tablet Take 1 tablet by mouth as needed for Erectile Dysfunction (Take 3 times weekly prn for erectile dysfunction.) 30 tablet 1     No current facility-administered medications for this visit.        Allergies   Allergen Reactions    Lisinopril Other (See Comments)     coughing  Other reaction(s): Cough      Health Maintenance   Topic Date Due    Lipid screen  05/10/1950    COVID-19 Vaccine (1 of 2) [birth year]/1955    DTaP/Tdap/Td vaccine (1 - Tdap) 05/10/1958    Shingles Vaccine (2 of 3) 2013    Annual Wellness Visit (AWV)  2019    Potassium monitoring  2020    Creatinine monitoring  2021    PSA counseling  2022    Flu vaccine  Completed    Pneumococcal 65+ years Vaccine  Completed    Hepatitis A vaccine  Aged C/ [address] Hepatitis B vaccine  Aged Out    Hib vaccine  Aged Out    Meningococcal (ACWY) vaccine  Aged Out        Subjective:   Review of Systems   Constitutional: Negative for activity change, appetite change, fatigue and fever. HENT: Negative for congestion, dental problem, facial swelling, hearing loss, mouth sores, nosebleeds, sore throat, tinnitus and trouble swallowing. Eyes: Negative for discharge and visual disturbance. Respiratory: Negative for cough, chest tightness, shortness of breath and wheezing. Cardiovascular: Negative for chest pain, palpitations and leg swelling. Gastrointestinal: Negative for abdominal distention, abdominal pain, blood in stool, constipation, diarrhea, nausea, rectal pain and vomiting. Endocrine: Negative for cold intolerance, polydipsia and polyuria. Genitourinary: Negative for decreased urine volume, difficulty urinating, dysuria, flank pain, hematuria and urgency. Musculoskeletal: Negative for arthralgias, back pain, gait problem, joint swelling, myalgias and neck stiffness. Skin: Negative for color change, rash and wound. Neurological: Negative for dizziness, tremors, seizures, speech difficulty, weakness, light-headedness, numbness and headaches. Hematological: Negative for adenopathy. Does not bruise/bleed easily. Psychiatric/Behavioral: Negative for confusion and sleep disturbance. The patient is not nervous/anxious. Objective:   Physical Exam   Constitutional: He is oriented to person, place, and time. He appears well-developed and well-nourished. No distress. HENT:   Head: Normocephalic. Mouth/Throat: Oropharynx is clear and moist. No oropharyngeal exudate. Eyes: Pupils are equal, round, and reactive to light. EOM are normal. Right eye exhibits no discharge. Left eye exhibits no discharge. No scleral icterus. Neck: Normal range of motion. Neck supple. No JVD present. No tracheal deviation present. No thyromegaly present.    Cardiovascular: Normal rate and normal heart sounds. Exam reveals no gallop and no friction rub. No murmur heard. Pulmonary/Chest: Effort normal and breath sounds normal. No stridor. No respiratory distress. He has no wheezes. He has no rales. He exhibits no tenderness. Abdominal: Soft. Bowel sounds are normal. He exhibits no distension and no mass. There is no abdominal tenderness. There is no rebound. Musculoskeletal: Normal range of motion. General: No edema. Comments: Good range of motion in all four extremities. Lymphadenopathy:     He has no cervical adenopathy. Neurological: He is alert and oriented to person, place, and time. He has normal reflexes. No cranial nerve deficit. He exhibits normal muscle tone. Skin: Skin is warm. No rash noted. No erythema. Psychiatric: He has a normal mood and affect. His behavior is normal. Judgment and thought content normal.   Vitals reviewed. BP (!) 148/68 (Site: Left Upper Arm, Position: Sitting, Cuff Size: Medium Adult)   Pulse 92   Temp 97.3 °F (36.3 °C) (Infrared)   Resp 16   Ht 5' 10\" (1.778 m)   Wt 212 lb 9.6 oz (96.4 kg)   SpO2 95%   BMI 30.50 kg/m²      ECOG status is 0.     Imaging studies and labs:     Lab Results   Component Value Date    WBC 6.8 02/24/2021    HGB 13.2 (L) 02/24/2021    HCT 40.6 (L) 02/24/2021    MCV 93 02/24/2021     02/24/2021       Chemistry        Component Value Date/Time     (L) 02/24/2021 1103     09/05/2019 0630    K 4.1 02/24/2021 1103    K 4.2 09/05/2019 0630    K 3.3 (L) 06/22/2018 0507     09/05/2019 0630    CO2 25 08/25/2020 1042    BUN 14 08/25/2020 1042    CREATININE 1.1 02/24/2021 1103    CREATININE 1.3 02/20/2020 0820        Component Value Date/Time    CALCIUM 8.5 09/05/2019 0630    ALKPHOS 50 02/24/2021 1103    AST 26 02/24/2021 1103    ALT 29 02/24/2021 1103    BILITOT 0.3 02/24/2021 1103        CEA:  December 2018:1.3  May 2019:1.4  November 2019: 1.5  February 2020: 1.5  August 2020: 1.3    CT of the abdomen on February 20, 2020 showed:  1. There is redemonstration of a fluid collection along the left lateral pelvic sidewall. This has decreased in size since prior exam and could be related to a postoperative lymphocele or seroma. The previously seen right-sided pelvic fluid collection    has resolved since the previous study. 2. Postsurgical changes involving the colon as well as the prostate gland. No evidence of recurrent disease. 3. The liver appears slightly hypoattenuated which could be related to fatty infiltration.         CT of the abdomen and pelvis on September 2, 2020 showed:  1. No acute intra-abdominal or intrapelvic findings. 2. No metastatic disease in the abdomen or pelvis. Assessment/Plan: 1. Low-grade appendiceal mucinous neoplasm, pTis(LAMN). Appendiceal mucinous neoplasms are a heterogeneous group of tumors. Treatment is based on stage and histology. Low-grade tumors are treated surgically with resection of the primary site in early stage disease, no further treatment is required after surgery. Appendiceal mucinous neoplasms sometimes present with peritoneal dissemination, however the patient did not have evidence of peritoneal dissemination. Radically resected LAMN, even with limited peritoneal spread, carries a low recurrence risk. Today, the patient denies having any complaints/symptoms. Specifically no abdominal pain, no abdominal distention, no change in bowel movement pattern. His labs are within normal limits. His CEA is normal.  There is no evidence of disease recurrence on today's physical examination. His CT of the abdomen performed in September 2020 showed no evidence of recurrent or metastatic disease. His CEA today is 1.3. The patient will have CT of the abdomen and pelvis at the end of March 2021. Since he is almost 3 years from his diagnosis I will see him now annually  2. Prostate cancer. PT3a, pN0.  With extraprostatic extension (pT3a), local control tends to be a problem, particularly in the presence of positive margins. With extraprostatic extension (pT3a), local control tends to be more of a problem, more so with the presence of positive margins. The patient is currently followed by surveillance. His most recent PSA in February 2021 was below 0.02        Return in about 1 year (around 2/25/2022).      Orders Placed:   Orders Placed This Encounter   Procedures    CT ABDOMEN PELVIS W IV CONTRAST Additional Contrast? Oral     Standing Status:   Future     Standing Expiration Date:   2/24/2022     Order Specific Question:   Additional Contrast?     Answer:   Oral    CBC Auto Differential     Standing Status:   Future     Number of Occurrences:   1     Standing Expiration Date:   2/24/2022    POC PANEL BMP W/IOCA     Standing Status:   Future     Number of Occurrences:   1     Standing Expiration Date:   2/24/2022    Hepatic Function Panel     Standing Status:   Future     Number of Occurrences:   1     Standing Expiration Date:   2/24/2022    CEA     Standing Status:   Future     Standing Expiration Date:   2/24/2022      Addendum on September 2, 2020:  CT of the abdomen and pelvis performed today showed no evidence of metastatic or recurrent disease in the abdomen or pelvis

## 2021-03-24 ENCOUNTER — HOSPITAL ENCOUNTER (OUTPATIENT)
Dept: CT IMAGING | Age: 81
Discharge: HOME | End: 2021-03-24
Payer: MEDICARE

## 2021-03-24 DIAGNOSIS — C18.1 CANCER OF APPENDIX (HCC): ICD-10-CM

## 2021-03-24 PROCEDURE — 6360000004 HC RX CONTRAST MEDICATION: Performed by: INTERNAL MEDICINE

## 2021-03-24 PROCEDURE — 74177 CT ABD & PELVIS W/CONTRAST: CPT

## 2021-03-24 RX ADMIN — IOHEXOL 20 ML: 240 INJECTION, SOLUTION INTRATHECAL; INTRAVASCULAR; INTRAVENOUS; ORAL at 10:40

## 2021-03-24 RX ADMIN — IOPAMIDOL 100 ML: 755 INJECTION, SOLUTION INTRAVENOUS at 10:39

## 2021-08-03 ENCOUNTER — HOSPITAL ENCOUNTER (OUTPATIENT)
Age: 81
Discharge: HOME | End: 2021-08-03
Payer: MEDICARE

## 2021-08-03 DIAGNOSIS — C61 PROSTATE CANCER (HCC): ICD-10-CM

## 2021-08-03 LAB — PROSTATE SPECIFIC ANTIGEN: < 0.02 NG/ML (ref 0–1)

## 2021-08-03 PROCEDURE — 84153 ASSAY OF PSA TOTAL: CPT

## 2021-08-03 PROCEDURE — 36415 COLL VENOUS BLD VENIPUNCTURE: CPT

## 2021-08-12 ENCOUNTER — OFFICE VISIT (OUTPATIENT)
Dept: UROLOGY | Age: 81
End: 2021-08-12
Payer: MEDICARE

## 2021-08-12 VITALS
WEIGHT: 207 LBS | BODY MASS INDEX: 32.49 KG/M2 | DIASTOLIC BLOOD PRESSURE: 78 MMHG | SYSTOLIC BLOOD PRESSURE: 138 MMHG | HEIGHT: 67 IN

## 2021-08-12 DIAGNOSIS — R31.29 MICROSCOPIC HEMATURIA: ICD-10-CM

## 2021-08-12 DIAGNOSIS — C61 PROSTATE CANCER (HCC): Primary | ICD-10-CM

## 2021-08-12 LAB
BACTERIA: NORMAL
BILIRUBIN URINE: NEGATIVE
BILIRUBIN URINE: NEGATIVE
BLOOD URINE, POC: NORMAL
BLOOD, URINE: NEGATIVE
CASTS: NORMAL /LPF
CASTS: NORMAL /LPF
CHARACTER, URINE: CLEAR
CHARACTER, URINE: CLEAR
COLOR, URINE: YELLOW
COLOR: YELLOW
CRYSTALS: NORMAL
EPITHELIAL CELLS, UA: NORMAL /HPF
GLUCOSE URINE: NEGATIVE MG/DL
GLUCOSE, URINE: NEGATIVE MG/DL
KETONES, URINE: NEGATIVE
KETONES, URINE: NEGATIVE
LEUKOCYTE CLUMPS, URINE: NEGATIVE
LEUKOCYTE ESTERASE, URINE: NEGATIVE
MISCELLANEOUS LAB TEST RESULT: NORMAL
NITRITE, URINE: NEGATIVE
NITRITE, URINE: NEGATIVE
PH UA: 7 (ref 5–9)
PH, URINE: 7 (ref 5–9)
PROTEIN UA: NEGATIVE MG/DL
PROTEIN, URINE: NEGATIVE MG/DL
RBC URINE: NORMAL /HPF
RENAL EPITHELIAL, UA: NORMAL
SPECIFIC GRAVITY UA: 1.01 (ref 1–1.03)
SPECIFIC GRAVITY, URINE: 1.01 (ref 1–1.03)
UROBILINOGEN, URINE: 0.2 EU/DL (ref 0–1)
UROBILINOGEN, URINE: 1 EU/DL (ref 0–1)
WBC UA: NORMAL /HPF
YEAST: NORMAL

## 2021-08-12 PROCEDURE — 1036F TOBACCO NON-USER: CPT | Performed by: NURSE PRACTITIONER

## 2021-08-12 PROCEDURE — G8427 DOCREV CUR MEDS BY ELIG CLIN: HCPCS | Performed by: NURSE PRACTITIONER

## 2021-08-12 PROCEDURE — 1123F ACP DISCUSS/DSCN MKR DOCD: CPT | Performed by: NURSE PRACTITIONER

## 2021-08-12 PROCEDURE — 99213 OFFICE O/P EST LOW 20 MIN: CPT | Performed by: NURSE PRACTITIONER

## 2021-08-12 PROCEDURE — 81003 URINALYSIS AUTO W/O SCOPE: CPT | Performed by: NURSE PRACTITIONER

## 2021-08-12 PROCEDURE — 4040F PNEUMOC VAC/ADMIN/RCVD: CPT | Performed by: NURSE PRACTITIONER

## 2021-08-12 PROCEDURE — G8417 CALC BMI ABV UP PARAM F/U: HCPCS | Performed by: NURSE PRACTITIONER

## 2021-08-12 ASSESSMENT — ENCOUNTER SYMPTOMS
NAUSEA: 0
VOMITING: 0
BACK PAIN: 0
ABDOMINAL PAIN: 0

## 2021-08-12 NOTE — PROGRESS NOTES
[address] [address]  Dept: [phone]  Loc: [phone]    Visit Date: 8/12/2021        HPI:     Man Arce is a 79 y.o. male who presents today for:  Chief Complaint   Patient presents with    Follow-up     psa prior    Prostate Cancer       HPI   Pt seen in follow up for prostate cancer. Pt underwent Robotic-Assisted Laparoscopic Radical Prostatectomy (RALRP) and bilateral pelvic lymph node dissection and bilateral nerve wrap 9/4/19 by Dr. Maria M Lantigua. Lokesh Patel pathology resulted Abel 4+3=7 invasive moderately differentiated adenocarcinoma with extraprostatic extension, seminal vesicles and lymph nodes negative for neoplasm.  Grade group 3.  PT3a, pN0.  Preoperative decipher testing high risk. Urinary incontinence resolved. Sildenafil 100 mg has not been very effective for ED. Discussed Trimix at last appt but not interested in pursuing at this time. Pt also has a hx of appendiceal ca s/p resection.  Follows with Dr. She Gamboa of medical oncology. Reports no evidence of recurrence thus far. Current Outpatient Medications   Medication Sig Dispense Refill    sildenafil (VIAGRA) 100 MG tablet Take 1 tablet by mouth as needed for Erectile Dysfunction (Take 3 times weekly prn for erectile dysfunction.) 30 tablet 1    rivaroxaban (XARELTO) 20 MG TABS tablet Take 20 mg by mouth Daily with supper      FREESTYLE LITE strip USE 1 STRIP TO CHECK GLUCOSE ONCE DAILY AS DIRECTED  3    Cholecalciferol (VITAMIN D3) 2000 units CAPS Take 1 capsule by mouth 2 times daily       hydrochlorothiazide (HYDRODIURIL) 25 MG tablet Take 12.5 mg by mouth daily       losartan (COZAAR) 50 MG tablet Take 25 mg by mouth every morning       simvastatin (ZOCOR) 40 MG tablet Take 40 mg by mouth nightly        No current facility-administered medications for this visit.        Past Medical History  Yasmin Cox  has a past medical history of Atrial flutter ([facility] [address] Diabetes mellitus ([facility] [address] History of kidney stones, Hyperlipidemia, and Hypertension. Past Surgical History  The patient  has a past surgical history that includes Lithotripsy; Retinal detachment surgery (Bilateral); Colonoscopy (04/2018); Inguinal hernia repair (Left, 2014); pr lap,appendectomy (N/A, 5/31/2018); pr lap,surg,colectomy, partial, w/anast (Right, 6/21/2018); Appendectomy; pr office/outpt visit,procedure only (N/A, 10/17/2018); and Prostatectomy (N/A, 9/4/2019). Family History  This patient's family history includes Breast Cancer in his sister; Dementia in his sister; Diabetes in his brother, mother, and sister; Heart Disease in his brother; Kidney Disease in his father; Stroke in his brother and mother. Social History  Yasmin Cox  reports that he quit smoking about 28 years ago. He has a 38.00 pack-year smoking history. He has never used smokeless tobacco. He reports current alcohol use. He reports that he does not use drugs. Subjective:      Review of Systems   Constitutional: Negative for activity change, appetite change, chills, diaphoresis, fatigue, fever and unexpected weight change. Gastrointestinal: Negative for abdominal pain, nausea and vomiting. Genitourinary: Negative for decreased urine volume, difficulty urinating, dysuria, flank pain, frequency, hematuria and urgency. Musculoskeletal: Negative for back pain. Objective:   /78   Ht 5' 7\" (1.702 m)   Wt 207 lb (93.9 kg)   BMI 32.42 kg/m²     Physical Exam  Vitals reviewed. Constitutional:       General: He is not in acute distress. Appearance: Normal appearance. He is well-developed. He is not ill-appearing or diaphoretic. HENT:      Head: Normocephalic and atraumatic. Right Ear: External ear normal.      Left Ear: External ear normal.      Nose: Nose normal.      Mouth/Throat:      Mouth: Mucous membranes are moist.   Eyes:      General: No scleral icterus. Right eye: No discharge. Left eye: No discharge. Neck:      Vascular: No JVD. Trachea: No tracheal deviation. Cardiovascular:      Rate and Rhythm: Normal rate and regular rhythm. Pulses: Normal pulses. Heart sounds: Normal heart sounds. Pulmonary:      Effort: Pulmonary effort is normal. No respiratory distress. Breath sounds: Normal breath sounds. Abdominal:      General: There is no distension. Tenderness: There is no abdominal tenderness. There is no right CVA tenderness or left CVA tenderness. Musculoskeletal:         General: Normal range of motion. Neurological:      Mental Status: He is alert and oriented to person, place, and time. Mental status is at baseline. Psychiatric:         Mood and Affect: Mood normal.         Behavior: Behavior normal.         Thought Content: Thought content normal.         POC  Results for POC orders placed in visit on 08/12/21   POCT Urinalysis No Micro (Auto)   Result Value Ref Range    Glucose, Ur Negative NEGATIVE mg/dl    Bilirubin Urine Negative     Ketones, Urine Negative NEGATIVE    Specific Gravity, Urine 1.015 1.002 - 1.030    Blood, UA POC Trace-lysed NEGATIVE    pH, Urine 7.00 5.0 - 9.0    Protein, Urine Negative NEGATIVE mg/dl    Urobilinogen, Urine 0.20 0.0 - 1.0 eu/dl    Nitrite, Urine Negative NEGATIVE    Leukocyte Clumps, Urine Negative NEGATIVE    Color, Urine Yellow YELLOW-STRAW    Character, Urine Clear CLR-SL.CLOUD         Patients recent PSA values are as follows  Lab Results   Component Value Date    PSA <0.02 08/03/2021    PSA <0.02 02/04/2021    PSA <0.02 08/04/2020        Recent BUN/Creatinine:  Lab Results   Component Value Date    BUN 14 08/25/2020    CREATININE 1.1 02/24/2021    CREATININE 1.3 02/20/2020     FINAL DIAGNOSIS:   A. Soft tissue, right pelvic region, excisional biopsy:    Benign fibroadipose tissue with infarct.    B. Prostate, radical prostatectomy:    Invasive, moderately differentiated adenocarcinoma.    Abel score = 7 (4+3), grade group = 3.    Maximum tumor size =1.4 cm    Extraprostatic extension, right posterior, 20-30 mm.    Perineural invasion    pT3a, pN0. Seminal vesicles, resections:    Negative for neoplasm. C. Right pelvic lymph nodes, resections:    Negative for neoplasm in 6 out of 6 lymph nodes. D. Left pelvic lymph node negative for neoplasm in, resection:    Negative for neoplasm in one out of one lymph node.     Assessment:   Prostate cancer  Erectile dysfunction  Hx of appendiceal ca    Plan:   PSA remains undetectable indicating control of prostate cancer at this time. Pt had extraprostatic extension and high risk decipher score preop.   Discussed consideration of rad/onc referral and Ray Edison and spouse prefers to await rising PSA before referral.      F/u in 1 year with PSA every 6 months

## 2021-08-13 ENCOUNTER — TELEPHONE (OUTPATIENT)
Dept: UROLOGY | Age: 81
End: 2021-08-13

## 2021-08-13 NOTE — TELEPHONE ENCOUNTER
----- Message from SERVANDO Munoz CNP sent at 8/13/2021  7:21 AM EDT -----  Please let pt know his urine microscopy was negative for blood.

## 2021-08-13 NOTE — TELEPHONE ENCOUNTER
[facility] on [facility] advised microscopic urine was negative for blood. She voiced understanding.

## 2021-11-23 ENCOUNTER — HOSPITAL ENCOUNTER (OUTPATIENT)
Dept: GENERAL RADIOLOGY | Age: 81
Discharge: HOME | End: 2021-11-23
Payer: MEDICARE

## 2021-11-23 ENCOUNTER — HOSPITAL ENCOUNTER (OUTPATIENT)
Age: 81
Discharge: HOME | End: 2021-11-23
Payer: MEDICARE

## 2021-11-23 DIAGNOSIS — R05.9 COUGH: ICD-10-CM

## 2021-11-23 PROCEDURE — 71046 X-RAY EXAM CHEST 2 VIEWS: CPT

## 2022-01-27 ENCOUNTER — OFFICE VISIT (OUTPATIENT)
Dept: CARDIOLOGY CLINIC | Age: 82
End: 2022-01-27
Payer: MEDICARE

## 2022-01-27 VITALS
SYSTOLIC BLOOD PRESSURE: 130 MMHG | HEIGHT: 70 IN | DIASTOLIC BLOOD PRESSURE: 62 MMHG | WEIGHT: 212 LBS | BODY MASS INDEX: 30.35 KG/M2 | HEART RATE: 100 BPM

## 2022-01-27 DIAGNOSIS — I48.91 ATRIAL FIBRILLATION, UNSPECIFIED TYPE (HCC): Primary | ICD-10-CM

## 2022-01-27 DIAGNOSIS — I10 ESSENTIAL HYPERTENSION: ICD-10-CM

## 2022-01-27 PROCEDURE — G8484 FLU IMMUNIZE NO ADMIN: HCPCS | Performed by: INTERNAL MEDICINE

## 2022-01-27 PROCEDURE — 4040F PNEUMOC VAC/ADMIN/RCVD: CPT | Performed by: INTERNAL MEDICINE

## 2022-01-27 PROCEDURE — 99212 OFFICE O/P EST SF 10 MIN: CPT | Performed by: INTERNAL MEDICINE

## 2022-01-27 PROCEDURE — 93000 ELECTROCARDIOGRAM COMPLETE: CPT | Performed by: INTERNAL MEDICINE

## 2022-01-27 PROCEDURE — G8417 CALC BMI ABV UP PARAM F/U: HCPCS | Performed by: INTERNAL MEDICINE

## 2022-01-27 PROCEDURE — G8427 DOCREV CUR MEDS BY ELIG CLIN: HCPCS | Performed by: INTERNAL MEDICINE

## 2022-01-27 PROCEDURE — 1123F ACP DISCUSS/DSCN MKR DOCD: CPT | Performed by: INTERNAL MEDICINE

## 2022-01-27 PROCEDURE — 1036F TOBACCO NON-USER: CPT | Performed by: INTERNAL MEDICINE

## 2022-01-27 NOTE — PROGRESS NOTES
1 year follow-up. He denies having any complaints. EKG completed. Wife states patient is having some memory issues and wondering if patient can start prevagen.

## 2022-01-27 NOTE — PROGRESS NOTES
[address] [facility] [address] [place] [address]  Dept: [phone]  Dept Fax: [phone]  Loc: [phone]    Visit Date: 1/27/2022    Mr. Reyes Starch is a 79 y.o. male  who presented for:  Chief Complaint   Patient presents with    1 Year Follow Up    Atrial Fibrillation       HPI:   HPI   78 yo M hx of Afib/flutter on Xarelto who presents for follow-up. 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 No CVA, no bleeding. BP stable. No chest pain, angina, BARROSO, orthopnea, PND, sob at rest, palpitations, LE edema, or syncope. Current Outpatient Medications:     sildenafil (VIAGRA) 100 MG tablet, Take 1 tablet by mouth as needed for Erectile Dysfunction (Take 3 times weekly prn for erectile dysfunction.), Disp: 30 tablet, Rfl: 1    rivaroxaban (XARELTO) 20 MG TABS tablet, Take 20 mg by mouth Daily with supper, Disp: , Rfl:     FREESTYLE LITE strip, USE 1 STRIP TO CHECK GLUCOSE ONCE DAILY AS DIRECTED, Disp: , Rfl: 3    Cholecalciferol (VITAMIN D3) 2000 units CAPS, Take 1 capsule by mouth 2 times daily , Disp: , Rfl:     hydrochlorothiazide (HYDRODIURIL) 25 MG tablet, Take 12.5 mg by mouth daily , Disp: , Rfl:     losartan (COZAAR) 50 MG tablet, Take 25 mg by mouth every morning , Disp: , Rfl:     simvastatin (ZOCOR) 40 MG tablet, Take 40 mg by mouth nightly , Disp: , Rfl:     Past Medical History  Yovany Sadler  has a past medical history of Atrial flutter ([facility] [address] Diabetes mellitus ([facility] [address] History of kidney stones, Hyperlipidemia, and Hypertension. Social History  Yovany Sadler  reports that he quit smoking about 29 years ago. He has a 38.00 pack-year smoking history. He has never used smokeless tobacco. He reports current alcohol use. He reports that he does not use drugs. Family History  Yovany Sadler family history includes Breast Cancer in his sister; Dementia in his sister; Diabetes in his brother, mother, and sister;  Heart Disease in his brother; Kidney Disease in his father; Stroke in his brother and mother. There is no family history of bicuspid aortic valve, aneurysms, heart transplant, pacemakers, defibrillators, or sudden cardiac death. Past Surgical History   Past Surgical History:   Procedure Laterality Date    APPENDECTOMY      COLONOSCOPY  04/2018    Dr. Kristina Meza Left 2014    \[facility] [place] [facility]\"" [facility]    LITHOTRIPSY      LA LAP,APPENDECTOMY N/A 5/31/2018    APPENDECTOMY LAPAROSCOPIC performed by Isabelle Shetty MD at [address], PARTIAL, W/ANAST Right 6/21/2018    ROBOTIC RIGHT COLECTOMY performed by Isabelle Shetty MD at [address] OFFICE/OUTPT [address] [place] [address] N/A 10/17/2018    CYSTO, LEFT URETEROSCOPY, LASER LITHOTRIPSY, BASKET RETRIEVAL OF STONES, STENT PLACEMENT performed by Reji Phillips MD at [address] [place] [address] N/A 9/4/2019    ROBOT ASSISTED LAPAROSCOPIC RADICAL PROSTATECTOMY performed by Reji Phillips MD at [facility] Bilateral        Review of Systems   Constitutional: Negative for chills and fever  HENT: Negative for congestion, sinus pressure, sneezing and sore throat. Eyes: Negative for pain, discharge, redness and itching. Respiratory: Negative for apnea, cough  Gastrointestinal: Negative for blood in stool, constipation, diarrhea   Endocrine: Negative for cold intolerance, heat intolerance, polydipsia. Genitourinary: Negative for dysuria, enuresis, flank pain and hematuria. Musculoskeletal: Negative for arthralgias, joint swelling and neck pain. Neurological: Negative for numbness and headaches. Psychiatric/Behavioral: Negative for agitation, confusion, decreased concentration and dysphoric mood.      Objective:     /62   Pulse 100   Ht 5' 10\" (1.778 m)   Wt 212 lb (96.2 kg)   BMI 30.42 kg/m²     Wt Readings from Last 3 Encounters:   01/27/22 212 lb (96.2 kg)   08/12/21 207 lb (93.9 kg)   02/24/21 212 lb 9.6 oz (96.4 kg)     BP Readings from Last 3 Encounters:   01/27/22 130/62   08/12/21 138/78   02/24/21 (!) 148/68       Nursing note and vitals reviewed. Physical Exam   Constitutional: Oriented to person, place, and time. Appears well-developed and well-nourished. HENT:   Head: Normocephalic and atraumatic. Eyes: EOM are normal. Pupils are equal, round, and reactive to light. Neck: Normal range of motion. Neck supple. No JVD present. Cardiovascular: Normal rate, regular rhythm, normal heart sounds and intact distal pulses. No murmur heard. Pulmonary/Chest: Effort normal and breath sounds normal. No respiratory distress. No wheezes. No rales. Abdominal: Soft. Bowel sounds are normal. No distension. There is no tenderness. Musculoskeletal: Normal range of motion. No edema. Neurological: Alert and oriented to person, place, and time. No cranial nerve deficit. Coordination normal.   Skin: Skin is warm and dry. Psychiatric: Normal mood and affect.        No results found for: CKTOTAL, CKMB, CKMBINDEX    Lab Results   Component Value Date    WBC 6.8 02/24/2021    RBC 4.39 02/24/2021    HGB 13.2 02/24/2021    HCT 40.6 02/24/2021    MCV 93 02/24/2021    MCH 30.1 02/24/2021    MCHC 32.5 02/24/2021    RDW 13.3 02/24/2021     02/24/2021    MPV 9.3 02/24/2021       Lab Results   Component Value Date     02/24/2021     09/05/2019    K 4.1 02/24/2021    K 4.2 09/05/2019    K 3.3 06/22/2018     09/05/2019    CO2 25 08/25/2020    BUN 14 08/25/2020    LABALBU 4.0 02/24/2021    CREATININE 1.1 02/24/2021    CREATININE 1.3 02/20/2020    CALCIUM 8.5 09/05/2019    LABGLOM 64 09/05/2019    GLUCOSE 165 09/05/2019       Lab Results   Component Value Date    ALKPHOS 50 02/24/2021    ALT 29 02/24/2021    AST 26 02/24/2021    PROT 7.8 02/24/2021    BILITOT 0.3 02/24/2021    BILIDIR <0.2 02/24/2021    LABALBU 4.0 02/24/2021       Lab Results   Component Value Date    MG 1.9 06/22/2018 No results found for: INR, PROTIME      No results found for: LABA1C    No results found for: TRIG, HDL, LDLCALC, LDLDIRECT, LABVLDL    No results found for: TSH      Testing Reviewed:      I have individually reviewed the cardiac test below:    ECHO: Results for orders placed during the hospital encounter of 07/15/20    ECHO Complete 2D W Doppler W Color    Narrative  Transthoracic Echocardiography Report (TTE)    Demographics    Patient Name   Abiodun Vance  Gender              Male  T    MR #           [id number]       Race                    Ethnicity    Account #      [de-identified]       Room Number    Accession      [id number]       Date of Study       07/15/2020  Number    Date of Birth  1940      Referring Physician Avis Castellanos, GIANCARLO    Age            79 year(s)      Christopher Pepe MD  Physician    Procedure    Type of Study    TTE procedure:ECHOCARDIOGRAM COMPLETE 2D W DOPPLER W COLOR. Procedure Date  Date: 07/15/2020 Start: 09:30 AM    Study Location: Echo Lab  Technical Quality: Limited visualization due to poor acoustical window. Indications:Atrial fibrillation. Additional Medical History: Former smoker, prostate cancer, dyspnea on  exertion, diabetes, hypertension, hyperlipidemia    Patient Status: Routine    Height: 70 inches Weight: 208 pounds BSA: 2.12 m^2 BMI: 29.85 kg/m^2    BP: 154/78 mmHg    Conclusions    Summary  Technically difficult examination. Ejection fraction was estimated at 60-65%. There was trace mitral regurgitation. Ascending aorta = 3.5 cm. IVC size is within normal limits with normal respiratory phasic changes.     Signature    ----------------------------------------------------------------  Electronically signed by Avis Ny MD (Interpreting  physician) on 07/15/2020 at 05:55 PM  ----------------------------------------------------------------    Findings    Mitral Valve  The mitral valve structure was normal with normal leaflet separation. DOPPLER: The transmitral velocity was within the normal range with no  evidence for mitral stenosis. There was trace mitral regurgitation. Aortic Valve  The aortic valve was trileaflet with normal thickness and cuspal  separation. DOPPLER: Transaortic velocity was within the normal range with  no evidence of aortic stenosis. There was no evidence of aortic  regurgitation. Tricuspid Valve  The tricuspid valve structure was normal with normal leaflet separation. DOPPLER: There was no evidence of tricuspid stenosis. There was no  evidence of tricuspid regurgitation. Pulmonic Valve  The pulmonic valve leaflets were not well seen. DOPPLER: The transpulmonic  velocity was within the normal range with no evidence for regurgitation. Left Atrium  Left atrial size was normal.    Left Ventricle  Normal left ventricular size and systolic function. There were no regional wall motion abnormalities. Wall thickness was within normal limits. Ejection fraction was estimated at 60-65%. Right Atrium  Right atrial size was normal.    Right Ventricle  The right ventricular size was normal with normal systolic function and  wall thickness. Pericardial Effusion  The pericardium was normal in appearance with no evidence of a pericardial  effusion. Pleural Effusion  No evidence of pleural effusion. Aorta / Great Vessels  -Ascending aorta = 3.5 cm. -IVC size is within normal limits with normal respiratory phasic changes.     M-Mode/2D Measurements & Calculations    LV Diastolic   LV Systolic Dimension:    AV Cusp Separation: 2.3 cmLA  Dimension: 4.4 2.9 cm                    Dimension: 3.9 cmAO Root  cm             LV Volume Diastolic: 85.9 Dimension: 3.4 cmLA Area: 13.4  LV FS:34.1 %   ml                        cm^2  LV PW          LV Volume Systolic: 57.9  Diastolic: 1.1 ml  cm             LV EDV/LV EDV Index: 87.7  Septum         ml/41 m^2LV ESV/LV ESV    RV Diastolic Dimension: 2.9 cm  Diastolic: 1   Index: 75.5 ml/15 m^2  cm             EF Calculated: 63.3 %     LA/Aorta: 1.15  Ascending Aorta: 3.5 cm  LA volume/Index: 31.9 ml /15m^2    Doppler Measurements & Calculations    MV Peak E-Wave: 110 AV Peak Velocity:     LVOT Peak Velocity: 71.9 cm/s  cm/s                98.6 cm/s             LVOT Mean Velocity: 48.8 cm/s  AV Peak Gradient:     LVOT Peak Gradient: 2 mmHgLVOT  MV Peak Gradient:   3.89 mmHg             Mean Gradient: 1 mmHg  4.84 mmHg           AV Mean Velocity:  77.1 cm/s             TV Peak E-Wave: 56.1 cm/s  MV Deceleration     AV Mean Gradient: 3  Time: 145 msec      mmHg                  TV Peak Gradient: 1.26 mmHg  MV P1/2t: 43 msec   AV VTI: 21.2 cm       TR Velocity:191 cm/s  MVA by PHT:5.12                           TR Gradient:14.59 mmHg  cm^2                                      PV Peak Velocity: 60.8 cm/s  LVOT VTI: 13.9 cm     PV Peak Gradient: 1.48 mmHg  IVRT: 63 msec    MR Velocity: 413    AV DVI (VTI): 0.66AV  cm/s                DVI (Vmax):0.73    [email or web address] [id number]       Assessment/Plan   Atrial flutter/fib, [id number] = 3  Preserved EF, 7/2020  [place] - hearing aids  Progressive dementia  Erectile Dysfunction - discussed Viagara use and safety precautions  BP stable, no falls, no bleeding, tolerating OAC. Discussed diet/exercise/BP/weigh loss/health lifestyle choices/lipids; the patient understands the goals and will try to comply.     Disposition:  1 year         Electronically signed by Uri Gomes MD   1/27/2022 at 9:59 AM EST

## 2022-02-02 ENCOUNTER — HOSPITAL ENCOUNTER (OUTPATIENT)
Age: 82
Discharge: HOME | End: 2022-02-02
Payer: MEDICARE

## 2022-02-02 DIAGNOSIS — C61 PROSTATE CANCER (HCC): ICD-10-CM

## 2022-02-02 LAB — PROSTATE SPECIFIC ANTIGEN: < 0.02 NG/ML (ref 0–1)

## 2022-02-02 PROCEDURE — 36415 COLL VENOUS BLD VENIPUNCTURE: CPT

## 2022-02-02 PROCEDURE — 84153 ASSAY OF PSA TOTAL: CPT

## 2022-02-25 ENCOUNTER — HOSPITAL ENCOUNTER (OUTPATIENT)
Dept: INFUSION THERAPY | Age: 82
Discharge: HOME | End: 2022-02-25
Payer: MEDICARE

## 2022-02-25 ENCOUNTER — OFFICE VISIT (OUTPATIENT)
Dept: ONCOLOGY | Age: 82
End: 2022-02-25
Payer: MEDICARE

## 2022-02-25 VITALS
HEART RATE: 101 BPM | DIASTOLIC BLOOD PRESSURE: 77 MMHG | BODY MASS INDEX: 31.12 KG/M2 | TEMPERATURE: 97.7 F | RESPIRATION RATE: 18 BRPM | WEIGHT: 217.4 LBS | HEIGHT: 70 IN | SYSTOLIC BLOOD PRESSURE: 161 MMHG

## 2022-02-25 VITALS
OXYGEN SATURATION: 97 % | HEART RATE: 101 BPM | RESPIRATION RATE: 18 BRPM | SYSTOLIC BLOOD PRESSURE: 161 MMHG | TEMPERATURE: 97.7 F | DIASTOLIC BLOOD PRESSURE: 77 MMHG | BODY MASS INDEX: 31.12 KG/M2 | WEIGHT: 217.4 LBS | HEIGHT: 70 IN

## 2022-02-25 DIAGNOSIS — C61 PROSTATE CANCER (HCC): ICD-10-CM

## 2022-02-25 DIAGNOSIS — Z90.49 S/P RIGHT COLECTOMY: ICD-10-CM

## 2022-02-25 DIAGNOSIS — C18.1 CANCER OF APPENDIX (HCC): Primary | ICD-10-CM

## 2022-02-25 DIAGNOSIS — Z08 ENCOUNTER FOR FOLLOW-UP SURVEILLANCE OF COLON CANCER: ICD-10-CM

## 2022-02-25 DIAGNOSIS — Z85.038 ENCOUNTER FOR FOLLOW-UP SURVEILLANCE OF COLON CANCER: ICD-10-CM

## 2022-02-25 DIAGNOSIS — C18.1 CANCER OF APPENDIX (HCC): ICD-10-CM

## 2022-02-25 LAB — CEA: 1.8 NG/ML (ref 0–5)

## 2022-02-25 PROCEDURE — 99211 OFF/OP EST MAY X REQ PHY/QHP: CPT

## 2022-02-25 PROCEDURE — 99213 OFFICE O/P EST LOW 20 MIN: CPT | Performed by: INTERNAL MEDICINE

## 2022-02-25 PROCEDURE — G8484 FLU IMMUNIZE NO ADMIN: HCPCS | Performed by: INTERNAL MEDICINE

## 2022-02-25 PROCEDURE — 36415 COLL VENOUS BLD VENIPUNCTURE: CPT

## 2022-02-25 PROCEDURE — G8417 CALC BMI ABV UP PARAM F/U: HCPCS | Performed by: INTERNAL MEDICINE

## 2022-02-25 PROCEDURE — 1036F TOBACCO NON-USER: CPT | Performed by: INTERNAL MEDICINE

## 2022-02-25 PROCEDURE — 4040F PNEUMOC VAC/ADMIN/RCVD: CPT | Performed by: INTERNAL MEDICINE

## 2022-02-25 PROCEDURE — 82378 CARCINOEMBRYONIC ANTIGEN: CPT

## 2022-02-25 PROCEDURE — 1123F ACP DISCUSS/DSCN MKR DOCD: CPT | Performed by: INTERNAL MEDICINE

## 2022-02-25 PROCEDURE — G8427 DOCREV CUR MEDS BY ELIG CLIN: HCPCS | Performed by: INTERNAL MEDICINE

## 2022-02-25 ASSESSMENT — ENCOUNTER SYMPTOMS
BLOOD IN STOOL: 0
VOMITING: 0
NAUSEA: 0
ABDOMINAL DISTENTION: 0
EYE DISCHARGE: 0
SHORTNESS OF BREATH: 0
DIARRHEA: 0
COUGH: 0
ABDOMINAL PAIN: 0
CHEST TIGHTNESS: 0
SORE THROAT: 0
BACK PAIN: 0
RECTAL PAIN: 0
FACIAL SWELLING: 0
WHEEZING: 0
CONSTIPATION: 0
COLOR CHANGE: 0
TROUBLE SWALLOWING: 0

## 2022-02-25 NOTE — PROGRESS NOTES
[facility] PROFESSIONAL SERVS  [facility]  [address] [facility] [address]  [address] [facility] [address]  [address] [address]  Dept: [phone]  Dept Fax: [phone]: [phone]     Visit Date: 2/25/2022     Annemarie Wallace is a 79 y.o. male who presents today for:   Chief Complaint   Patient presents with    Follow-up     Cancer of appendix        HPI:   This is an 79-year-old patient with a history of  low-grade appendiceal mucinous neoplasm, pTis(LAMN). He underwent colonoscopy demonstrated  few tubular adenoma polyps and enlarged swollen appendiceal orifice.  Therefore he had CT of the abdomen on April 23, 2018 that showed:  1. Siria Arellano is diffuse dilatation of the appendix which measures 1.8 cm   in diameter. There is no additional evidence for acute appendicitis. There is no periappendiceal edema or appendicolith. 2.  Nonobstructing calculus in the lower pole of the left kidney. 3.  Mild enlargement of the prostate gland with mild diffuse   thickening of the wall of the urinary bladder which may represent   bladder out obstruction. 4.  Calcified granuloma in the left lung base. 5.  Moderate atherosclerosis of the abdominal aorta and iliac   arteries. 6.  Grade 1 anterolisthesis of L4 on L5.   7.  Severe degenerative disc disease at L5-S1. He underwent diagnostic laparoscopy with appendectomy on May 31, 2018. Pathology demonstrated:   Low-grade appendiceal mucinous neoplasm, pTis(LAMN).   Proximal margin focally involved by low-grade mucinous appendiceal neoplasm. Due to positive margin on the June 21, 2018 he underwent right colon resection:   Negative for malignancy.   Pericolonic lymph nodes (12): Negative for malignancy. His postsurgical course was uncomplicated. In July 2019 he was diagnosed with prostate cancer and on September 4, 2019 he underwent  radical prostatectomy with lymph node dissection. Final pathology report showed:   A.  Soft tissue, right pelvic region, excisional biopsy:   Benign fibroadipose tissue with infarct. B. Prostate, radical prostatectomy:   Invasive, moderately differentiated adenocarcinoma.   [place] score = 7 (4+3), grade group = 3.   Maximum tumor size =1.4 cm   Extraprostatic extension, right posterior, 20-30 mm.   Perineural invasion   pT3a, pN0. Seminal vesicles, resections:   Negative for neoplasm. C. Right pelvic lymph nodes, resections:   Negative for neoplasm in 6 out of 6 lymph nodes. D. Left pelvic lymph node negative for neoplasm in, resection:   Negative for neoplasm in one out of one lymph node. Interim history on 02/25/2022:   Presents to the medical oncology clinic for 6 months follow-up visit. He denies having abdominal pain. No difficulty with bowel movements. His appetite is fine and his weight is stable. He denies any recent hospitalizations. He is under urologic care for his prostate cancer, his recent PSA was undetectable  Remaining 12 point review of system is negative.     HPI   Past Medical History:   Diagnosis Date    Atrial flutter ([address]     Diabetes mellitus ([address]     diet and exercise controlled    History of kidney stones 1996    Hyperlipidemia     Hypertension       Past Surgical History:   Procedure Laterality Date    APPENDECTOMY      COLONOSCOPY  04/2018    Dr. Glynn Sensor Left 2014    [place], [place]    LITHOTRIPSY      [place] LAP,APPENDECTOMY N/A 5/31/2018    APPENDECTOMY LAPAROSCOPIC performed by Malcom Moseley MD at [place] [address], PARTIAL, W/ANAST Right 6/21/2018    ROBOTIC RIGHT COLECTOMY performed by Malcom Moseley MD at [address] [facility] OFFICE/OUTPT [address] [facility] N/A 10/17/2018    CYSTO, LEFT URETEROSCOPY, LASER LITHOTRIPSY, BASKET RETRIEVAL OF STONES, STENT PLACEMENT performed by Barron Galindo MD at [address] [facility] 9/4/2019    ROBOT ASSISTED LAPAROSCOPIC RADICAL PROSTATECTOMY performed by Barron Galindo MD at [address] [facility] RETINAL DETACHMENT SURGERY Bilateral       Family History   Problem Relation Age of Onset    Diabetes Mother     Stroke Mother     Kidney Disease Father     Diabetes Sister     Breast Cancer Sister     Dementia Sister     Heart Disease Brother     Diabetes Brother     Stroke Brother       Social History     Tobacco Use    Smoking status: Former Smoker     Packs/day: 1.00     Years: 38.00     Pack years: 38.00     Quit date:      Years since quittin.1    Smokeless tobacco: Never Used   Substance Use Topics    Alcohol use: Yes     Comment: socially beer on weekends      Current Outpatient Medications   Medication Sig Dispense Refill    Cobalamin Combinations (NEURIVA PLUS PO) Take 1 each by mouth daily      sildenafil (VIAGRA) 100 MG tablet Take 1 tablet by mouth as needed for Erectile Dysfunction (Take 3 times weekly prn for erectile dysfunction.) 30 tablet 1    rivaroxaban (XARELTO) 20 MG TABS tablet Take 20 mg by mouth Daily with supper      FREESTYLE LITE strip USE 1 STRIP TO CHECK GLUCOSE ONCE DAILY AS DIRECTED  3    Cholecalciferol (VITAMIN D3) 2000 units CAPS Take 1 capsule by mouth 2 times daily       hydrochlorothiazide (HYDRODIURIL) 25 MG tablet Take 12.5 mg by mouth daily       losartan (COZAAR) 50 MG tablet Take 25 mg by mouth every morning       simvastatin (ZOCOR) 40 MG tablet Take 40 mg by mouth nightly        No current facility-administered medications for this visit.       Allergies   Allergen Reactions    Lisinopril Other (See Comments)     coughing  Other reaction(s): Cough      Health Maintenance   Topic Date Due    Lipid screen  Never done    Depression Screen  Never done    DTaP/Tdap/Td vaccine (1 - Tdap) 1999    Shingles Vaccine (2 of 3) 2013    Annual Wellness Visit (AWV)  Never done    Potassium monitoring  2020    Creatinine monitoring  2021    PSA counseling  2023    Flu vaccine  Completed    Pneumococcal 65+ years Vaccine Completed    COVID-19 Vaccine  Completed    Hepatitis A vaccine  Aged Out    Hepatitis B vaccine  Aged Out    Hib vaccine  Aged Out    Meningococcal (ACWY) vaccine  Aged Out        Subjective:   Review of Systems   Constitutional: Negative for activity change, appetite change, fatigue and fever. HENT: Negative for congestion, dental problem, facial swelling, hearing loss, mouth sores, nosebleeds, sore throat, tinnitus and trouble swallowing. Eyes: Negative for discharge and visual disturbance. Respiratory: Negative for cough, chest tightness, shortness of breath and wheezing. Cardiovascular: Negative for chest pain, palpitations and leg swelling. Gastrointestinal: Negative for abdominal distention, abdominal pain, blood in stool, constipation, diarrhea, nausea, rectal pain and vomiting. Endocrine: Negative for cold intolerance, polydipsia and polyuria. Genitourinary: Negative for decreased urine volume, difficulty urinating, dysuria, flank pain, hematuria and urgency. Musculoskeletal: Negative for arthralgias, back pain, gait problem, joint swelling, myalgias and neck stiffness. Skin: Negative for color change, rash and wound. Neurological: Negative for dizziness, tremors, seizures, speech difficulty, weakness, light-headedness, numbness and headaches. Hematological: Negative for adenopathy. Does not bruise/bleed easily. Psychiatric/Behavioral: Negative for confusion and sleep disturbance. The patient is not nervous/anxious. Objective:   Physical Exam  Vitals reviewed. Constitutional:       General: He is not in acute distress. Appearance: He is well-developed. HENT:      Head: Normocephalic. Mouth/Throat:      Pharynx: No oropharyngeal exudate. Eyes:      General: No scleral icterus. Right eye: No discharge. Left eye: No discharge. Pupils: Pupils are equal, round, and reactive to light. Neck:      Thyroid: No thyromegaly.       Vascular: No JVD.      Trachea: No tracheal deviation. Cardiovascular:      Rate and Rhythm: Normal rate. Heart sounds: Normal heart sounds. No murmur heard. No friction rub. No gallop. Pulmonary:      Effort: Pulmonary effort is normal. No respiratory distress. Breath sounds: Normal breath sounds. No stridor. No wheezing or rales. Chest:      Chest wall: No tenderness. Abdominal:      General: Bowel sounds are normal. There is no distension. Palpations: Abdomen is soft. There is no mass. Tenderness: There is no abdominal tenderness. There is no rebound. Musculoskeletal:         General: Normal range of motion. Cervical back: Normal range of motion and neck supple. Comments: Good range of motion in all four extremities. Lymphadenopathy:      Cervical: No cervical adenopathy. Skin:     General: Skin is warm. Findings: No erythema or rash. Neurological:      Mental Status: He is alert and oriented to person, place, and time. Cranial Nerves: No cranial nerve deficit. Motor: No abnormal muscle tone. Deep Tendon Reflexes: Reflexes are normal and symmetric. Psychiatric:         Behavior: Behavior normal.         Thought Content: Thought content normal.         Judgment: Judgment normal.        BP (!) 161/77 (Site: Left Upper Arm, Position: Sitting, Cuff Size: Medium Adult)   Pulse 101   Temp 97.7 °F (36.5 °C) (Oral)   Resp 18   Ht 5' 10\" (1.778 m)   Wt 217 lb 6.4 oz (98.6 kg)   SpO2 97%   BMI 31.19 kg/m²      ECOG status is 0.     Imaging studies and labs:     Lab Results   Component Value Date    WBC 6.8 02/24/2021    HGB 13.2 (L) 02/24/2021    HCT 40.6 (L) 02/24/2021    MCV 93 02/24/2021     02/24/2021       Chemistry        Component Value Date/Time     (L) 02/24/2021 1103     09/05/2019 0630    K 4.1 02/24/2021 1103    K 4.2 09/05/2019 0630    K 3.3 (L) 06/22/2018 0507     09/05/2019 0630    CO2 25 08/25/2020 1042    BUN 14 08/25/2020 1042    CREATININE 1.1 02/24/2021 1103    CREATININE 1.3 02/20/2020 0820        Component Value Date/Time    CALCIUM 8.5 09/05/2019 0630    ALKPHOS 50 02/24/2021 1103    AST 26 02/24/2021 1103    ALT 29 02/24/2021 1103    BILITOT 0.3 02/24/2021 1103        CEA:  December 2018:1.3  May 2019:1.4  November 2019: 1.5  February 2020: 1.5  August 2020: 1.3    CT of the abdomen on February 20, 2020 showed:  1. There is redemonstration of a fluid collection along the left lateral pelvic sidewall. This has decreased in size since prior exam and could be related to a postoperative lymphocele or seroma. The previously seen right-sided pelvic fluid collection    has resolved since the previous study. 2. Postsurgical changes involving the colon as well as the prostate gland. No evidence of recurrent disease. 3. The liver appears slightly hypoattenuated which could be related to fatty infiltration.         CT of the abdomen and pelvis on September 2, 2020 showed:  1. No acute intra-abdominal or intrapelvic findings. 2. No metastatic disease in the abdomen or pelvis. CT of the abdomen in March 2021 showed:  Stable CT scan abdomen and pelvis no evidence of progression of disease. Assessment/Plan: 1. Low-grade appendiceal mucinous neoplasm, pTis(LAMN) diagnosed in May 2018. Appendiceal mucinous neoplasms are a heterogeneous group of tumors. Treatment is based on stage and histology. Low-grade tumors are treated surgically with resection of the primary site in early stage disease, no further treatment is required after surgery. Appendiceal mucinous neoplasms sometimes present with peritoneal dissemination, however the patient did not have evidence of peritoneal dissemination. Radically resected LAMN, even with limited peritoneal spread, carries a low recurrence risk. Today, the patient denies having any complaints/symptoms.  Specifically no abdominal pain, no abdominal distention, no change in bowel movement pattern. His labs are within normal limits. His CEA is normal.  There is no evidence of disease recurrence on today's physical examination. His CT of the abdomen performed in March 2021 showed no evidence of recurrent or metastatic disease. His CEA today is 1.3. The patient will have CT of the abdomen and pelvis at the end of March 2021. Since he is almost 4 years from his diagnosis , we will change frequency of CT scan to once a year. Order is placed for CT of the abdomen in March 2022. I will see him now annually  2. Prostate cancer. PT3a, pN0. With extraprostatic extension (pT3a), local control tends to be a problem, particularly in the presence of positive margins. With extraprostatic extension (pT3a), local control tends to be more of a problem, more so with the presence of positive margins. The patient is currently followed by surveillance. His most recent PSA in February 2022 was below 0.02        Return in about 52 weeks (around 2/24/2023).      Orders Placed:   Orders Placed This Encounter   Procedures    CEA     Standing Status:   Future     Number of Occurrences:   1     Standing Expiration Date:   2/25/2023      Addendum on September 2, 2020:  CT of the abdomen and pelvis performed today showed no evidence of metastatic or recurrent disease in the abdomen or pelvis

## 2022-05-06 ENCOUNTER — OFFICE VISIT (OUTPATIENT)
Dept: PULMONOLOGY | Age: 82
End: 2022-05-06
Payer: MEDICARE

## 2022-05-06 ENCOUNTER — HOSPITAL ENCOUNTER (OUTPATIENT)
Age: 82
Discharge: HOME | End: 2022-05-06
Payer: MEDICARE

## 2022-05-06 VITALS
WEIGHT: 219.4 LBS | TEMPERATURE: 96.9 F | HEIGHT: 70 IN | SYSTOLIC BLOOD PRESSURE: 136 MMHG | DIASTOLIC BLOOD PRESSURE: 78 MMHG | HEART RATE: 87 BPM | BODY MASS INDEX: 31.41 KG/M2 | OXYGEN SATURATION: 97 %

## 2022-05-06 DIAGNOSIS — R04.2 COUGHING UP BLOOD: ICD-10-CM

## 2022-05-06 DIAGNOSIS — R59.0 LAD (LYMPHADENOPATHY), MEDIASTINAL: Primary | ICD-10-CM

## 2022-05-06 DIAGNOSIS — R91.8 LUNG NODULES: ICD-10-CM

## 2022-05-06 DIAGNOSIS — Z87.891 PERSONAL HISTORY OF SMOKING: ICD-10-CM

## 2022-05-06 LAB
CREAT SERPL-MCNC: 1.2 MG/DL (ref 0.4–1.2)
GFR SERPL CREATININE-BSD FRML MDRD: 58 ML/MIN/1.73M2

## 2022-05-06 PROCEDURE — 1123F ACP DISCUSS/DSCN MKR DOCD: CPT | Performed by: INTERNAL MEDICINE

## 2022-05-06 PROCEDURE — 1036F TOBACCO NON-USER: CPT | Performed by: INTERNAL MEDICINE

## 2022-05-06 PROCEDURE — G8427 DOCREV CUR MEDS BY ELIG CLIN: HCPCS | Performed by: INTERNAL MEDICINE

## 2022-05-06 PROCEDURE — 4040F PNEUMOC VAC/ADMIN/RCVD: CPT | Performed by: INTERNAL MEDICINE

## 2022-05-06 PROCEDURE — 36415 COLL VENOUS BLD VENIPUNCTURE: CPT

## 2022-05-06 PROCEDURE — 82565 ASSAY OF CREATININE: CPT

## 2022-05-06 PROCEDURE — 99204 OFFICE O/P NEW MOD 45 MIN: CPT | Performed by: INTERNAL MEDICINE

## 2022-05-06 PROCEDURE — G8417 CALC BMI ABV UP PARAM F/U: HCPCS | Performed by: INTERNAL MEDICINE

## 2022-05-06 ASSESSMENT — ENCOUNTER SYMPTOMS
COUGH: 1
STRIDOR: 0
SHORTNESS OF BREATH: 0
ALLERGIC/IMMUNOLOGIC NEGATIVE: 1
WHEEZING: 0
CHOKING: 0
EYES NEGATIVE: 1

## 2022-05-06 NOTE — PROGRESS NOTES
Subjective:      Patient ID: Stepan Cotton is a 79 y.o. male. CC[de-identified] Coughing up blood    HPI      Sai Castro comes with his wife who does most of the answering and redirects kayla. She clarifies the fact that Sai Castro did not cough up blood it was a mistake in their appreciation it was finally noted which was bleeding from his gums and has since stopping, he is on Xarelto. Reading referring provider's notes Sai Castro has coughed up blood more than one occasion has required Tessalon perles and Albuterol inhaler. No known lung disease smoked from age 12 to 61, quit in 1995. H/O appendiceal mucinous neoplasm s/p right colectomy 2018, a staging CT chest done at that time revealed \"emphysematous changes\" a follow up CT chest dated 11/6/2019 revealed \"Subcentimeter lung nodules appear unchanged from 2018, Indeterminate 1.1 x 1.2 cm mildly prominent subcarinal lymph node\". SKG tools--\"\" x 40 years    Afib, prostate cancer,  appendiceal mucinous neoplasm s/p colectomy, T2DM, HTN. Review of Systems   Constitutional: Positive for fatigue. HENT: Negative. Eyes: Negative. Respiratory: Positive for cough. Negative for choking, shortness of breath, wheezing and stridor. Coughing up blood. Cardiovascular: Negative for chest pain, palpitations and leg swelling. Endocrine: Negative. Genitourinary: Negative. Musculoskeletal: Positive for arthralgias. Allergic/Immunologic: Negative. Hematological: Negative. Psychiatric/Behavioral: Negative.           All other systems reviewed and are negative    Lung Nodule Screening     [] Qualifies    [x] Does not qualify   [] Declined   [] Completed  Past Medical History:   Diagnosis Date    Atrial flutter ([address]     Diabetes mellitus ([address]     diet and exercise controlled    History of kidney stones 1996    Hyperlipidemia     Hypertension      Past Surgical History:   Procedure Laterality Date    APPENDECTOMY      COLONOSCOPY  04/2018 Dr. Douglas [place] Left     [place], [place]    LITHOTRIPSY      [place] LAP,APPENDECTOMY N/A 2018    APPENDECTOMY LAPAROSCOPIC performed by Rodrigo Reed MD at [address], PARTIAL, W/ANAST Right 2018    ROBOTIC RIGHT COLECTOMY performed by Rodrigo Reed MD at [address] [facility] OFFICE/OUTPT [facility] N/A 10/17/2018    CYSTO, LEFT URETEROSCOPY, LASER LITHOTRIPSY, BASKET RETRIEVAL OF STONES, STENT PLACEMENT performed by Hannah Bautista MD at [address] [facility] 2019    ROBOT ASSISTED LAPAROSCOPIC RADICAL PROSTATECTOMY performed by Hannah Bautista MD at [address] Bilateral      Social History     Tobacco Use    Smoking status: Former Smoker     Packs/day: 1.00     Years: 38.00     Pack years: 38.00     Quit date:      Years since quittin.3    Smokeless tobacco: Never Used   Vaping Use    Vaping Use: Never used   Substance Use Topics    Alcohol use: Yes     Comment: socially beer on weekends    Drug use: No      Allergies   Allergen Reactions    Lisinopril Other (See Comments)     coughing  Other reaction(s): Cough      Family History   Problem Relation Age of Onset    Diabetes Mother     Stroke Mother     Kidney Disease Father     Diabetes Sister     Breast Cancer Sister     Dementia Sister     Heart Disease Brother     Diabetes Brother     Stroke Brother      Current Outpatient Medications   Medication Sig Dispense Refill    Cobalamin Combinations (NEURIVA PLUS PO) Take 1 each by mouth daily      sildenafil (VIAGRA) 100 MG tablet Take 1 tablet by mouth as needed for Erectile Dysfunction (Take 3 times weekly prn for erectile dysfunction.) 30 tablet 1    rivaroxaban (XARELTO) 20 MG TABS tablet Take 20 mg by mouth Daily with supper      FREESTYLE LITE strip USE 1 STRIP TO CHECK GLUCOSE ONCE DAILY AS DIRECTED  3    Cholecalciferol (VITAMIN D3) 2000 units CAPS Take 1 capsule by mouth 2 times daily       hydrochlorothiazide (HYDRODIURIL) 25 MG tablet Take 12.5 mg by mouth daily       losartan (COZAAR) 50 MG tablet Take 25 mg by mouth every morning       simvastatin (ZOCOR) 40 MG tablet Take 40 mg by mouth nightly        No current facility-administered medications for this visit. LABS - none    There were no vitals taken for this visit. Wt Readings from Last 3 Encounters:   02/25/22 217 lb 6.4 oz (98.6 kg)   02/25/22 217 lb 6.4 oz (98.6 kg)   01/27/22 212 lb (96.2 kg)     Neck Circumference - 15.50 in; Mallampati Score - 3        Objective:   Physical Exam  Vitals and nursing note reviewed. Constitutional:       General: He is not in acute distress. Appearance: He is well-developed. He is not diaphoretic. HENT:      Head: Normocephalic and atraumatic. Right Ear: External ear normal.      Left Ear: External ear normal.      Nose: Nose normal.   Eyes:      General: No scleral icterus. Pupils: Pupils are equal, round, and reactive to light. Neck:      Thyroid: No thyromegaly. Vascular: No JVD. Trachea: No tracheal deviation. Cardiovascular:      Rate and Rhythm: Normal rate and regular rhythm. Heart sounds: Normal heart sounds. No murmur heard. No friction rub. No gallop. Pulmonary:      Effort: Pulmonary effort is normal. No respiratory distress. Breath sounds: Normal breath sounds. No stridor. No wheezing or rales. Chest:      Chest wall: No tenderness. Abdominal:      General: Bowel sounds are normal. There is no distension. Palpations: Abdomen is soft. There is no mass. Tenderness: There is no abdominal tenderness. There is no guarding or rebound. Musculoskeletal:         General: No tenderness. Normal range of motion. Cervical back: Normal range of motion and neck supple. Lymphadenopathy:      Cervical: No cervical adenopathy. Skin:     General: Skin is warm and dry. Coloration: Skin is not pale. Findings: No erythema or rash. Neurological:      Mental Status: He is alert and oriented to person, place, and time. Cranial Nerves: No cranial nerve deficit. Coordination: Coordination normal.   Psychiatric:         Thought Content: Thought content normal.         FINDINGS:        There is a 3 minor pulmonary nodule within the posterior lateral left upper lobe on axial image 19 which appears unchanged from December 2018. This is seen on axial image 19.       The central airways appear patent.       Mild biapical pleural parenchymal scarring appears unchanged from prior examination.       There is a 5 mm pulmonary nodule within the medial aspect of the right midlung on axial image 44 which is unchanged from prior examination. This abuts the minor fissure.       There is a tiny approximately 1 mm pulmonary nodule within the posterior lateral right lower lobe on axial image 38. This appears unchanged from prior examination from December 2018.       There is a calcified granuloma within the left lower lobe.       Mild parenchymal bandlike scarring within the lingula is noted.       Mildly prominent lymph node within the subcarinal region measures 1.1 x 1.2 cm. This does not appear significantly changed from prior examination from December 2018.       There is a small pericardiophrenic lymph node demonstrated on axial image 49 which appears unchanged.       Limited evaluation of the upper abdomen appears unremarkable. CT of the abdomen and pelvis is reported separately.       No acute osseous findings are seen.       There is moderate partially calcified atheromatous chronic disease within the thoracic aorta.           Impression   1. Scattered indeterminate subcentimeter pulmonary nodules within the lungs bilaterally. These appear unchanged from December 2018. Recommend continued follow-up.       2. Indeterminate 1.1 x 1.2 cm mildly prominent lymph node within the subcarinal region.  This is unchanged from prior examination. Recommend continued follow-up.             **This report has been created using voice recognition software.  It may contain minor errors which are inherent in voice recognition technology. **       Final report electronically signed by Dr. Miguel Pollock on 11/6/2019 10:43 AM             Assessment:       Diagnosis Orders   1. LAD (lymphadenopathy), mediastinal  CT CHEST W CONTRAST    Creatinine    Full PFT Study With Bronchodilator   2. Lung nodules  CT CHEST W CONTRAST    Creatinine    Full PFT Study With Bronchodilator   3. Coughing up blood  CT CHEST W CONTRAST    Creatinine    Full PFT Study With Bronchodilator   4. Personal history of smoking  Full PFT Study With Bronchodilator           Plan:      Orders Placed This Encounter   Procedures    CT CHEST W CONTRAST     Standing Status:   Future     Standing Expiration Date:   5/6/2023     Order Specific Question:   STAT Creatinine as needed:     Answer: Yes    Creatinine     Standing Status:   Future     Standing Expiration Date:   5/6/2023      Ray Edison and wife believe it was bleeding from the gums related to Xarelto, I did not find any mouth lesions  Will review CT chest as soon as available and communicate over the phone if procedure is needed.     RTC 2 mos

## 2022-06-15 ENCOUNTER — HOSPITAL ENCOUNTER (OUTPATIENT)
Dept: CT IMAGING | Age: 82
Discharge: HOME | End: 2022-06-15
Payer: MEDICARE

## 2022-06-15 ENCOUNTER — HOSPITAL ENCOUNTER (OUTPATIENT)
Dept: PULMONOLOGY | Age: 82
Discharge: HOME | End: 2022-06-15
Payer: MEDICARE

## 2022-06-15 DIAGNOSIS — R04.2 COUGHING UP BLOOD: ICD-10-CM

## 2022-06-15 DIAGNOSIS — R59.0 LAD (LYMPHADENOPATHY), MEDIASTINAL: ICD-10-CM

## 2022-06-15 DIAGNOSIS — R91.8 LUNG NODULES: ICD-10-CM

## 2022-06-15 DIAGNOSIS — Z87.891 PERSONAL HISTORY OF SMOKING: ICD-10-CM

## 2022-06-15 LAB — POC CREATININE WHOLE BLOOD: 1.1 MG/DL (ref 0.5–1.2)

## 2022-06-15 PROCEDURE — 82565 ASSAY OF CREATININE: CPT

## 2022-06-15 PROCEDURE — 6360000004 HC RX CONTRAST MEDICATION: Performed by: INTERNAL MEDICINE

## 2022-06-15 PROCEDURE — 94726 PLETHYSMOGRAPHY LUNG VOLUMES: CPT

## 2022-06-15 PROCEDURE — 94060 EVALUATION OF WHEEZING: CPT

## 2022-06-15 PROCEDURE — 94729 DIFFUSING CAPACITY: CPT

## 2022-06-15 PROCEDURE — 71260 CT THORAX DX C+: CPT

## 2022-06-15 RX ADMIN — IOPAMIDOL 85 ML: 755 INJECTION, SOLUTION INTRAVENOUS at 10:55

## 2022-06-23 ENCOUNTER — OFFICE VISIT (OUTPATIENT)
Dept: PULMONOLOGY | Age: 82
End: 2022-06-23
Payer: MEDICARE

## 2022-06-23 VITALS
HEART RATE: 82 BPM | OXYGEN SATURATION: 95 % | DIASTOLIC BLOOD PRESSURE: 78 MMHG | SYSTOLIC BLOOD PRESSURE: 136 MMHG | WEIGHT: 215.6 LBS | HEIGHT: 70 IN | TEMPERATURE: 96.9 F | BODY MASS INDEX: 30.86 KG/M2

## 2022-06-23 DIAGNOSIS — J84.10 GRANULOMATOUS LUNG DISEASE (HCC): ICD-10-CM

## 2022-06-23 DIAGNOSIS — R04.2 HEMOPTYSIS: ICD-10-CM

## 2022-06-23 DIAGNOSIS — J44.9 STAGE 1 MILD COPD BY GOLD CLASSIFICATION (HCC): Primary | ICD-10-CM

## 2022-06-23 PROCEDURE — 1036F TOBACCO NON-USER: CPT | Performed by: INTERNAL MEDICINE

## 2022-06-23 PROCEDURE — 99214 OFFICE O/P EST MOD 30 MIN: CPT | Performed by: INTERNAL MEDICINE

## 2022-06-23 PROCEDURE — G8427 DOCREV CUR MEDS BY ELIG CLIN: HCPCS | Performed by: INTERNAL MEDICINE

## 2022-06-23 PROCEDURE — G8417 CALC BMI ABV UP PARAM F/U: HCPCS | Performed by: INTERNAL MEDICINE

## 2022-06-23 PROCEDURE — 1123F ACP DISCUSS/DSCN MKR DOCD: CPT | Performed by: INTERNAL MEDICINE

## 2022-06-23 PROCEDURE — 3023F SPIROM DOC REV: CPT | Performed by: INTERNAL MEDICINE

## 2022-06-23 ASSESSMENT — ENCOUNTER SYMPTOMS
ALLERGIC/IMMUNOLOGIC NEGATIVE: 1
SHORTNESS OF BREATH: 0
WHEEZING: 0
EYES NEGATIVE: 1
CHOKING: 0
COUGH: 1
STRIDOR: 0

## 2022-06-23 NOTE — PROGRESS NOTES
Neck Circumference -   15.5  Mallampati - 3    Lung Nodule Screening     [] Qualifies    [] Does not qualify   [] Declined    [] Completed

## 2022-06-23 NOTE — PROGRESS NOTES
Subjective:      Patient ID: La Ruffin is a 79 y.o. male. CC:     HPI     Comes with to review CT chest ordered due to hemoptysis   Has not coughed  up more blood since the last visit. C/o \"tickle in the throat\" which seems to respond to prn Albuterol  CT chest stable---granulomatous lung disease--similar to CT dated 11/6/2019 no evolving changes    Dieter Gilliam comes with his wife who does most of the answering and redirects kayla. She clarifies the fact that Dieter Gilliam did not cough up blood it was a mistake in their appreciation it was finally noted which was bleeding from his gums and has since stopping, he is on Xarelto. Reading referring provider's notes Dieter Gilliam has coughed up blood more than one occasion has required Tessalon perles and Albuterol inhaler. No known lung disease smoked from age 12 to 61, quit in 1995. H/O appendiceal mucinous neoplasm s/p right colectomy 2018, a staging CT chest done at that time revealed \"emphysematous changes\" a follow up CT chest dated 11/6/2019 revealed \"Subcentimeter lung nodules appear unchanged from 2018, Indeterminate 1.1 x 1.2 cm mildly prominent subcarinal lymph node\". SKG tools--\"\" x 40 years    Afib, prostate cancer,  appendiceal mucinous neoplasm s/p colectomy, T2DM, HTN. Review of Systems   Constitutional: Positive for fatigue. HENT: Negative. Eyes: Negative. Respiratory: Positive for cough. Negative for choking, shortness of breath, wheezing and stridor. Cardiovascular: Negative for chest pain, palpitations and leg swelling. Endocrine: Negative. Genitourinary: Negative. Musculoskeletal: Positive for arthralgias. Allergic/Immunologic: Negative. Hematological: Negative. Psychiatric/Behavioral: Negative.           All other systems reviewed and are negative    Lung Nodule Screening     [] Qualifies    [x] Does not qualify   [] Declined   [] Completed  Past Medical History:   Diagnosis Date    [facility] fl[facility] [address]     Diabetes mellitus ([facility] [address]     diet and exercise controlled    History of kidney stones     Hyperlipidemia     Hypertension      Past Surgical History:   Procedure Laterality Date    APPENDECTOMY      COLONOSCOPY  2018    Dr. Darcie Harris Left     [place], [place]    LITHOTRIPSY      IN LAP,APPENDECTOMY N/A 2018    APPENDECTOMY LAPAROSCOPIC performed by Phong Ennis MD at [facility], PARTIAL, W/ANAST Right 2018    ROBOTIC RIGHT COLECTOMY performed by Phong Ennis MD at [address] [facility] OFFICE/OUTPT [address] [facility] N/A 10/17/2018    CYSTO, LEFT URETEROSCOPY, LASER LITHOTRIPSY, BASKET RETRIEVAL OF STONES, STENT PLACEMENT performed by Ghanshyam Calvetr MD at [address] [facility] 2019    ROBOT ASSISTED LAPAROSCOPIC RADICAL PROSTATECTOMY performed by Ghanshyam Calvert MD at [facility] Bilateral      Social History     Tobacco Use    Smoking status: Former Smoker     Packs/day: 1.00     Years: 38.00     Pack years: 38.00     Quit date:      Years since quittin.4    Smokeless tobacco: Never Used   Vaping Use    Vaping Use: Never used   Substance Use Topics    Alcohol use: Yes     Comment: socially beer on weekends    Drug use: No      Allergies   Allergen Reactions    Lisinopril Other (See Comments)     coughing  Other reaction(s): Cough      Family History   Problem Relation Age of Onset    Diabetes Mother     Stroke Mother     Kidney Disease Father     Diabetes Sister     Breast Cancer Sister     Dementia Sister     Heart Disease Brother     Diabetes Brother     Stroke Brother      Current Outpatient Medications   Medication Sig Dispense Refill    Cobalamin Combinations (NEURIVA PLUS PO) Take 1 each by mouth daily (Patient not taking: Reported on 2022)      sildenafil (VIAGRA) 100 MG tablet Take 1 tablet by mouth as needed for Erectile Dysfunction (Take 3 times weekly prn for erectile dysfunction.) 30 tablet 1    rivaroxaban (XARELTO) 20 MG TABS tablet Take 20 mg by mouth Daily with supper      FREESTYLE LITE strip USE 1 STRIP TO CHECK GLUCOSE ONCE DAILY AS DIRECTED  3    Cholecalciferol (VITAMIN D3) 2000 units CAPS Take 1 capsule by mouth 2 times daily       hydrochlorothiazide (HYDRODIURIL) 25 MG tablet Take 12.5 mg by mouth daily       losartan (COZAAR) 50 MG tablet Take 25 mg by mouth every morning       simvastatin (ZOCOR) 40 MG tablet Take 40 mg by mouth nightly        No current facility-administered medications for this visit. LABS - none    /78 (Site: Left Upper Arm)   Pulse 82   Temp 96.9 °F (36.1 °C)   Ht 5' 10\" (1.778 m)   Wt 215 lb 9.6 oz (97.8 kg)   SpO2 95% Comment: on RA  BMI 30.94 kg/m²    Wt Readings from Last 3 Encounters:   06/23/22 215 lb 9.6 oz (97.8 kg)   05/06/22 219 lb 6.4 oz (99.5 kg)   02/25/22 217 lb 6.4 oz (98.6 kg)     Neck Circumference - 15.50 in; Mallampati Score - 3        Objective:   Physical Exam  Vitals and nursing note reviewed. Constitutional:       General: He is not in acute distress. Appearance: He is well-developed. He is not diaphoretic. HENT:      Head: Normocephalic and atraumatic. Right Ear: External ear normal.      Left Ear: External ear normal.      Nose: Nose normal.   Eyes:      General: No scleral icterus. Pupils: Pupils are equal, round, and reactive to light. Neck:      Thyroid: No thyromegaly. Vascular: No JVD. Trachea: No tracheal deviation. Cardiovascular:      Rate and Rhythm: Normal rate and regular rhythm. Heart sounds: Normal heart sounds. No murmur heard. No friction rub. No gallop. Pulmonary:      Effort: Pulmonary effort is normal. No respiratory distress. Breath sounds: Normal breath sounds. No stridor. No wheezing or rales. Chest:      Chest wall: No tenderness. Abdominal:      General: Bowel sounds are normal. There is no distension. Palpations: Abdomen is soft. There is no mass. Tenderness: There is no abdominal tenderness. There is no guarding or rebound. Musculoskeletal:         General: No tenderness. Normal range of motion. Cervical back: Normal range of motion and neck supple. Lymphadenopathy:      Cervical: No cervical adenopathy. Skin:     General: Skin is warm and dry. Coloration: Skin is not pale. Findings: No erythema or rash. Neurological:      Mental Status: He is alert and oriented to person, place, and time. Cranial Nerves: No cranial nerve deficit. Coordination: Coordination normal.   Psychiatric:         Thought Content: Thought content normal.         CT chest:    PROCEDURE: CT CHEST W CONTRAST       CLINICAL INFORMATION: Lymphadenopathy. Lung nodules. Hemoptysis.       COMPARISON: CT chest 11/6/2019.       TECHNIQUE:    5 mm axial imaging through the chest with IV contrast. Coronal and sagittal reconstruction were performed.        All CT scans at this facility use dose modulation, iterative reconstruction, and/or weight based dosing when appropriate to reduce the radiation dose to as low as reasonably achievable.       CONTRAST: 85 cc Isovue-370           FINDINGS:   Heart/mediastinum: The heart size is normal. No pericardial effusion is observed. The aorta is not dilated. No aortic aneurysm or dissection is present. The thyroid gland is unremarkable. Calcified mediastinal and hilar lymph nodes suggesting prior    granulomatous appear stable. No mediastinal, hilar, or axillary lymphadenopathy is visualized.       Lungs: A benign calcified lower lobe granuloma is stable (series 3, 44 dependent atelectasis/scarring lung bases is unchanged. A 2 mm left upper lobe pulmonary nodule is stable (series 3, image 20). Centrilobular emphysema scarring at the lung apices is    unchanged. No new focal consolidation, pleural effusion, or pneumothorax is observed.  No new or suspicious pulmonary mass or nodule is identified.       Upper abdomen: No acute findings are noted in the limited images through the upper abdomen.       Musculoskeletal: Diffuse osteopenia is present. Degenerative disc disease is noted in the thoracic spine. The visualized skeletal structures appear intact.           Impression   1. Centrilobular emphysema. No new or suspicious pulmonary mass or nodule. No acute intrathoracic process.       2. Calcified mediastinal and hilar lymph nodes suggesting prior granulomatous disease are unchanged. No mediastinal, hilar, or axillary lymphadenopathy is visualized. Chronic findings are discussed.               **This report has been created using voice recognition software.  It may contain minor errors which are inherent in voice recognition technology. **       Final report electronically signed by Dr Estelita Ruiz on 6/15/2022 11:51 AM             Assessment:       Diagnosis Orders   1. 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 Granulomatous lung disease ([address]     3.  Hemoptysis       Hemoptysis has resolved and never significant, likely related to bronchitis while on Xarelto, no worrisome issues in CT chest.      Plan:     Reassurance, continue xarelto  Inform us any change in condition  RTC prn

## 2022-07-26 ENCOUNTER — TELEPHONE (OUTPATIENT)
Dept: ONCOLOGY | Age: 82
End: 2022-07-26

## 2022-08-01 DIAGNOSIS — C61 PROSTATE CANCER (HCC): Primary | ICD-10-CM

## 2022-08-03 ENCOUNTER — HOSPITAL ENCOUNTER (OUTPATIENT)
Age: 82
Discharge: HOME | End: 2022-08-03
Payer: MEDICARE

## 2022-08-03 DIAGNOSIS — C61 PROSTATE CANCER (HCC): ICD-10-CM

## 2022-08-03 LAB — PROSTATE SPECIFIC ANTIGEN: < 0.02 NG/ML (ref 0–1)

## 2022-08-03 PROCEDURE — 36415 COLL VENOUS BLD VENIPUNCTURE: CPT

## 2022-08-03 PROCEDURE — 84153 ASSAY OF PSA TOTAL: CPT

## 2022-08-12 ENCOUNTER — OFFICE VISIT (OUTPATIENT)
Dept: UROLOGY | Age: 82
End: 2022-08-12
Payer: MEDICARE

## 2022-08-12 VITALS
BODY MASS INDEX: 31.35 KG/M2 | SYSTOLIC BLOOD PRESSURE: 122 MMHG | DIASTOLIC BLOOD PRESSURE: 78 MMHG | WEIGHT: 219 LBS | HEIGHT: 70 IN

## 2022-08-12 DIAGNOSIS — C61 PROSTATE CANCER (HCC): Primary | ICD-10-CM

## 2022-08-12 LAB
BILIRUBIN URINE: NEGATIVE
BLOOD URINE, POC: NEGATIVE
CHARACTER, URINE: CLEAR
COLOR, URINE: YELLOW
GLUCOSE URINE: NEGATIVE MG/DL
KETONES, URINE: NEGATIVE
LEUKOCYTE CLUMPS, URINE: NEGATIVE
NITRITE, URINE: NEGATIVE
PH, URINE: 7 (ref 5–9)
PROTEIN, URINE: NEGATIVE MG/DL
SPECIFIC GRAVITY, URINE: 1.01 (ref 1–1.03)
UROBILINOGEN, URINE: 0.2 EU/DL (ref 0–1)

## 2022-08-12 PROCEDURE — 99213 OFFICE O/P EST LOW 20 MIN: CPT | Performed by: NURSE PRACTITIONER

## 2022-08-12 PROCEDURE — G8417 CALC BMI ABV UP PARAM F/U: HCPCS | Performed by: NURSE PRACTITIONER

## 2022-08-12 PROCEDURE — 1123F ACP DISCUSS/DSCN MKR DOCD: CPT | Performed by: NURSE PRACTITIONER

## 2022-08-12 PROCEDURE — G8427 DOCREV CUR MEDS BY ELIG CLIN: HCPCS | Performed by: NURSE PRACTITIONER

## 2022-08-12 PROCEDURE — 1036F TOBACCO NON-USER: CPT | Performed by: NURSE PRACTITIONER

## 2022-08-12 PROCEDURE — 81003 URINALYSIS AUTO W/O SCOPE: CPT | Performed by: NURSE PRACTITIONER

## 2022-08-12 RX ORDER — SULFAMETHOXAZOLE AND TRIMETHOPRIM 800; 160 MG/1; MG/1
1 TABLET ORAL 2 TIMES DAILY
COMMUNITY
Start: 2022-08-11 | End: 2022-08-18

## 2022-08-12 ASSESSMENT — ENCOUNTER SYMPTOMS
VOMITING: 0
NAUSEA: 0
ABDOMINAL PAIN: 0
BACK PAIN: 0

## 2022-08-12 NOTE — PROGRESS NOTES
[address] [address]  Dept: [phone]  Loc: [phone]    Visit Date: 8/12/2022        HPI:     Olga Browne is a 79 y.o. male who presents today for:  Chief Complaint   Patient presents with    Follow-up     PSA Prior     Prostate Cancer       HPI  Pt seen in follow up for prostate cancer. Pt underwent Robotic-Assisted Laparoscopic Radical Prostatectomy (RALRP) and bilateral pelvic lymph node dissection and bilateral nerve wrap 9/4/19 by Dr. Aubrie Barba. Final pathology resulted [place] 4+3=7 invasive moderately differentiated adenocarcinoma with extraprostatic extension, seminal vesicles and lymph nodes negative for neoplasm. Grade group 3. PT3a, pN0. Preoperative decipher testing high risk. Urinary incontinence resolved. Sildenafil 100 mg has not been very effective for ED. Discussed Trimix previously but not interested in pursuing at this time. Pt also has a hx of appendiceal ca s/p resection. Follows with Dr. Laura Onofre of medical oncology. Reports no evidence of recurrence thus far. Reports no changes since last appt other than some cellulitis undergoing management and treatment by PCP. Current Outpatient Medications   Medication Sig Dispense Refill    sulfamethoxazole-trimethoprim (BACTRIM DS;SEPTRA DS) 800-160 MG per tablet Take 1 tablet by mouth in the morning and 1 tablet before bedtime.       sildenafil (VIAGRA) 100 MG tablet Take 1 tablet by mouth as needed for Erectile Dysfunction (Take 3 times weekly prn for erectile dysfunction.) 30 tablet 1    rivaroxaban (XARELTO) 20 MG TABS tablet Take 20 mg by mouth Daily with supper      FREESTYLE LITE strip USE 1 STRIP TO CHECK GLUCOSE ONCE DAILY AS DIRECTED  3    Cholecalciferol (VITAMIN D3) 2000 units CAPS Take 1 capsule by mouth 2 times daily       hydrochlorothiazide (HYDRODIURIL) 25 MG tablet Take 12.5 mg by mouth daily losartan (COZAAR) 50 MG tablet Take 25 mg by mouth every morning       simvastatin (ZOCOR) 40 MG tablet Take 40 mg by mouth nightly       Cobalamin Combinations (NEURIVA PLUS PO) Take 1 each by mouth daily (Patient not taking: No sig reported)       No current facility-administered medications for this visit. Past Medical History  Laury White  has a past medical history of Atrial flutter ([address] Diabetes mellitus ([address] History of kidney stones, Hyperlipidemia, and Hypertension. Past Surgical History  The patient  has a past surgical history that includes Lithotripsy; Retinal detachment surgery (Bilateral); Colonoscopy (04/2018); Inguinal hernia repair (Left, 2014); pr lap,appendectomy (N/A, 5/31/2018); pr lap,surg,colectomy, partial, w/anast (Right, 6/21/2018); Appendectomy; pr office/outpt visit,procedure only (N/A, 10/17/2018); and Prostatectomy (N/A, 9/4/2019). Family History  This patient's family history includes Breast Cancer in his sister; Dementia in his sister; Diabetes in his brother, mother, and sister; Heart Disease in his brother; Kidney Disease in his father; Stroke in his brother and mother. Social History  Laury White  reports that he quit smoking about 29 years ago. His smoking use included cigarettes. He has a 38.00 pack-year smoking history. He has never used smokeless tobacco. He reports current alcohol use. He reports that he does not use drugs. Subjective:      Review of Systems   Constitutional:  Negative for activity change, appetite change, chills, diaphoresis, fatigue, fever and unexpected weight change. Gastrointestinal:  Negative for abdominal pain, nausea and vomiting. Genitourinary:  Negative for decreased urine volume, difficulty urinating, dysuria, flank pain, frequency, hematuria and urgency. Musculoskeletal:  Negative for back pain. Objective:   /78   Ht 5' 10\" (1.778 m)   Wt 219 lb (99.3 kg)   BMI 31.42 kg/m²     Physical Exam  Vitals reviewed. Constitutional:       General: He is not in acute distress. Appearance: Normal appearance. He is well-developed. He is not ill-appearing or diaphoretic. HENT:      Head: Normocephalic and atraumatic. Right Ear: External ear normal.      Left Ear: External ear normal.      Nose: Nose normal.      Mouth/Throat:      Mouth: Mucous membranes are moist.   Eyes:      General: No scleral icterus. Right eye: No discharge. Left eye: No discharge. Neck:      Vascular: No JVD. Trachea: No tracheal deviation. Pulmonary:      Effort: Pulmonary effort is normal. No respiratory distress. Abdominal:      General: There is no distension. Tenderness: There is no abdominal tenderness. There is no right CVA tenderness or left CVA tenderness. Musculoskeletal:         General: Normal range of motion. Neurological:      Mental Status: He is alert and oriented to person, place, and time. Mental status is at baseline. Psychiatric:         Mood and Affect: Mood normal.         Behavior: Behavior normal.         Thought Content:  Thought content normal.       POC  Results for POC orders placed in visit on 08/12/22   POCT Urinalysis No Micro (Auto)   Result Value Ref Range    Glucose, Ur Negative NEGATIVE mg/dl    Bilirubin Urine Negative     Ketones, Urine Negative NEGATIVE    Specific Gravity, Urine 1.015 1.002 - 1.030    Blood, UA POC Negative NEGATIVE    pH, Urine 7.00 5.0 - 9.0    Protein, Urine Negative NEGATIVE mg/dl    Urobilinogen, Urine 0.20 0.0 - 1.0 eu/dl    Nitrite, Urine Negative NEGATIVE    Leukocyte Clumps, Urine Negative NEGATIVE    Color, Urine Yellow [id number]    Character, Urine Clear [id number]         Patients recent PSA values are as follows  Lab Results   Component Value Date    PSA <0.02 08/03/2022    PSA <0.02 02/02/2022    PSA <0.02 08/03/2021        Recent BUN/Creatinine:  Lab Results   Component Value Date/Time    BUN 14 08/25/2020 10:42 AM    CREATININE 1.2 05/06/2022 12:50 PM     FINAL DIAGNOSIS:   A. Soft tissue, right pelvic region, excisional biopsy:    Benign fibroadipose tissue with infarct. B. Prostate, radical prostatectomy:    Invasive, moderately differentiated adenocarcinoma. [place] score = 7 (4+3), grade group = 3. Maximum tumor size =1.4 cm    Extraprostatic extension, right posterior, 20-30 mm. Perineural invasion    pT3a, pN0. Seminal vesicles, resections:    Negative for neoplasm. C. Right pelvic lymph nodes, resections:    Negative for neoplasm in 6 out of 6 lymph nodes. D. Left pelvic lymph node negative for neoplasm in, resection:    Negative for neoplasm in one out of one lymph node. Assessment:   Prostate cancer  Erectile dysfunction  Hx of appendiceal ca    Plan:     Pt doing well. PSA continues to be undetectable at <0.02 showing excellent control of prostate cancer at this time. F/u in 1 year with PSA a few days prior.

## 2023-01-30 ENCOUNTER — OFFICE VISIT (OUTPATIENT)
Dept: CARDIOLOGY CLINIC | Age: 83
End: 2023-01-30
Payer: MEDICARE

## 2023-01-30 VITALS
DIASTOLIC BLOOD PRESSURE: 62 MMHG | HEIGHT: 70 IN | SYSTOLIC BLOOD PRESSURE: 110 MMHG | WEIGHT: 212 LBS | TEMPERATURE: 90 F | BODY MASS INDEX: 30.35 KG/M2

## 2023-01-30 DIAGNOSIS — I10 ESSENTIAL HYPERTENSION: ICD-10-CM

## 2023-01-30 DIAGNOSIS — I48.91 ATRIAL FIBRILLATION, UNSPECIFIED TYPE (HCC): Primary | ICD-10-CM

## 2023-01-30 PROCEDURE — 3074F SYST BP LT 130 MM HG: CPT | Performed by: NURSE PRACTITIONER

## 2023-01-30 PROCEDURE — 93000 ELECTROCARDIOGRAM COMPLETE: CPT | Performed by: NURSE PRACTITIONER

## 2023-01-30 PROCEDURE — 99213 OFFICE O/P EST LOW 20 MIN: CPT | Performed by: NURSE PRACTITIONER

## 2023-01-30 PROCEDURE — G8484 FLU IMMUNIZE NO ADMIN: HCPCS | Performed by: NURSE PRACTITIONER

## 2023-01-30 PROCEDURE — 1036F TOBACCO NON-USER: CPT | Performed by: NURSE PRACTITIONER

## 2023-01-30 PROCEDURE — 1123F ACP DISCUSS/DSCN MKR DOCD: CPT | Performed by: NURSE PRACTITIONER

## 2023-01-30 PROCEDURE — 3078F DIAST BP <80 MM HG: CPT | Performed by: NURSE PRACTITIONER

## 2023-01-30 PROCEDURE — G8417 CALC BMI ABV UP PARAM F/U: HCPCS | Performed by: NURSE PRACTITIONER

## 2023-01-30 PROCEDURE — G8427 DOCREV CUR MEDS BY ELIG CLIN: HCPCS | Performed by: NURSE PRACTITIONER

## 2023-01-30 RX ORDER — ALBUTEROL SULFATE 90 UG/1
AEROSOL, METERED RESPIRATORY (INHALATION)
COMMUNITY
Start: 2022-10-24

## 2023-01-30 NOTE — PROGRESS NOTES
[place] [address]  [place] [address]  Dept: [phone]  Dept Fax: [phone]  Loc: [phone]    Visit Date: 1/30/2023    Primary Cardiologist: Lorenzo Rankin MD  Mr. Katie Dan is a 79 y.o. male  who presented for: 1 year follow-up    Chief Complaint   Patient presents with    Follow-up     F/u, 1 year        HPI:   HPI   Last seen in office on 1/27/22 per Dr. Carmen Choudhary. Per office note:  80 yo M hx of Afib/flutter on Xarelto who presents for follow-up. Following Dr. Nemesio Devine for appendiceal neoplasm. No CVA, no bleeding. BP stable. No chest pain, angina, BARROSO, orthopnea, PND, sob at rest, palpitations, LE edema, or syncope. Assessment/Plan   Atrial flutter/fib, [id number] = 3  Preserved EF, 7/2020  [place] - hearing aids  Progressive dementia  Erectile Dysfunction - discussed Viagara use and safety precautions  BP stable, no falls, no bleeding, tolerating OAC. Discussed diet/exercise/BP/weigh loss/health lifestyle choices/lipids; the patient understands the goals and will try to comply.   Disposition: 1 year      Current Outpatient Medications:     albuterol sulfate HFA (PROVENTIL;VENTOLIN;PROAIR) 108 (90 Base) MCG/ACT inhaler, inhale 2 puffs by mouth every 6 hours as needed for cough or wheezing, Disp: , Rfl:     rivaroxaban (XARELTO) 20 MG TABS tablet, Take 20 mg by mouth Daily with supper, Disp: , Rfl:     FREESTYLE LITE strip, USE 1 STRIP TO CHECK GLUCOSE ONCE DAILY AS DIRECTED, Disp: , Rfl: 3    Cholecalciferol (VITAMIN D3) 2000 units CAPS, Take 1 capsule by mouth 2 times daily , Disp: , Rfl:     hydrochlorothiazide (HYDRODIURIL) 25 MG tablet, Take 12.5 mg by mouth daily , Disp: , Rfl:     losartan (COZAAR) 50 MG tablet, Take 25 mg by mouth every morning , Disp: , Rfl:     simvastatin (ZOCOR) 40 MG tablet, Take 40 mg by mouth nightly , Disp: , Rfl:     Past Medical History  Chaz Harvey  has a past medical history of Atrial flutter ([address] Diabetes mellitus ([address] History of kidney stones, Hyperlipidemia, and Hypertension. Social History  Ganga Hernandez  reports that he quit smoking about 30 years ago. His smoking use included cigarettes. He has a 38.00 pack-year smoking history. He has never used smokeless tobacco. He reports current alcohol use. He reports that he does not use drugs. Family History  Ganga Hernandez family history includes Breast Cancer in his sister; Dementia in his sister; Diabetes in his brother, mother, and sister; Heart Disease in his brother; Kidney Disease in his father; Stroke in his brother and mother. There is no family history of bicuspid aortic valve, aneurysms, heart transplant, pacemakers, defibrillators, or sudden cardiac death.       Past Surgical History   Past Surgical History:   Procedure Laterality Date    APPENDECTOMY      COLONOSCOPY  04/2018    Dr. Shyam Franks Left 2014    [place], [place]    LITHOTRIPSY      [place] LAPAROSCOPIC APPENDECTOMY N/A 5/31/2018    APPENDECTOMY LAPAROSCOPIC performed by Ketty Hager MD at [address] [place] [address] 6/21/2018    ROBOTIC RIGHT COLECTOMY performed by Ktety Hager MD at [address] [place] [address] OFFICE/OUTPT [address] [place] [address] N/A 10/17/2018    CYSTO, LEFT URETEROSCOPY, LASER LITHOTRIPSY, BASKET RETRIEVAL OF STONES, STENT PLACEMENT performed by Gigi Enriquez MD at [place] [address] 9/4/2019    ROBOT ASSISTED LAPAROSCOPIC RADICAL PROSTATECTOMY performed by Gigi Enriquez MD at [facility] Bilateral      Today's visit:   Subjective:  Been doing fine  No chest discomfort  Breathing stable  No lightheadedness or dizziness; no syncope or near syncope  No swelling issues  Tolerating meds; no bleeding on Xarelto  Doing ADL - getting around fine; no falls    Review of Systems   Constitutional: Negative for chills and fever  HENT: Negative for congestion, sinus pressure, sneezing and sore throat. Eyes: Negative for pain, discharge, redness and itching. Respiratory: Negative for PND, orthopnea, cough  Gastrointestinal: Negative for blood in stool, constipation, diarrhea   Endocrine: Negative for cold intolerance, heat intolerance, polydipsia. Genitourinary: Negative for dysuria, hematuria. Musculoskeletal: Negative for arthralgias, joint swelling and neck pain. Neurological: Negative for numbness and headaches. Psychiatric/Behavioral: Negative for agitation, confusion, decreased concentration and dysphoric mood. Objective:     /62   Temp (!) 90 °F (32.2 °C)   Ht 5' 10\" (1.778 m)   Wt 212 lb (96.2 kg)   BMI 30.42 kg/m²     Wt Readings from Last 3 Encounters:   01/30/23 212 lb (96.2 kg)   08/12/22 219 lb (99.3 kg)   06/23/22 215 lb 9.6 oz (97.8 kg)     BP Readings from Last 3 Encounters:   01/30/23 110/62   08/12/22 122/78   06/23/22 136/78       Nursing note and vitals reviewed. Physical Exam   Constitutional: Oriented to person, place, and time. Appears well-developed and well-nourished. Accompanied by spouse. Bright, pleasant, no distress. HENT:   Head: Normocephalic and atraumatic. Eyes: EOM are normal. Pupils are equal, round, and reactive to light. Neck: Normal range of motion. Neck supple. No JVD present. Cardiovascular: Normal rate, irregular rhythm, normal heart sounds and intact distal pulses. No murmur heard. Pulmonary/Chest: Effort normal and breath sounds normal. No respiratory distress. No wheezes. No rales. Abdominal: Soft. Bowel sounds are normal. No distension. There is no tenderness. Musculoskeletal: Normal range of motion. No edema. Neurological: Alert and oriented to person, place, and time. No cranial nerve deficit. Coordination normal.   Skin: Skin is warm and dry. Psychiatric: Normal mood and affect.        No results found for: CKTOTAL, CKMB, CKMBINDEX    Lab Results   Component Value Date/Time    WBC 6.8 02/24/2021 11:03 AM RBC 4.39 2021 11:03 AM    HGB 13.2 2021 11:03 AM    HCT 40.6 2021 11:03 AM    MCV 93 2021 11:03 AM    MCH 30.1 2021 11:03 AM    MCHC 32.5 2021 11:03 AM    RDW 13.3 2021 11:03 AM     2021 11:03 AM    MPV 9.3 2021 11:03 AM       Lab Results   Component Value Date/Time     2021 11:03 AM     2019 06:30 AM    K 4.1 2021 11:03 AM    K 4.2 2019 06:30 AM    K 3.3 2018 05:07 AM     2019 06:30 AM    CO2 25 2020 10:42 AM    BUN 14 2020 10:42 AM    LABALBU 4.0 2021 11:03 AM    CREATININE 1.2 2022 12:50 PM    CALCIUM 8.5 2019 06:30 AM    LABGLOM 58 2022 12:50 PM    GLUCOSE 165 2019 06:30 AM       Lab Results   Component Value Date/Time    ALKPHOS 50 2021 11:03 AM    ALT 29 2021 11:03 AM    AST 26 2021 11:03 AM    PROT 7.8 2021 11:03 AM    BILITOT 0.3 2021 11:03 AM    BILIDIR <0.2 2021 11:03 AM    LABALBU 4.0 2021 11:03 AM       Lab Results   Component Value Date/Time    MG 1.9 2018 05:07 AM       No results found for: INR, PROTIME      No results found for: LABA1C    No results found for: TRIG, HDL, LDLCALC, LDLDIRECT, LABVLDL    No results found for: TSH      Testing Reviewed:      I have individually reviewed the cardiac test below:  EK23  Atrial rhythm with gross ventricular rate of 90/min.,     22: EKG: /min    ECHO: 07/15/20  Indications:Atrial fibrillation. Additional Medical History: Former smoker, prostate cancer, dyspnea on  exertion, diabetes, hypertension, hyperlipidemia  Summary  Technically difficult examination. Ejection fraction was estimated at 60-65%. There was trace mitral regurgitation. Ascending aorta = 3.5 cm. IVC size is within normal limits with normal respiratory phasic changes.   Signature  ----------------------------------------------------------------  Electronically signed by Teo Herrera MD (Interpreting  physician) on 07/15/2020 at 05:55 PM       Assessment/Plan   Atrial flutter/fib, HEMQO4COPD = 3; Xarelto  Preserved EF, 7/2020  [place] - hearing aids  Progressive dementia  Erectile Dysfunction - discussed Viagara use and safety precautions  BP stable, no falls, no bleeding, tolerating OAC. Discussed diet/exercise/BP/weigh loss/health lifestyle choices/lipids; the patient understands the goals and will try to comply. Doing well. No anginal sx. No evidence of decompensated HF, euvolemic on exam. Tolerating meds; no bleeding on xarelto. No falls or GIB. Continue current medications as prescribed. Stay as active as you can. If you were to develop symptoms of lightheadedness or dizziness or if not feeling as well then call. Will continue to watch overall heart rate - if seeming to want to race then may need to adjust your medications. Follow-up with your PCP as scheduled. Follow-up with Dr. Gael Mckeon in one year as scheduled or sooner if need.      Disposition:  1 year         Electronically signed by SERVANDO Hooper CNP   1/30/2023 at 9:59 AM EST

## 2023-01-30 NOTE — PATIENT INSTRUCTIONS
Continue current medications as prescribed.    Stay as active as you can.     If you were to develop symptoms of lightheadedness or dizziness or if not feeling as well then call. Will continue to watch overall heart rate - if seeming to want to race then may need to adjust your medications.     Follow-up with your PCP as scheduled.    Follow-up with Dr. Roldan in one year as scheduled or sooner if need.

## 2023-02-22 DIAGNOSIS — C18.1 CANCER OF APPENDIX (HCC): Primary | ICD-10-CM

## 2023-02-22 DIAGNOSIS — C61 PROSTATE CANCER (HCC): ICD-10-CM

## 2023-02-23 ENCOUNTER — OFFICE VISIT (OUTPATIENT)
Dept: ONCOLOGY | Age: 83
End: 2023-02-23
Payer: MEDICARE

## 2023-02-23 ENCOUNTER — HOSPITAL ENCOUNTER (OUTPATIENT)
Dept: INFUSION THERAPY | Age: 83
Discharge: HOME | End: 2023-02-23
Payer: MEDICARE

## 2023-02-23 VITALS
RESPIRATION RATE: 16 BRPM | TEMPERATURE: 97.6 F | HEART RATE: 80 BPM | DIASTOLIC BLOOD PRESSURE: 72 MMHG | SYSTOLIC BLOOD PRESSURE: 122 MMHG

## 2023-02-23 VITALS
TEMPERATURE: 97.6 F | OXYGEN SATURATION: 95 % | RESPIRATION RATE: 16 BRPM | HEART RATE: 80 BPM | BODY MASS INDEX: 30.49 KG/M2 | DIASTOLIC BLOOD PRESSURE: 72 MMHG | SYSTOLIC BLOOD PRESSURE: 122 MMHG | HEIGHT: 70 IN | WEIGHT: 213 LBS

## 2023-02-23 DIAGNOSIS — C18.1 CANCER OF APPENDIX (HCC): ICD-10-CM

## 2023-02-23 DIAGNOSIS — D37.3 LOW GRADE MUCINOUS NEOPLASM OF APPENDIX: Primary | ICD-10-CM

## 2023-02-23 DIAGNOSIS — C61 PROSTATE CANCER (HCC): ICD-10-CM

## 2023-02-23 LAB
ABSOLUTE IMMATURE GRANULOCYTE: 0 THOU/MM3 (ref 0–0.07)
ALBUMIN SERPL BCG-MCNC: 3.9 G/DL (ref 3.5–5.1)
ALP SERPL-CCNC: 51 U/L (ref 38–126)
ALT SERPL W/O P-5'-P-CCNC: 20 U/L (ref 11–66)
AST SERPL-CCNC: 22 U/L (ref 5–40)
BASOPHILS ABSOLUTE: 0 THOU/MM3 (ref 0–0.1)
BASOPHILS NFR BLD AUTO: 1 % (ref 0–3)
BILIRUB CONJ SERPL-MCNC: < 0.2 MG/DL (ref 0–0.3)
BILIRUB SERPL-MCNC: 0.4 MG/DL (ref 0.3–1.2)
BUN BLDP-MCNC: 10 MG/DL (ref 8–26)
CEA SERPL-MCNC: 1.6 NG/ML (ref 0–5)
CHLORIDE BLD-SCNC: 101 MEQ/L (ref 98–109)
CREAT BLD-MCNC: 1 MG/DL (ref 0.5–1.2)
EOSINOPHIL NFR BLD AUTO: 4 % (ref 0–4)
EOSINOPHILS ABSOLUTE: 0.2 THOU/MM3 (ref 0–0.4)
ERYTHROCYTE [DISTWIDTH] IN BLOOD BY AUTOMATED COUNT: 13 % (ref 11.5–14.5)
GFR SERPL CREATININE-BSD FRML MDRD: > 60 ML/MIN/1.73M2
GLUCOSE BLD-MCNC: 85 MG/DL (ref 70–108)
HCT VFR BLD AUTO: 40.8 % (ref 42–52)
HGB BLD-MCNC: 13.6 GM/DL (ref 14–18)
IMMATURE GRANULOCYTES: 0 %
IONIZED CALCIUM, WHOLE BLOOD: 1.15 MMOL/L (ref 1.12–1.32)
LYMPHOCYTES ABSOLUTE: 1.2 THOU/MM3 (ref 1–4.8)
LYMPHOCYTES NFR BLD AUTO: 22 % (ref 15–47)
MCH RBC QN AUTO: 30.8 PG (ref 26–33)
MCHC RBC AUTO-ENTMCNC: 33.3 GM/DL (ref 32.2–35.5)
MCV RBC AUTO: 93 FL (ref 80–94)
MONOCYTES ABSOLUTE: 0.9 THOU/MM3 (ref 0.4–1.3)
MONOCYTES NFR BLD AUTO: 16 % (ref 0–12)
NEUTROPHILS NFR BLD AUTO: 59 % (ref 43–75)
PLATELET # BLD AUTO: 215 THOU/MM3 (ref 130–400)
PMV BLD AUTO: 9.3 FL (ref 9.4–12.4)
POTASSIUM BLD-SCNC: 4 MEQ/L (ref 3.5–4.9)
PROT SERPL-MCNC: 7.3 G/DL (ref 6.1–8)
PSA SERPL-MCNC: < 0.02 NG/ML (ref 0–1)
RBC # BLD AUTO: 4.41 MILL/MM3 (ref 4.7–6.1)
SEGMENTED NEUTROPHILS ABSOLUTE COUNT: 3.3 THOU/MM3 (ref 1.8–7.7)
SODIUM BLD-SCNC: 138 MEQ/L (ref 138–146)
TOTAL CO2, WHOLE BLOOD: 26 MEQ/L (ref 23–33)
WBC # BLD AUTO: 5.7 THOU/MM3 (ref 4.8–10.8)

## 2023-02-23 PROCEDURE — 84153 ASSAY OF PSA TOTAL: CPT

## 2023-02-23 PROCEDURE — G8427 DOCREV CUR MEDS BY ELIG CLIN: HCPCS | Performed by: INTERNAL MEDICINE

## 2023-02-23 PROCEDURE — 82378 CARCINOEMBRYONIC ANTIGEN: CPT

## 2023-02-23 PROCEDURE — 80047 BASIC METABLC PNL IONIZED CA: CPT

## 2023-02-23 PROCEDURE — 1036F TOBACCO NON-USER: CPT | Performed by: INTERNAL MEDICINE

## 2023-02-23 PROCEDURE — 99211 OFF/OP EST MAY X REQ PHY/QHP: CPT

## 2023-02-23 PROCEDURE — 80076 HEPATIC FUNCTION PANEL: CPT

## 2023-02-23 PROCEDURE — 36415 COLL VENOUS BLD VENIPUNCTURE: CPT

## 2023-02-23 PROCEDURE — G8484 FLU IMMUNIZE NO ADMIN: HCPCS | Performed by: INTERNAL MEDICINE

## 2023-02-23 PROCEDURE — 85025 COMPLETE CBC W/AUTO DIFF WBC: CPT

## 2023-02-23 PROCEDURE — 99204 OFFICE O/P NEW MOD 45 MIN: CPT | Performed by: INTERNAL MEDICINE

## 2023-02-23 PROCEDURE — 1123F ACP DISCUSS/DSCN MKR DOCD: CPT | Performed by: INTERNAL MEDICINE

## 2023-02-23 PROCEDURE — G8417 CALC BMI ABV UP PARAM F/U: HCPCS | Performed by: INTERNAL MEDICINE

## 2023-02-23 NOTE — PROGRESS NOTES
[address] CANCER [address] [place] [address]  Dept: [phone]  Loc: [phone]   Hematology/Oncology Progress Note (Clinic)        Anbael Parson  1940    2/23/23    No ref. 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  -Prostate adenocarcinoma. Dx July 2019  Pathologic stage T3a N0 [place] score 7 (4+3), grade group 3 ,all  6 right pelvic nodes uninvolved 1 left pelvic lymph node uninvolved. Perineal invasion present and extraprostatic extension present. TREATMENT:   -Diagnostic laparoscopy with appendectomy 5/31/2018  Due to positive proximal margin patient underwent right colon resection 6/21/2018. Negative for malignancy and all 12 pericolonic lymph nodes were negative for malignancy.  -Radical prostatectomy 9/4/2019    Followable Disease:   -Abdominal pelvic CT.  3/24/2021  -Chest CT with contrast 6/15/2022  -PSA (8/3/2022) = <0.02  -CEA (2/25/22) = 1.8    Comorbidities:  See below. Include atrial flutter, prediabetes, nephrolithiasis, hyperlipidemia and hypertension      Subjective: Per patient with Dr. Silvano Wong last seen 2/25/2022. At that time he is doing well with no evidence of recurrence.     ROS:  Review of Systems     PMH:   Past Medical History:   Diagnosis Date    Atrial flutter ([address]     Diabetes mellitus ([facility]     diet and exercise controlled    History of kidney stones 1996    Hyperlipidemia     Hypertension         Social HX:   Social History     Socioeconomic History    Marital status:      Spouse name: Not on file    Number of children: Not on file    Years of education: Not on file    Highest education level: Not on file   Occupational History    Not on file   Tobacco Use    Smoking status: Former     Packs/day: 1.00     Years: 38.00     Pack years: 38.00     Types: Cigarettes     Quit date: 45     Years since quittin.1    Smokeless tobacco: Never   Vaping Use    Vaping Use: Never used   Substance and Sexual Activity    Alcohol use: Yes     Comment: socially beer on weekends    Drug use: No    Sexual activity: Not on file   Other Topics Concern    Not on file   Social History Narrative    Not on file     Social Determinants of Health     Financial Resource Strain: Not on file   Food Insecurity: Not on file   Transportation Needs: Not on file   Physical Activity: Not on file   Stress: Not on file   Social Connections: Not on file   Intimate Partner Violence: Not on file   Housing Stability: Not on file        Jennifer Parker    Phone: [phone] [employer] [address]     Employment:  retired    Immunizations:  Immunization History   Administered Date(s) Administered    COVID-19, PFIZER Bivalent BOOSTER, DO NOT Dilute, (age 12y+), IM, 30 mcg/0.3 mL 2022    COVID-19, PFIZER PURPLE top, DILUTE for use, (age 15 y+), 30mcg/0.3mL 2021, 2021, 10/25/2021        Health Screenings:    C-Scope: est 2018  Prostate:   Lab Results   Component Value Date    PSA <0.02 2022    PSA <0.02 2022    PSA <0.02 2021        Health Maintenance Due   Topic Date Due    Lipids  Never done    Depression Screen  Never done    Pneumococcal 65+ years Vaccine (2 - PCV) 2005    Shingles vaccine (2 of 3) 2013    Annual Wellness Visit (AWV)  Never done        Interests:       Fam HX:   Family History   Problem Relation Age of Onset    Diabetes Mother     Stroke Mother     Kidney Disease Father     Diabetes Sister     Breast Cancer Sister     Dementia Sister     Heart Disease Brother     Diabetes Brother     Stroke Brother         Hospitalizations:   None recent    Allergies:   Allergies   Allergen Reactions    Lisinopril Other (See Comments)     coughing  Other reaction(s): Cough        Adult Illness:  Patient Active Problem List   Diagnosis    Appendix disease    Appendiceal tumor    S/P right colectomy    Renal stones    Prostate cancer [facility])        Surgery:  Past Surgical History:   Procedure Laterality Date    APPENDECTOMY      COLONOSCOPY  04/2018    Dr. Diana Curry Left 2014    [place], [place]    LITHOTRIPSY      [place] LAPAROSCOPIC APPENDECTOMY N/A 5/31/2018    APPENDECTOMY LAPAROSCOPIC performed by Oscar Jackson MD at [facility] Right 6/21/2018    ROBOTIC RIGHT COLECTOMY performed by Oscar Jackson MD at [facility] OFFICE/OUTPT [facility] N/A 10/17/2018    CYSTO, LEFT URETEROSCOPY, LASER LITHOTRIPSY, BASKET RETRIEVAL OF STONES, STENT PLACEMENT performed by Jessica Coles MD at [address] [facility] N/A 9/4/2019    ROBOT ASSISTED LAPAROSCOPIC RADICAL PROSTATECTOMY performed by Jessica Coles MD at [place] [address] Bilateral         Medications:  Current Outpatient Medications   Medication Sig Dispense Refill    albuterol sulfate HFA (PROVENTIL;VENTOLIN;PROAIR) 108 (90 Base) MCG/ACT inhaler inhale 2 puffs by mouth every 6 hours as needed for cough or wheezing      rivaroxaban (XARELTO) 20 MG TABS tablet Take 20 mg by mouth Daily with supper      FREESTYLE LITE strip USE 1 STRIP TO CHECK GLUCOSE ONCE DAILY AS DIRECTED  3    Cholecalciferol (VITAMIN D3) 2000 units CAPS Take 1 capsule by mouth 2 times daily       hydrochlorothiazide (HYDRODIURIL) 25 MG tablet Take 12.5 mg by mouth daily       losartan (COZAAR) 50 MG tablet Take 25 mg by mouth every morning       simvastatin (ZOCOR) 40 MG tablet Take 40 mg by mouth nightly        No current facility-administered medications for this visit. EXAM:   vitals were not taken for this visit. Estimated body surface area is 2.18 meters squared as calculated from the following:    Height as of 1/30/23: 5' 10\" (1.778 m).     Weight as of 23: 212 lb (96.2 kg).     ECO    General: Non-ill appearing.  HEENT: NC/AT,nonicteric, perrla,eom intact, no mucosal lesions  Neck: normal thyroid, no masses  Nodes: No adenopathy  Lungs/chest: clear, no rales,rhonchi or wheezing, lung bases clear  CV: rrr, no rubs ,gallops or murmurs  Breasts:   Abd/Rectal: soft, non-tender,bowel sounds normal , no HSM,no masses  Back: normal curvature, No midline tenderness.flanks nontender  : Not Examined  Extremities: no cyanosis,clubbing or edema.  Skin: unremarkable  Neuro: A and O x 4, CN exam nonfocal, Motor- no deficits, Sensory- no deficits, gait-nl, speech- fluent, no ataxia.  Devices: none    DATA:  LAB:     CBC with Differential:      Lab Results   Component Value Date/Time    WBC 6.8 2021 11:03 AM    RBC 4.39 2021 11:03 AM    HGB 13.2 2021 11:03 AM    HCT 40.6 2021 11:03 AM     2021 11:03 AM    MCV 93 2021 11:03 AM    MCH 30.1 2021 11:03 AM    MCHC 32.5 2021 11:03 AM    RDW 13.3 2021 11:03 AM    NRBC 0 2019 09:43 AM    SEGSPCT 60.5 2019 09:43 AM    LYMPHOPCT 26.5 2019 12:00 AM    MONOPCT 14.2 2019 09:43 AM    MONOPCT 12.6 2019 12:00 AM    EOSPCT 4.0 2019 12:00 AM    BASOPCT 1.1 2019 12:00 AM    MONOSABS 1.2 2021 11:03 AM    LYMPHSABS 1.5 2021 11:03 AM    EOSABS 0.3 2021 11:03 AM    BASOSABS 0.1 2021 11:03 AM      Lab Results   Component Value Date/Time    SEGSABS 3.7 2021 11:03 AM       CMP:    Lab Results   Component Value Date/Time     2021 11:03 AM     2019 06:30 AM    K 4.1 2021 11:03 AM    K 4.2 2019 06:30 AM    K 3.3 2018 05:07 AM     2019 06:30 AM    CO2 25 2020 10:42 AM    BUN 14 2020 10:42 AM    CREATININE 1.2 2022 12:50 PM    LABGLOM 58 2022 12:50 PM    GLUCOSE 165 2019 06:30 AM    PROT 7.8 2021 11:03 AM    LABALBU 4.0 2021  11:03 AM    CALCIUM 8.5 09/05/2019 06:30 AM    BILITOT 0.3 02/24/2021 11:03 AM    ALKPHOS 50 02/24/2021 11:03 AM    AST 26 02/24/2021 11:03 AM    ALT 29 02/24/2021 11:03 AM       BMP:    Lab Results   Component Value Date/Time     02/24/2021 11:03 AM     09/05/2019 06:30 AM    K 4.1 02/24/2021 11:03 AM    K 4.2 09/05/2019 06:30 AM    K 3.3 06/22/2018 05:07 AM     09/05/2019 06:30 AM    CO2 25 08/25/2020 10:42 AM    BUN 14 08/25/2020 10:42 AM    LABALBU 4.0 02/24/2021 11:03 AM    CREATININE 1.2 05/06/2022 12:50 PM    CALCIUM 8.5 09/05/2019 06:30 AM    LABGLOM 58 05/06/2022 12:50 PM    GLUCOSE 165 09/05/2019 06:30 AM       Magnesium:    Lab Results   Component Value Date/Time    MG 1.9 06/22/2018 05:07 AM     PT/INR:  No results found for: PROTIME, INR  TSH:  No results found for: TSH  VITAMIN B12: No components found for: B12  FOLATE:  No results found for: FOLATE  IRON:  No results found for: IRON  Iron Saturation:  No components found for: PERCENTFE  TIBC:  No results found for: TIBC  FERRITIN:  No results found for: FERRITIN  PSA:   Lab Results   Component Value Date/Time    PSA <0.02 08/03/2022 08:55 AM            IMAGING:  Chest CT w contrast  6/15/22       Impression   1. Centrilobular emphysema. No new or suspicious pulmonary mass or nodule. No acute intrathoracic process. 2. Calcified mediastinal and hilar lymph nodes suggesting prior granulomatous disease are unchanged. No mediastinal, hilar, or axillary lymphadenopathy is visualized. Chronic findings are discussed. CT A/P w contrast 3/24/21  CLINICAL INFORMATION: Cancer of appendix ([facility] . COMPARISON: 9/2/2020       TECHNIQUE: 2-D multiplanar postcontrast images of the abdomen and pelvis including Isovue-370 IV and Omnipaque 240 oral   All CT scans at this facility use dose modulation, iterative reconstruction, and/or weight-based dosing when appropriate to reduce radiation dose to as low as reasonably achievable. FINDINGS: Lung bases   Calcified granuloma left lung base. Lungs are emphysematous. Undersurface the heart is unremarkable. Abdomen pelvis   Liver, and spleen are normal.   The pancreas is normal.   Gallbladder is unremarkable. Adrenals are normal.   Subcentimeter low-density lesion right kidney statistically a cyst kidneys enhance symmetrically. Aorta is heavily calcified. Calcific atherosclerosis of the origin of the superior mesenteric artery but no significant stenosis. No central mesenteric or retroperitoneal lymphadenopathy. Pelvis no bowel obstruction. Prior right hemicolectomy. No abnormal fluid collections. Urinary bladder is unremarkable. Prior prostatectomy   No pelvic or inguinal lymphadenopathy. Bones   No suspicious bone lesions. Impression   Stable CT scan abdomen and pelvis no evidence of progression of disease. PROCEDURES:  above    PATHOLOGY:   above    GENETICS:  na    MOLECULAR:  na      ASSESSMENT/PLAN:    1: Diagnosis: 76-year-old gentleman with a history of a low-grade mucinous neoplasm of the appendix approximately 5 years ago and also localized prostate cancer in 2019 as above. No evidence of recurrence thus far. Clinically doing well. 2) Prognosis / Disease Status: Low risk for recurrence. 3) Work-up:    Labs: CBC, CMP PSA and CEA   Imaging: CT abdomen pelvis with contrast.  Compared to last study done 2 years ago in 2021   Procedures:   Consults:   Other:    4) Symptom Management: Asymptomatic. 5) Supportive care provided. Level of care is appropriate. 6) Treatment goal:      Treatment plan:      Surveillance. See above      7) Medications reviewed. Prescriptions today:            No orders of the defined types were placed in this encounter.        [place]:  Controlled Substance Monitoring:    Acute and Chronic Pain Monitoring:   RX Monitoring 10/17/2018   Attestation The Prescription Monitoring Report for this patient was reviewed today. Periodic Controlled Substance Monitoring No signs of potential drug abuse or diversion identified. 8) Research Options:       Not applicable      9) Other:        None    10) Follow Up: Follow-up with me in 3 weeks. At that time we will have the labs from today to review and the abdominal pelvic CT. If everything is good then he can come back in 1 year.         Ellie Romo MD

## 2023-02-23 NOTE — PATIENT INSTRUCTIONS
Today's labs will include CBC, CMP CEA and PSA  Schedule CT abdomen pelvis with contrast.  Follow-up with me in 3 weeks to review labs and CT results.

## 2023-03-09 ENCOUNTER — HOSPITAL ENCOUNTER (OUTPATIENT)
Dept: CT IMAGING | Age: 83
Discharge: HOME | End: 2023-03-09
Payer: MEDICARE

## 2023-03-09 DIAGNOSIS — D37.3 LOW GRADE MUCINOUS NEOPLASM OF APPENDIX: ICD-10-CM

## 2023-03-09 PROCEDURE — 74177 CT ABD & PELVIS W/CONTRAST: CPT

## 2023-03-09 PROCEDURE — 6360000004 HC RX CONTRAST MEDICATION: Performed by: INTERNAL MEDICINE

## 2023-03-09 RX ADMIN — IOPAMIDOL 100 ML: 755 INJECTION, SOLUTION INTRAVENOUS at 10:53

## 2023-03-09 RX ADMIN — IOHEXOL 50 ML: 240 INJECTION, SOLUTION INTRATHECAL; INTRAVASCULAR; INTRAVENOUS; ORAL at 09:35

## 2023-03-15 NOTE — PROGRESS NOTES
[address] CANCER [address] [place] [address]  Dept: [phone]  Loc: [phone]   Hematology/Oncology Progress Note (Clinic)        Devika Navarro  1940    3/16/23    No ref. 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  -Prostate adenocarcinoma. Dx July 2019  Pathologic stage T3a N0 Abel score 7 (4+3), grade group 3 ,all  6 right pelvic nodes uninvolved 1 left pelvic lymph node uninvolved. Perineal invasion present and extraprostatic extension present. TREATMENT:   -Diagnostic laparoscopy with appendectomy 5/31/2018  Due to positive proximal margin patient underwent right colon resection 6/21/2018. Negative for malignancy and all 12 pericolonic lymph nodes were negative for malignancy.  -Radical prostatectomy 9/4/2019    Followable Disease:   -Abdominal pelvic CT.  3/24/2021  -Chest CT with contrast 6/15/2022  -PSA (8/3/2022) = <0.02  -CEA (2/25/22) = 1.8    Comorbidities:  See below. Include atrial flutter, prediabetes, nephrolithiasis, hyperlipidemia and hypertension      Subjective: Per patient with Dr. Zaira Kelley last seen 2/25/2022. At that time he is doing well with no evidence of recurrence. Here for results of recent labs including tumor markers and CT all of which are unremarkable.     ROS:  Review of Systems     PMH:   Past Medical History:   Diagnosis Date    Atrial flutter ([facility]     Cancer of appendix ([facility]     Diabetes mellitus ([facility]     diet and exercise controlled    History of kidney stones 1996    Hyperlipidemia     Hypertension         Social HX:   Social History     Socioeconomic History    Marital status:      Spouse name: Not on file    Number of children: Not on file    Years of education: Not on file Highest education level: Not on file   Occupational History    Not on file   Tobacco Use    Smoking status: Former     Packs/day: 1.00     Years: 38.00     Pack years: 38.00     Types: Cigarettes     Quit date: 46     Years since quittin.2    Smokeless tobacco: Never   Vaping Use    Vaping Use: Never used   Substance and Sexual Activity    Alcohol use: Yes     Comment: socially beer on weekends    Drug use: No    Sexual activity: Not on file   Other Topics Concern    Not on file   Social History Narrative    Not on file     Social Determinants of Health     Financial Resource Strain: Not on file   Food Insecurity: Not on file   Transportation Needs: Not on file   Physical Activity: Not on file   Stress: Not on file   Social Connections: Not on file   Intimate Partner Violence: Not on file   Housing Stability: Not on file        Petr Robles    Phone: [address]     Employment:  retired    Immunizations:  Immunization History   Administered Date(s) Administered    COVID-19, PFIZER Bivalent BOOSTER, DO NOT Dilute, (age 12y+), IM, 30 mcg/0.3 mL 2022    COVID-19, PFIZER PURPLE top, DILUTE for use, (age 15 y+), 30mcg/0.3mL 2021, 2021, 10/25/2021        Health Screenings:    C-Scope: est 2018  Prostate:   Lab Results   Component Value Date    PSA <0.02 2023    PSA <0.02 2022    PSA <0.02 2022        Health Maintenance Due   Topic Date Due    Lipids  Never done    Depression Screen  Never done    Pneumococcal 65+ years Vaccine (2 - PCV) 2005    Shingles vaccine (2 of 3) 2013    Annual Wellness Visit (AWV)  Never done        Interests:       Fam HX:   Family History   Problem Relation Age of Onset    Diabetes Mother     Stroke Mother     Kidney Disease Father     Diabetes Sister     Breast Cancer Sister     Dementia Sister     Heart Disease Brother     Diabetes Brother     Stroke Brother         Hospitalizations:   None recent    Allergies: Allergies   Allergen Reactions    Lisinopril Other (See Comments)     coughing  Other reaction(s): Cough        Adult Illness:  Patient Active Problem List   Diagnosis    Appendix disease    Appendiceal tumor    S/P right colectomy    Renal stones    Prostate cancer [facility])        Surgery:  Past Surgical History:   Procedure Laterality Date    APPENDECTOMY      COLONOSCOPY  04/2018    Dr. Diana Curry Left 2014    [place]    LITHOTRIPSY      TX LAPAROSCOPIC APPENDECTOMY N/A 5/31/2018    APPENDECTOMY LAPAROSCOPIC performed by Oscar Jackson MD at [facility] Right 6/21/2018    ROBOTIC RIGHT COLECTOMY performed by Oscar Jackson MD at [address] [facility] OFFICE/OUTPT [address] [place] [address] N/A 10/17/2018    CYSTO, LEFT URETEROSCOPY, LASER LITHOTRIPSY, BASKET RETRIEVAL OF STONES, STENT PLACEMENT performed by Jessica Coles MD at [address] N/A 9/4/2019    ROBOT ASSISTED LAPAROSCOPIC RADICAL PROSTATECTOMY performed by Jessica Coles MD at [place] [address] Bilateral         Medications:  Current Outpatient Medications   Medication Sig Dispense Refill    albuterol sulfate HFA (PROVENTIL;VENTOLIN;PROAIR) 108 (90 Base) MCG/ACT inhaler inhale 2 puffs by mouth every 6 hours as needed for cough or wheezing      rivaroxaban (XARELTO) 20 MG TABS tablet Take 20 mg by mouth Daily with supper      FREESTYLE LITE strip USE 1 STRIP TO CHECK GLUCOSE ONCE DAILY AS DIRECTED  3    Cholecalciferol (VITAMIN D3) 2000 units CAPS Take 1 capsule by mouth 2 times daily       hydrochlorothiazide (HYDRODIURIL) 25 MG tablet Take 12.5 mg by mouth daily       losartan (COZAAR) 50 MG tablet Take 25 mg by mouth every morning       simvastatin (ZOCOR) 40 MG tablet Take 20 mg by mouth nightly       No current facility-administered medications for this visit. EXAM:   vitals were not taken for this visit. Estimated body surface area is 2.18 meters squared as calculated from the following:    Height as of 23: 5' 10\" (1.778 m). Weight as of 23: 213 lb (96.6 kg). ECO    General: Non-ill appearing. HEENT: NC/AT,nonicteric, perrla,eom intact, no mucosal lesions  Neck: normal thyroid, no masses  Nodes: No adenopathy  Lungs/chest: clear, no rales,rhonchi or wheezing, lung bases clear  CV: rrr, no rubs ,gallops or murmurs  Breasts:   Abd/Rectal: soft, non-tender,bowel sounds normal , no HSM,no masses  Back: normal curvature, No midline tenderness. flanks nontender  : Not Examined  Extremities: no cyanosis,clubbing or edema. Skin: unremarkable  Neuro: A and O x 4, CN exam nonfocal, Motor- no deficits, Sensory- no deficits, gait-nl, speech- fluent, no ataxia.   Devices: none    DATA:  LAB:     CBC with Differential:      Lab Results   Component Value Date/Time    WBC 5.7 2023 09:49 AM    RBC 4.41 2023 09:49 AM    HGB 13.6 2023 09:49 AM    HCT 40.8 2023 09:49 AM     2023 09:49 AM    MCV 93 2023 09:49 AM    MCH 30.8 2023 09:49 AM    MCHC 33.3 2023 09:49 AM    RDW 13.0 2023 09:49 AM    NRBC 0 2019 09:43 AM    SEGSPCT 60.5 2019 09:43 AM    LYMPHOPCT 26.5 2019 12:00 AM    MONOPCT 14.2 2019 09:43 AM    MONOPCT 12.6 2019 12:00 AM    EOSPCT 4.0 2019 12:00 AM    BASOPCT 1.1 2019 12:00 AM    MONOSABS 0.9 2023 09:49 AM    LYMPHSABS 1.2 2023 09:49 AM    EOSABS 0.2 2023 09:49 AM    BASOSABS 0.0 2023 09:49 AM      Lab Results   Component Value Date/Time    SEGSABS 3.3 2023 09:49 AM       CMP:    Lab Results   Component Value Date/Time     2023 09:48 AM     2019 06:30 AM    K 4.0 2023 09:48 AM    K 4.2 2019 06:30 AM    K 3.3 2018 05:07 AM     2019 06:30 AM    CO2 25 2020 10:42 AM    BUN 14 2020 10:42 AM    CREATININE 1.0 02/23/2023 09:48 AM    CREATININE 1.2 05/06/2022 12:50 PM    LABGLOM >60 02/23/2023 09:48 AM    GLUCOSE 165 09/05/2019 06:30 AM    PROT 7.3 02/23/2023 09:48 AM    LABALBU 3.9 02/23/2023 09:48 AM    CALCIUM 8.5 09/05/2019 06:30 AM    BILITOT 0.4 02/23/2023 09:48 AM    ALKPHOS 51 02/23/2023 09:48 AM    AST 22 02/23/2023 09:48 AM    ALT 20 02/23/2023 09:48 AM       BMP:    Lab Results   Component Value Date/Time     02/23/2023 09:48 AM     09/05/2019 06:30 AM    K 4.0 02/23/2023 09:48 AM    K 4.2 09/05/2019 06:30 AM    K 3.3 06/22/2018 05:07 AM     09/05/2019 06:30 AM    CO2 25 08/25/2020 10:42 AM    BUN 14 08/25/2020 10:42 AM    LABALBU 3.9 02/23/2023 09:48 AM    CREATININE 1.0 02/23/2023 09:48 AM    CREATININE 1.2 05/06/2022 12:50 PM    CALCIUM 8.5 09/05/2019 06:30 AM    LABGLOM >60 02/23/2023 09:48 AM    GLUCOSE 165 09/05/2019 06:30 AM       Magnesium:    Lab Results   Component Value Date/Time    MG 1.9 06/22/2018 05:07 AM       PSA:   Lab Results   Component Value Date/Time    PSA <0.02 02/23/2023 09:49 AM      CEA=1.6  (2/23/23)      IMAGING:  PROCEDURE: CT ABDOMEN PELVIS W IV CONTRAST    3/9/23       CLINICAL INFORMATION: Low grade mucinous neoplasm of appendix       COMPARISON: CT abdomen and pelvis 3/24/2021. TECHNIQUE: Axial 5 mm CT images were obtained through the abdomen and pelvis after the administration of 100  cc Isovue 370 intravenous contrast. Coronal and sagittal reconstructions were obtained. All CT scans at this facility use dose modulation, iterative reconstruction, and/or weight-based dosing when appropriate to reduce radiation dose to as low as reasonably achievable. FINDINGS:    Lung bases: A benign calcified left lower lobe granuloma is stable. Fibrosis/scarring at the lung bases appears unchanged. Liver/gallbladder/bilary tree: No radiopaque gallstones or biliary ductal dilatation is identified. Hepatic steatosis is stable.  No liver lesions are observed.       Pancreas: Normal.   Spleen : Normal.   Adrenal glands: Normal.       Kidneys/ ureters/ bladder: A small hypoattenuating lesion in the midpole of the right kidney is stable (series 2, image 41). No renal calculus, hydronephrosis, or hydroureter is visualized. The urinary bladder is underdistended and and the wall remain    circumferentially thickened, similar to the prior study.       Gastrointestinal:  No bowel obstruction, free fluid, fluid collection, or free air is observed. Mild residual fecal material seen throughout the colon. A small sliding-type hiatal hernia is unchanged.       Retroperitoneum / lymph nodes: The aorta is not dilated. No lymphadenopathy is visualized.       Pelvis: The prostate gland is not visualized. Postoperative changes related to left inguinal hernia repair are intact and unchanged.       Musculoskeletal: Diffuse osteopenia is present. Multilevel degenerative disc disease is noted in the thoracic and lumbar spine.           Impression   1. No acute findings are visualized within the abdomen/pelvis. No metastatic disease is observed.       2. Multiple stable chronic findings are discussed.               **This report has been created using voice recognition software.  It may contain minor errors which are inherent in voice recognition technology.**       Final report electronically signed by Dr Kailey Orourke on 3/9/2023 1:40 PM     Chest CT w contrast  6/15/22       Impression   1. Centrilobular emphysema. No new or suspicious pulmonary mass or nodule. No acute intrathoracic process.       2. Calcified mediastinal and hilar lymph nodes suggesting prior granulomatous disease are unchanged. No mediastinal, hilar, or axillary lymphadenopathy is visualized. Chronic findings are discussed.         CT A/P w contrast 3/24/21  CLINICAL INFORMATION: Cancer of appendix (HCC) .       COMPARISON: 9/2/2020       TECHNIQUE: 2-D multiplanar postcontrast images of the abdomen and  pelvis including Isovue-370 IV and Omnipaque 240 oral   All CT scans at this facility use dose modulation, iterative reconstruction, and/or weight-based dosing when appropriate to reduce radiation dose to as low as reasonably achievable. FINDINGS: Lung bases   Calcified granuloma left lung base. Lungs are emphysematous. Undersurface the heart is unremarkable. Abdomen pelvis   Liver, and spleen are normal.   The pancreas is normal.   Gallbladder is unremarkable. Adrenals are normal.   Subcentimeter low-density lesion right kidney statistically a cyst kidneys enhance symmetrically. Aorta is heavily calcified. Calcific atherosclerosis of the origin of the superior mesenteric artery but no significant stenosis. No central mesenteric or retroperitoneal lymphadenopathy. Pelvis no bowel obstruction. Prior right hemicolectomy. No abnormal fluid collections. Urinary bladder is unremarkable. Prior prostatectomy   No pelvic or inguinal lymphadenopathy. Bones   No suspicious bone lesions. Impression   Stable CT scan abdomen and pelvis no evidence of progression of disease. PROCEDURES:  above    PATHOLOGY:   above    GENETICS:  na    MOLECULAR:  na      ASSESSMENT/PLAN:    1: Diagnosis: 79-year-old gentleman with a history of a low-grade mucinous neoplasm of the appendix approximately 5 years ago and also localized prostate cancer in 2019 as above. No evidence of recurrence thus far. Clinically doing well. 2) Prognosis / Disease Status: Low risk for recurrence. 3) Work-up:    Labs: CBC, CMP PSA and CEA-reviewed unremarkable including normal tumor markers. Imaging: CT abdomen pelvis with contrast. 3/9/23 SIRI. Compared to last study done 2 years ago in 2021. Stable without worrisome findings. Procedures:   Consults:   Other:    4) Symptom Management: Asymptomatic. 5) Supportive care provided. Level of care is appropriate.                6) Treatment goal: Treatment plan:      Surveillance. See above      7) Medications reviewed. Prescriptions today:            No orders of the defined types were placed in this encounter. [place]ARRS:  Controlled Substance Monitoring:    Acute and Chronic Pain Monitoring:   RX Monitoring 10/17/2018   Attestation The Prescription Monitoring Report for this patient was reviewed today. Periodic Controlled Substance Monitoring No signs of potential drug abuse or diversion identified. 8) Research Options:       Not applicable      9) Other:        None    10) Follow Up: Follow-up with nurse practitioner  in 1 year.         Lily Kennedy MD

## 2023-03-16 ENCOUNTER — OFFICE VISIT (OUTPATIENT)
Dept: ONCOLOGY | Age: 83
End: 2023-03-16
Payer: MEDICARE

## 2023-03-16 ENCOUNTER — HOSPITAL ENCOUNTER (OUTPATIENT)
Dept: INFUSION THERAPY | Age: 83
Discharge: HOME | End: 2023-03-16
Payer: MEDICARE

## 2023-03-16 VITALS
BODY MASS INDEX: 30.21 KG/M2 | RESPIRATION RATE: 16 BRPM | TEMPERATURE: 97.7 F | OXYGEN SATURATION: 95 % | WEIGHT: 211 LBS | HEART RATE: 85 BPM | HEIGHT: 70 IN | DIASTOLIC BLOOD PRESSURE: 70 MMHG | SYSTOLIC BLOOD PRESSURE: 116 MMHG

## 2023-03-16 VITALS
SYSTOLIC BLOOD PRESSURE: 116 MMHG | RESPIRATION RATE: 16 BRPM | TEMPERATURE: 97.7 F | DIASTOLIC BLOOD PRESSURE: 70 MMHG | HEART RATE: 85 BPM

## 2023-03-16 DIAGNOSIS — C18.1 CANCER OF APPENDIX (HCC): ICD-10-CM

## 2023-03-16 DIAGNOSIS — D37.3 LOW GRADE MUCINOUS NEOPLASM OF APPENDIX: Primary | ICD-10-CM

## 2023-03-16 DIAGNOSIS — C61 PROSTATE CANCER (HCC): ICD-10-CM

## 2023-03-16 LAB
ABSOLUTE IMMATURE GRANULOCYTE: 0.01 THOU/MM3 (ref 0–0.07)
ALBUMIN SERPL BCG-MCNC: 3.8 G/DL (ref 3.5–5.1)
ALP SERPL-CCNC: 50 U/L (ref 38–126)
ALT SERPL W/O P-5'-P-CCNC: 18 U/L (ref 11–66)
AST SERPL-CCNC: 21 U/L (ref 5–40)
BASOPHILS ABSOLUTE: 0 THOU/MM3 (ref 0–0.1)
BASOPHILS NFR BLD AUTO: 1 % (ref 0–3)
BILIRUB CONJ SERPL-MCNC: < 0.2 MG/DL (ref 0–0.3)
BILIRUB SERPL-MCNC: 0.4 MG/DL (ref 0.3–1.2)
BUN BLDP-MCNC: 11 MG/DL (ref 8–26)
CEA SERPL-MCNC: 1.6 NG/ML (ref 0–5)
CHLORIDE BLD-SCNC: 99 MEQ/L (ref 98–109)
CREAT BLD-MCNC: 1.1 MG/DL (ref 0.5–1.2)
EOSINOPHIL NFR BLD AUTO: 4 % (ref 0–4)
EOSINOPHILS ABSOLUTE: 0.2 THOU/MM3 (ref 0–0.4)
ERYTHROCYTE [DISTWIDTH] IN BLOOD BY AUTOMATED COUNT: 13 % (ref 11.5–14.5)
GFR SERPL CREATININE-BSD FRML MDRD: > 60 ML/MIN/1.73M2
GLUCOSE BLD-MCNC: 92 MG/DL (ref 70–108)
HCT VFR BLD AUTO: 42.3 % (ref 42–52)
HGB BLD-MCNC: 13.9 GM/DL (ref 14–18)
IMMATURE GRANULOCYTES: 0 %
IONIZED CALCIUM, WHOLE BLOOD: 1.15 MMOL/L (ref 1.12–1.32)
LYMPHOCYTES ABSOLUTE: 1.3 THOU/MM3 (ref 1–4.8)
LYMPHOCYTES NFR BLD AUTO: 25 % (ref 15–47)
MCH RBC QN AUTO: 30.7 PG (ref 26–33)
MCHC RBC AUTO-ENTMCNC: 32.9 GM/DL (ref 32.2–35.5)
MCV RBC AUTO: 93 FL (ref 80–94)
MONOCYTES ABSOLUTE: 0.8 THOU/MM3 (ref 0.4–1.3)
MONOCYTES NFR BLD AUTO: 16 % (ref 0–12)
NEUTROPHILS NFR BLD AUTO: 55 % (ref 43–75)
PLATELET # BLD AUTO: 231 THOU/MM3 (ref 130–400)
PMV BLD AUTO: 9.4 FL (ref 9.4–12.4)
POTASSIUM BLD-SCNC: 4.1 MEQ/L (ref 3.5–4.9)
PROT SERPL-MCNC: 7.5 G/DL (ref 6.1–8)
RBC # BLD AUTO: 4.53 MILL/MM3 (ref 4.7–6.1)
SEGMENTED NEUTROPHILS ABSOLUTE COUNT: 2.8 THOU/MM3 (ref 1.8–7.7)
SODIUM BLD-SCNC: 137 MEQ/L (ref 138–146)
TOTAL CO2, WHOLE BLOOD: 28 MEQ/L (ref 23–33)
WBC # BLD AUTO: 5.2 THOU/MM3 (ref 4.8–10.8)

## 2023-03-16 PROCEDURE — 99213 OFFICE O/P EST LOW 20 MIN: CPT | Performed by: INTERNAL MEDICINE

## 2023-03-16 PROCEDURE — 82378 CARCINOEMBRYONIC ANTIGEN: CPT

## 2023-03-16 PROCEDURE — G8484 FLU IMMUNIZE NO ADMIN: HCPCS | Performed by: INTERNAL MEDICINE

## 2023-03-16 PROCEDURE — 1036F TOBACCO NON-USER: CPT | Performed by: INTERNAL MEDICINE

## 2023-03-16 PROCEDURE — 36415 COLL VENOUS BLD VENIPUNCTURE: CPT

## 2023-03-16 PROCEDURE — 1123F ACP DISCUSS/DSCN MKR DOCD: CPT | Performed by: INTERNAL MEDICINE

## 2023-03-16 PROCEDURE — 99211 OFF/OP EST MAY X REQ PHY/QHP: CPT

## 2023-03-16 PROCEDURE — 80076 HEPATIC FUNCTION PANEL: CPT

## 2023-03-16 PROCEDURE — 80047 BASIC METABLC PNL IONIZED CA: CPT

## 2023-03-16 PROCEDURE — G8427 DOCREV CUR MEDS BY ELIG CLIN: HCPCS | Performed by: INTERNAL MEDICINE

## 2023-03-16 PROCEDURE — G8417 CALC BMI ABV UP PARAM F/U: HCPCS | Performed by: INTERNAL MEDICINE

## 2023-03-16 PROCEDURE — 85025 COMPLETE CBC W/AUTO DIFF WBC: CPT

## 2023-06-06 ENCOUNTER — OFFICE VISIT (OUTPATIENT)
Dept: UROLOGY | Age: 83
End: 2023-06-06
Payer: MEDICARE

## 2023-06-06 VITALS
WEIGHT: 215 LBS | HEIGHT: 70 IN | DIASTOLIC BLOOD PRESSURE: 60 MMHG | SYSTOLIC BLOOD PRESSURE: 120 MMHG | BODY MASS INDEX: 30.78 KG/M2

## 2023-06-06 DIAGNOSIS — L24.A2 IRRITANT CONTACT DERMATITIS DUE TO URINARY INCONTINENCE: Primary | ICD-10-CM

## 2023-06-06 PROCEDURE — 99214 OFFICE O/P EST MOD 30 MIN: CPT | Performed by: NURSE PRACTITIONER

## 2023-06-06 PROCEDURE — G8417 CALC BMI ABV UP PARAM F/U: HCPCS | Performed by: NURSE PRACTITIONER

## 2023-06-06 PROCEDURE — 1036F TOBACCO NON-USER: CPT | Performed by: NURSE PRACTITIONER

## 2023-06-06 PROCEDURE — 1123F ACP DISCUSS/DSCN MKR DOCD: CPT | Performed by: NURSE PRACTITIONER

## 2023-06-06 PROCEDURE — G8427 DOCREV CUR MEDS BY ELIG CLIN: HCPCS | Performed by: NURSE PRACTITIONER

## 2023-06-06 RX ORDER — ZINC OXIDE
OINTMENT (GRAM) TOPICAL
Qty: 397 G | Refills: 2 | Status: SHIPPED | OUTPATIENT
Start: 2023-06-06

## 2023-06-06 RX ORDER — MICONAZOLE NITRATE 20 MG/G
POWDER TOPICAL
Qty: 45 G | Refills: 1 | Status: SHIPPED | OUTPATIENT
Start: 2023-06-06

## 2023-06-06 ASSESSMENT — ENCOUNTER SYMPTOMS
BACK PAIN: 0
ABDOMINAL PAIN: 0
VOMITING: 0
NAUSEA: 0

## 2023-06-06 NOTE — PROGRESS NOTES
Sore on scrotum. Wears thin pads after prostate surgery, had yeast infection but healed except the one spot. The spot won't heal as pad keeps pulling the scab off.
dermatitis  Prostate cancer  ED  Hx appendiceal ca    Plan:     Pt has area of contact dermatitis on scrotum where he has irritation from urine. Area has overall improved but struggling to completely resolve. No signs of cellulitis or infection currently. Discussed use of 40% zinc oxide paste and 2% miconazole powder twice daily and prn. Discussed padding around end of penis to prevent leakage of urine down onto scrotal tissue. F/u in 6 weeks. Call for any worsening or failure to improve prior.

## 2023-07-18 ENCOUNTER — OFFICE VISIT (OUTPATIENT)
Dept: UROLOGY | Age: 83
End: 2023-07-18
Payer: MEDICARE

## 2023-07-18 VITALS
DIASTOLIC BLOOD PRESSURE: 70 MMHG | BODY MASS INDEX: 30.78 KG/M2 | SYSTOLIC BLOOD PRESSURE: 138 MMHG | RESPIRATION RATE: 16 BRPM | WEIGHT: 215 LBS | HEIGHT: 70 IN

## 2023-07-18 DIAGNOSIS — C61 PROSTATE CANCER (HCC): Primary | ICD-10-CM

## 2023-07-18 PROCEDURE — G8427 DOCREV CUR MEDS BY ELIG CLIN: HCPCS | Performed by: NURSE PRACTITIONER

## 2023-07-18 PROCEDURE — 1123F ACP DISCUSS/DSCN MKR DOCD: CPT | Performed by: NURSE PRACTITIONER

## 2023-07-18 PROCEDURE — 99213 OFFICE O/P EST LOW 20 MIN: CPT | Performed by: NURSE PRACTITIONER

## 2023-07-18 PROCEDURE — G8417 CALC BMI ABV UP PARAM F/U: HCPCS | Performed by: NURSE PRACTITIONER

## 2023-07-18 PROCEDURE — 1036F TOBACCO NON-USER: CPT | Performed by: NURSE PRACTITIONER

## 2023-07-18 ASSESSMENT — ENCOUNTER SYMPTOMS
BACK PAIN: 0
VOMITING: 0
ABDOMINAL PAIN: 0
NAUSEA: 0

## 2023-07-18 NOTE — PROGRESS NOTES
[address]  [facility] [place] [address]  Dept: [phone]  Loc: [phone]    Visit Date: 7/18/2023        HPI:     Ismael Cabrera is a 79 y.o. male who presents today for:  Chief Complaint   Patient presents with    Follow-up     Irritant contact dermatitis due to urinary incontinence    Prostate Cancer       HPI  Pt seen in follow up contact dermatitis from urinary incontinence. Mr. Madiha Mata has a hx of prostate cancer s/p Robotic-Assisted Laparoscopic Radical Prostatectomy (RALRP) and bilateral pelvic lymph node dissection and bilateral nerve wrap 9/4/19 by Dr. Jocelyne Burgess. Final pathology resulted Abel 4+3=7 invasive moderately differentiated adenocarcinoma with extraprostatic extension, seminal vesicles and lymph nodes negative for neoplasm. Grade group 3. PT3a, pN0. Preoperative decipher testing high risk. PSA 2/23/23 <0.02. Pt also has a hx of appendiceal ca s/p resection. Follows with medical oncology. Reports no evidence of recurrence thus far. Recently has struggled with contact dermatitis from his incontinence. He was recommended to use 40% zinc oxide paste and 2% miconazole powder twice daily and prn and to pad around the end of penis to prevent leakage of urine down onto the scrotal tissue. Pt and wife report irritation has completely resolved. Current Outpatient Medications   Medication Sig Dispense Refill    zinc oxide (DESITIN) 40 % ointment Apply topically twice daily for 10 days and then as needed. 397 g 2    miconazole (ZEASORB-AF) 2 % powder Apply topically 2 times daily for 10 days or until irritation resolved then prn.  45 g 1    albuterol sulfate HFA (PROVENTIL;VENTOLIN;PROAIR) 108 (90 Base) MCG/ACT inhaler inhale 2 puffs by mouth every 6 hours as needed for cough or wheezing      rivaroxaban (XARELTO) 20 MG TABS tablet Take 1 tablet by mouth Daily with supper      FREESTYLE LITE

## 2023-07-30 ENCOUNTER — HOSPITAL ENCOUNTER (EMERGENCY)
Age: 83
Discharge: HOME | End: 2023-07-31
Attending: FAMILY MEDICINE
Payer: MEDICARE

## 2023-07-30 DIAGNOSIS — M17.0 TRICOMPARTMENT OSTEOARTHRITIS OF KNEES, BILATERAL: Primary | ICD-10-CM

## 2023-07-30 PROCEDURE — 99283 EMERGENCY DEPT VISIT LOW MDM: CPT

## 2023-07-30 ASSESSMENT — PAIN DESCRIPTION - LOCATION
LOCATION: LEG
LOCATION_2: KNEE
LOCATION: LEG
LOCATION_2: KNEE

## 2023-07-30 ASSESSMENT — PAIN - FUNCTIONAL ASSESSMENT
PAIN_FUNCTIONAL_ASSESSMENT: 0-10
PAIN_FUNCTIONAL_ASSESSMENT: 0-10

## 2023-07-30 ASSESSMENT — PAIN DESCRIPTION - DESCRIPTORS
DESCRIPTORS: ACHING
DESCRIPTORS_2: ACHING

## 2023-07-30 ASSESSMENT — PAIN SCALES - GENERAL
PAINLEVEL_OUTOF10: 5
PAINLEVEL_OUTOF10: 5

## 2023-07-30 ASSESSMENT — PAIN DESCRIPTION - INTENSITY
RATING_2: 5
RATING_2: 5

## 2023-07-30 ASSESSMENT — LIFESTYLE VARIABLES: HOW OFTEN DO YOU HAVE A DRINK CONTAINING ALCOHOL: NEVER

## 2023-07-30 ASSESSMENT — PAIN DESCRIPTION - ORIENTATION
ORIENTATION: LEFT;RIGHT
ORIENTATION_2: LEFT;RIGHT

## 2023-07-31 ENCOUNTER — APPOINTMENT (OUTPATIENT)
Dept: GENERAL RADIOLOGY | Age: 83
End: 2023-07-31
Payer: MEDICARE

## 2023-07-31 VITALS
HEIGHT: 70 IN | WEIGHT: 215 LBS | SYSTOLIC BLOOD PRESSURE: 123 MMHG | BODY MASS INDEX: 30.78 KG/M2 | OXYGEN SATURATION: 96 % | HEART RATE: 100 BPM | TEMPERATURE: 97.4 F | RESPIRATION RATE: 16 BRPM | DIASTOLIC BLOOD PRESSURE: 63 MMHG

## 2023-07-31 PROCEDURE — 73564 X-RAY EXAM KNEE 4 OR MORE: CPT

## 2023-07-31 PROCEDURE — 6370000000 HC RX 637 (ALT 250 FOR IP): Performed by: FAMILY MEDICINE

## 2023-07-31 ASSESSMENT — PAIN SCALES - GENERAL
PAINLEVEL_OUTOF10: 0
PAINLEVEL_OUTOF10: 0

## 2023-07-31 ASSESSMENT — ENCOUNTER SYMPTOMS
NAUSEA: 0
VOMITING: 0

## 2023-07-31 ASSESSMENT — PAIN - FUNCTIONAL ASSESSMENT
PAIN_FUNCTIONAL_ASSESSMENT: NONE - DENIES PAIN
PAIN_FUNCTIONAL_ASSESSMENT: NONE - DENIES PAIN

## 2023-07-31 ASSESSMENT — PAIN DESCRIPTION - INTENSITY
RATING_2: 0
RATING_2: 0

## 2023-07-31 NOTE — ED NOTES
Patient observed resp easy, sitting up in bed, warm and dry. Patient stable for discharge instructions provided. Patient educated on medications, pain control, signs and symptoms, and follow up. Patient verbalized understanding, questions answered. Assisted the patient in getting dressed. Patient taken to vehicle, assisted into vehicle. Family and patient voiced appreciation.      Chery Harrison RN  07/31/23 [id number]

## 2023-07-31 NOTE — ED PROVIDER NOTES
[facility]  eMERGENCY dEPARTMENT eNCOUnter          [address]       Chief Complaint   Patient presents with    Leg Pain     Bilateral leg pain. Knee Pain     Bilateral knee       Nurses Notes reviewed and I agree except as noted in the HPI. HISTORY OF PRESENT ILLNESS    Windy Miller is a 79 y.o. male who presents with bilateral knee pain. Onset worse in last couple of days. No fever,chills. No history of recent trauma. No leg swelling. REVIEW OF SYSTEMS     Review of Systems   Constitutional:  Positive for activity change. Negative for chills and fever. Gastrointestinal:  Negative for nausea and vomiting. Musculoskeletal:  Positive for arthralgias. Negative for joint swelling. Skin:  Negative for rash and wound. All other systems reviewed and are negative. PAST MEDICAL HISTORY    has a past medical history of Atrial flutter ([address]), Cancer of appendix ([address]), Diabetes mellitus ([address]), History of kidney stones, Hyperlipidemia, and Hypertension. SURGICAL HISTORY      has a past surgical history that includes Lithotripsy; Retinal detachment surgery (Bilateral); Colonoscopy (04/2018); Inguinal hernia repair (Left, 2014); pr laparoscopic appendectomy (N/A, 5/31/2018); pr laparoscopy colectomy partial w/anastomosis (Right, 6/21/2018); Appendectomy; pr office/outpt visit,procedure only (N/A, 10/17/2018); and Prostatectomy (N/A, 9/4/2019).     CURRENT MEDICATIONS       Previous Medications    ALBUTEROL SULFATE HFA (PROVENTIL;VENTOLIN;PROAIR) 108 (90 BASE) MCG/ACT INHALER    inhale 2 puffs by mouth every 6 hours as needed for cough or wheezing    CHOLECALCIFEROL (VITAMIN D3) 2000 UNITS CAPS    Take 1 capsule by mouth 2 times daily    FREESTYLE LITE STRIP    USE 1 STRIP TO CHECK GLUCOSE ONCE DAILY AS DIRECTED    HYDROCHLOROTHIAZIDE (HYDRODIURIL) 25 MG TABLET    Take 0.5 tablets by mouth daily    LOSARTAN (COZAAR) 50 MG TABLET    Take 0.5 tablets by mouth every morning

## 2023-07-31 NOTE — ED TRIAGE NOTES
Wife stated, \" he is having pain in his legs. It hurts to walk. Last night we went to [employer] and he had problems with kneeling. He never did before. \" I observed patient walking slowly assisted by wife using a cane. Patient taken to room with W/C. Patient reported, \"legs hurt so bad. Left leg normally swells. Knees both of them hurt. \" Observed left lower legs larger than the right one. Patient related, \"Painful when knee touched. \"

## 2023-07-31 NOTE — ED NOTES
Wife stated, Maya Soriano is not his cane. I bought it for me and he couldn't walk I thought it could help him. The pain is getting worse. It happened before but it was his shoulders. I took him to [facility]. [facility] and they had to give him steroids. \"     Durga Cui RN  07/31/23 0010

## 2023-07-31 NOTE — ED NOTES
Patient back from xray. Observed patient resp easy, warm and dry. Needs denied.      Rhona Rayo RN  07/31/23 7319

## 2023-08-31 ENCOUNTER — HOSPITAL ENCOUNTER (EMERGENCY)
Age: 83
Discharge: HOME | End: 2023-09-01
Attending: EMERGENCY MEDICINE
Payer: MEDICARE

## 2023-08-31 ENCOUNTER — APPOINTMENT (OUTPATIENT)
Dept: CT IMAGING | Age: 83
End: 2023-08-31
Payer: MEDICARE

## 2023-08-31 ENCOUNTER — APPOINTMENT (OUTPATIENT)
Dept: GENERAL RADIOLOGY | Age: 83
End: 2023-08-31
Payer: MEDICARE

## 2023-08-31 VITALS
TEMPERATURE: 98 F | HEIGHT: 69 IN | HEART RATE: 106 BPM | SYSTOLIC BLOOD PRESSURE: 146 MMHG | DIASTOLIC BLOOD PRESSURE: 78 MMHG | OXYGEN SATURATION: 96 % | BODY MASS INDEX: 31.55 KG/M2 | RESPIRATION RATE: 23 BRPM | WEIGHT: 213 LBS

## 2023-08-31 DIAGNOSIS — R91.8 LEFT LOWER LOBE PULMONARY INFILTRATE: ICD-10-CM

## 2023-08-31 DIAGNOSIS — I48.91 ATRIAL FIBRILLATION WITH RVR (HCC): ICD-10-CM

## 2023-08-31 DIAGNOSIS — R10.13 ABDOMINAL PAIN, EPIGASTRIC: Primary | ICD-10-CM

## 2023-08-31 LAB
ALBUMIN SERPL BCP-MCNC: 3.6 GM/DL (ref 3.4–5)
ALP SERPL-CCNC: 62 U/L (ref 46–116)
ALT SERPL W P-5'-P-CCNC: 15 U/L (ref 14–63)
ANION GAP SERPL CALC-SCNC: 7 MEQ/L (ref 8–16)
AST SERPL W P-5'-P-CCNC: 18 U/L (ref 15–37)
BASOPHILS # BLD: 0.2 % (ref 0–3)
BASOPHILS ABSOLUTE: 0 THOU/MM3 (ref 0–0.1)
BILIRUB SERPL-MCNC: 0.3 MG/DL (ref 0.2–1)
BUN SERPL-MCNC: 14 MG/DL (ref 7–18)
CALCIUM SERPL-MCNC: 9.5 MG/DL (ref 8.5–10.1)
CHLORIDE SERPL-SCNC: 98 MEQ/L (ref 98–107)
CO2 SERPL-SCNC: 30 MEQ/L (ref 21–32)
CREAT SERPL-MCNC: 1.1 MG/DL (ref 0.6–1.3)
EOSINOPHILS ABSOLUTE: 0.1 THOU/MM3 (ref 0–0.5)
EOSINOPHILS RELATIVE PERCENT: 1.5 % (ref 0–4)
GFR SERPL CREATININE-BSD FRML MDRD: > 60 ML/MIN/1.73M2
GLUCOSE SERPL-MCNC: 140 MG/DL (ref 74–106)
HCT VFR BLD CALC: 38.4 % (ref 42–52)
HEMOGLOBIN: 13.3 GM/DL (ref 14–18)
IMMATURE GRANS (ABS): 0.02 THOU/MM3 (ref 0–0.07)
IMMATURE GRANULOCYTES: 0 %
LIPASE SERPL-CCNC: 93 U/L (ref 73–393)
LYMPHOCYTES # BLD AUTO: 12.7 % (ref 15–47)
LYMPHOCYTES ABSOLUTE: 1.2 THOU/MM3 (ref 1–4.8)
MCH RBC QN AUTO: 32.4 PG (ref 26–32)
MCHC RBC AUTO-ENTMCNC: 34.6 GM/DL (ref 31–35)
MCV RBC AUTO: 93.4 FL (ref 80–94)
MONOCYTES: 1.4 THOU/MM3 (ref 0.3–1.3)
MONOCYTES: 15.4 % (ref 0–12)
PDW BLD-RTO: 14.1 % (ref 11.5–14.9)
PLATELET # BLD AUTO: 202 THOU/MM3 (ref 130–400)
PMV BLD AUTO: 9.3 FL (ref 9.4–12.4)
POTASSIUM SERPL-SCNC: 3.7 MEQ/L (ref 3.5–5.1)
PROT SERPL-MCNC: 8.5 GM/DL (ref 6.4–8.2)
RBC # BLD: 4.11 MILL/MM3 (ref 4.5–6.1)
SEG NEUTROPHILS: 70 % (ref 43–75)
SEGMENTED NEUTROPHILS ABSOLUTE COUNT: 6.6 THOU/MM3 (ref 1.8–7.7)
SODIUM SERPL-SCNC: 135 MEQ/L (ref 136–145)
TROPONIN, HIGH SENSITIVITY: 6.7 PG/ML (ref 0–76.1)
TROPONIN, HIGH SENSITIVITY: 8.6 PG/ML (ref 0–76.1)
WBC # BLD: 9.4 THOU/MM3 (ref 4.8–10.8)

## 2023-08-31 PROCEDURE — 6360000004 HC RX CONTRAST MEDICATION: Performed by: EMERGENCY MEDICINE

## 2023-08-31 PROCEDURE — 85025 COMPLETE CBC W/AUTO DIFF WBC: CPT

## 2023-08-31 PROCEDURE — 6370000000 HC RX 637 (ALT 250 FOR IP): Performed by: EMERGENCY MEDICINE

## 2023-08-31 PROCEDURE — 36415 COLL VENOUS BLD VENIPUNCTURE: CPT

## 2023-08-31 PROCEDURE — 84484 ASSAY OF TROPONIN QUANT: CPT

## 2023-08-31 PROCEDURE — 99285 EMERGENCY DEPT VISIT HI MDM: CPT

## 2023-08-31 PROCEDURE — C9113 INJ PANTOPRAZOLE SODIUM, VIA: HCPCS | Performed by: EMERGENCY MEDICINE

## 2023-08-31 PROCEDURE — 80053 COMPREHEN METABOLIC PANEL: CPT

## 2023-08-31 PROCEDURE — 96374 THER/PROPH/DIAG INJ IV PUSH: CPT

## 2023-08-31 PROCEDURE — 6360000002 HC RX W HCPCS: Performed by: EMERGENCY MEDICINE

## 2023-08-31 PROCEDURE — 81001 URINALYSIS AUTO W/SCOPE: CPT

## 2023-08-31 PROCEDURE — 83690 ASSAY OF LIPASE: CPT

## 2023-08-31 PROCEDURE — 71045 X-RAY EXAM CHEST 1 VIEW: CPT

## 2023-08-31 PROCEDURE — 93005 ELECTROCARDIOGRAM TRACING: CPT | Performed by: EMERGENCY MEDICINE

## 2023-08-31 PROCEDURE — 74177 CT ABD & PELVIS W/CONTRAST: CPT

## 2023-08-31 RX ORDER — CALCIUM CARBONATE 500 MG/1
1 TABLET, CHEWABLE ORAL DAILY
COMMUNITY

## 2023-08-31 RX ORDER — AMOXICILLIN AND CLAVULANATE POTASSIUM 875; 125 MG/1; MG/1
1 TABLET, FILM COATED ORAL ONCE
Status: COMPLETED | OUTPATIENT
Start: 2023-08-31 | End: 2023-08-31

## 2023-08-31 RX ORDER — PANTOPRAZOLE SODIUM 40 MG/10ML
40 INJECTION, POWDER, LYOPHILIZED, FOR SOLUTION INTRAVENOUS ONCE
Status: COMPLETED | OUTPATIENT
Start: 2023-08-31 | End: 2023-08-31

## 2023-08-31 RX ORDER — AMOXICILLIN AND CLAVULANATE POTASSIUM 875; 125 MG/1; MG/1
1 TABLET, FILM COATED ORAL 2 TIMES DAILY
Qty: 14 TABLET | Refills: 0 | Status: SHIPPED | OUTPATIENT
Start: 2023-08-31 | End: 2023-09-07

## 2023-08-31 RX ORDER — PANTOPRAZOLE SODIUM 40 MG/1
40 TABLET, DELAYED RELEASE ORAL DAILY
Qty: 30 TABLET | Refills: 0 | Status: SHIPPED | OUTPATIENT
Start: 2023-08-31

## 2023-08-31 RX ORDER — METOPROLOL TARTRATE 50 MG/1
25 TABLET, FILM COATED ORAL ONCE
Status: COMPLETED | OUTPATIENT
Start: 2023-08-31 | End: 2023-08-31

## 2023-08-31 RX ORDER — MAGNESIUM HYDROXIDE/ALUMINUM HYDROXICE/SIMETHICONE 120; 1200; 1200 MG/30ML; MG/30ML; MG/30ML
5 SUSPENSION ORAL EVERY 6 HOURS PRN
COMMUNITY

## 2023-08-31 RX ORDER — PANTOPRAZOLE SODIUM 40 MG/10ML
40 INJECTION, POWDER, LYOPHILIZED, FOR SOLUTION INTRAVENOUS DAILY
Status: DISCONTINUED | OUTPATIENT
Start: 2023-09-01 | End: 2023-08-31

## 2023-08-31 RX ADMIN — AMOXICILLIN AND CLAVULANATE POTASSIUM 1 TABLET: 875; 125 TABLET, FILM COATED ORAL at 23:17

## 2023-08-31 RX ADMIN — IOPAMIDOL 100 ML: 755 INJECTION, SOLUTION INTRAVENOUS at 22:13

## 2023-08-31 RX ADMIN — PANTOPRAZOLE SODIUM 40 MG: 40 INJECTION, POWDER, FOR SOLUTION INTRAVENOUS at 22:00

## 2023-08-31 RX ADMIN — METOPROLOL TARTRATE 25 MG: 50 TABLET, FILM COATED ORAL at 23:16

## 2023-08-31 ASSESSMENT — ENCOUNTER SYMPTOMS
COUGH: 1
SHORTNESS OF BREATH: 1
NAUSEA: 0
WHEEZING: 0
ABDOMINAL PAIN: 1
SORE THROAT: 0

## 2023-08-31 ASSESSMENT — LIFESTYLE VARIABLES
HOW OFTEN DO YOU HAVE A DRINK CONTAINING ALCOHOL: NEVER
HOW MANY STANDARD DRINKS CONTAINING ALCOHOL DO YOU HAVE ON A TYPICAL DAY: PATIENT DOES NOT DRINK

## 2023-08-31 ASSESSMENT — PAIN SCALES - GENERAL: PAINLEVEL_OUTOF10: 2

## 2023-09-01 LAB
AMORPH SED URNS QL MICRO: ABNORMAL
BACTERIA URNS QL MICRO: ABNORMAL
BILIRUB UR QL STRIP.AUTO: NEGATIVE
CASTS #/AREA URNS LPF: ABNORMAL /LPF
CHARACTER UR: CLEAR
COLOR UR AUTO: YELLOW
CRYSTALS VITF MICRO: ABNORMAL
EKG Q-T INTERVAL: 288 MS
EKG QRS DURATION: 106 MS
EKG QTC CALCULATION (BAZETT): 401 MS
EKG R AXIS: -31 DEGREES
EKG T AXIS: 98 DEGREES
EKG VENTRICULAR RATE: 117 BPM
EPI CELLS #/AREA URNS HPF: ABNORMAL /HPF
GLUCOSE UR QL STRIP.AUTO: NEGATIVE MG/DL
HGB UR STRIP.AUTO-MCNC: ABNORMAL MG/L
KETONES UR QL STRIP.AUTO: NEGATIVE
LEUKOCYTE ESTERASE UR QL STRIP.AUTO: NEGATIVE
MUCOUS THREADS URNS QL MICRO: ABNORMAL
NITRITE UR QL STRIP.AUTO: NEGATIVE
PH UR STRIP.AUTO: 7 [PH] (ref 5–9)
PROT UR STRIP.AUTO-MCNC: NEGATIVE MG/DL
RBC #/AREA URNS HPF: ABNORMAL /HPF
REFLEX TO URINE C & S: ABNORMAL
SP GR UR STRIP.AUTO: 1.01 (ref 1–1.03)
UROBILINOGEN UR STRIP-ACNC: 0.2 EU/DL (ref 0–1)
WBC # UR STRIP.AUTO: ABNORMAL /HPF

## 2023-09-01 PROCEDURE — 93010 ELECTROCARDIOGRAM REPORT: CPT | Performed by: NUCLEAR MEDICINE

## 2023-09-01 NOTE — DISCHARGE INSTRUCTIONS
Take the antibiotic as prescribed for the congestion in your left lung. Take pantoprazole 40 mg daily to reduce acid in your stomach. Take metoprolol 25 mg twice a day to help control your heart rate. Continue all other medications as prescribed. Return to the emergency department for worsening symptoms. Follow-up with your doctor for recheck.

## 2023-09-01 NOTE — ED PROVIDER NOTES
[address]  eMERGENCY dEPARTMENT eNCOUnter             [address] [facility] COMPLAINT    Chief Complaint   Patient presents with    Abdominal Pain    Gastroesophageal Reflux    Shortness of Breath       Nurses Notes reviewed and I agree except as noted in the HPI. HPI    Arleth Shook is a 79 y.o. male who presents stating that before eating his evening meal this evening at about 5:00 he developed some epigastric pain with nausea. He had had some pain earlier in the day, but this was worse. It went up into his chest \"like heartburn\". It made him short of breath. He tried to eat something, but that made his symptoms worse. He has a past history of atrial fibrillation/flutter. He is on Xarelto. He is on no rate controlling medications. Neither he nor his wife know whether he is always in atrial fibrillation/flutter, or if he sometimes goes into sinus rhythm. I reviewed his chart, and his last cardiology note does not really tell. On arrival, he is in atrial fibrillation with rate between 101 20. He denies any palpitations or resting dyspnea. He denies any current chest pain, he states that his \"indigestion \"pain is better. His wife had given him some Mylanta and some chewable Tums at home. REVIEW OF SYSTEMS      Review of Systems   Constitutional:  Positive for malaise/fatigue. Negative for diaphoresis and fever. HENT:  Positive for congestion. Negative for sore throat. Respiratory:  Positive for cough and shortness of breath (with exertion). Negative for wheezing. Cardiovascular:  Negative for palpitations and leg swelling. Gastrointestinal:  Positive for abdominal pain. Negative for nausea. Genitourinary:  Positive for frequency. Musculoskeletal:  Negative for falls. Neurological:  Positive for weakness. Negative for dizziness, focal weakness, loss of consciousness and headaches. Endo/Heme/Allergies:  Bruises/bleeds easily.    All other

## 2023-09-01 NOTE — ED NOTES
Pt assisted up to the bedside at this time with limited assist, continent of bladder x 1 with use of urinal, voided 200cc of urine, assisted back to bed, wife remains at the bedside, pt denies any c/o or needs.      Sera Gray RN  08/31/23 [id number]

## 2023-09-01 NOTE — ED NOTES
Discharge teaching and instructions for condition explained to patient. AVS reviewed. Went over prescriptions with patient. Patient voiced understanding regarding prescriptions, follow up appointments and care of self at home.  Pt discharged to home in stable condition  with spouse       Flower Araiza RN  09/01/23 0006

## 2024-01-25 ENCOUNTER — OFFICE VISIT (OUTPATIENT)
Dept: CARDIOLOGY CLINIC | Age: 84
End: 2024-01-25

## 2024-01-25 VITALS
SYSTOLIC BLOOD PRESSURE: 158 MMHG | BODY MASS INDEX: 30.12 KG/M2 | HEART RATE: 80 BPM | DIASTOLIC BLOOD PRESSURE: 72 MMHG | HEIGHT: 70 IN | WEIGHT: 210.4 LBS

## 2024-01-25 DIAGNOSIS — I48.91 ATRIAL FIBRILLATION, UNSPECIFIED TYPE (HCC): Primary | ICD-10-CM

## 2024-01-25 RX ORDER — RIVASTIGMINE 4.6 MG/24H
1 PATCH, EXTENDED RELEASE TRANSDERMAL DAILY
COMMUNITY

## 2024-01-25 NOTE — PATIENT INSTRUCTIONS
Your nurses today were ORAL Aceves and GILMA Humphries  Your provider today was Dr. Roldan  Phone number: [phone]      You may receive a survey regarding the care you received during your visit.  Your input is valuable to us.  We encourage you to complete and return your survey.  We hope you will choose us in the future for your healthcare needs.

## 2024-01-25 NOTE — PROGRESS NOTES
1 year follow-up.   A-fib, they deny having any bleeding issues or falls.       
Discussed symptoms needing emergency care or those which require further medical attention.   Discussed diet/exercise/BP/weight loss/health lifestyle choices/lipids; the patient understands the goals and will try to comply.    Disposition:  prn           Electronically signed by Ayad Roldan MD   1/25/2024 at 11:42 AM EST

## 2024-02-07 ENCOUNTER — OFFICE VISIT (OUTPATIENT)
Dept: RHEUMATOLOGY | Age: 84
End: 2024-02-07
Payer: MEDICARE

## 2024-02-07 VITALS
OXYGEN SATURATION: 95 % | HEART RATE: 77 BPM | WEIGHT: 213.2 LBS | HEIGHT: 70 IN | BODY MASS INDEX: 30.52 KG/M2 | SYSTOLIC BLOOD PRESSURE: 118 MMHG | DIASTOLIC BLOOD PRESSURE: 60 MMHG

## 2024-02-07 DIAGNOSIS — D72.821 MONOCYTOSIS: ICD-10-CM

## 2024-02-07 DIAGNOSIS — R53.83 FATIGUE, UNSPECIFIED TYPE: ICD-10-CM

## 2024-02-07 DIAGNOSIS — R79.89 TSH ELEVATION: ICD-10-CM

## 2024-02-07 DIAGNOSIS — M25.50 POLYARTHRALGIA: Primary | ICD-10-CM

## 2024-02-07 DIAGNOSIS — R77.8 ELEVATED TOTAL PROTEIN: ICD-10-CM

## 2024-02-07 DIAGNOSIS — R79.82 CRP ELEVATED: ICD-10-CM

## 2024-02-07 PROCEDURE — G8484 FLU IMMUNIZE NO ADMIN: HCPCS | Performed by: INTERNAL MEDICINE

## 2024-02-07 PROCEDURE — 1036F TOBACCO NON-USER: CPT | Performed by: INTERNAL MEDICINE

## 2024-02-07 PROCEDURE — G8427 DOCREV CUR MEDS BY ELIG CLIN: HCPCS | Performed by: INTERNAL MEDICINE

## 2024-02-07 PROCEDURE — 99204 OFFICE O/P NEW MOD 45 MIN: CPT | Performed by: INTERNAL MEDICINE

## 2024-02-07 PROCEDURE — G8417 CALC BMI ABV UP PARAM F/U: HCPCS | Performed by: INTERNAL MEDICINE

## 2024-02-07 PROCEDURE — 1123F ACP DISCUSS/DSCN MKR DOCD: CPT | Performed by: INTERNAL MEDICINE

## 2024-02-07 NOTE — PROGRESS NOTES
[facility] RHEUMATOLOGY CONSULT   Date Of Service: 2/7/2024  Provider: KASSY LANGFORD DO, DO  Name: Rory Grullon   MRN: [id number]    Chief Complaint(s)      Chief Complaint   Patient presents with    New Patient     Polyosteoarthritis        History of Present illness (HPI)    Rory Grullon   is a(n)83 y.o. male with a hx of  has a past medical history of Atrial flutter (HCC), Cancer of appendix (HCC), Diabetes mellitus (HCC), History of kidney stones, Hyperlipidemia, and Hypertension.   referred Alzheimer's Dementia by Robbie Damon APRN - * for evaluation of osteoarthritis  of multiple joints      Wife reporting episodic flares started around 10 years ago - started in the shoulder then progressed to the hips with limited ROM.  ER evaluation - with steroid taper and injection providing relief. Sed rate march 2013 @ 95 mm/hr during the evaluation. Reports continued pain in the shoulder, knees , wrists, hands - randomly occurring. Falres are associated with pain decreased ROM.  Flares occurring around 1-2 times per month - lasting a few days in each areas. Wife reported decrease ROM but no over joint swelling or redness.   Prior tx: diclofenac, tylenol (no relief) , dlcofocenac gel 3%, icee hot , chiropractor therapy  4 children    Intermittent daily pain - no particiular timing. Pain   Aggravating factors: wrists  /movement   shoulder - movement during geeta.   Alleviating factors: corticosteroids, NSAID (po and topical) , tylenol arthritis   Denies morning stiffness, joint swelling   Some neuropathy in the feet related to T2DM   Thickening toenails for several years.         -denies Photosenstivity, Rash, dry mouth/dry eyes, oral/nasal sores, Raynaud's, digital ulcerations, skin tightening, renal disease,foamy urination, hematuria, sz's, blood clots, AIHA,leukpenia/lymphopenia, thrombocytopenia, hair loss, serositis,  enthesitis, dactylitis, nail changes, hx of STD,  personal or family history of

## 2024-02-09 ENCOUNTER — HOSPITAL ENCOUNTER (OUTPATIENT)
Dept: MRI IMAGING | Age: 84
Discharge: HOME | End: 2024-02-09
Attending: RADIOLOGY

## 2024-02-09 DIAGNOSIS — Z00.6 ENCOUNTER FOR EXAMINATION FOR NORMAL COMPARISON OR CONTROL IN CLINICAL RESEARCH PROGRAM: ICD-10-CM

## 2024-02-12 ENCOUNTER — TELEPHONE (OUTPATIENT)
Dept: RHEUMATOLOGY | Age: 84
End: 2024-02-12

## 2024-02-12 ENCOUNTER — HOSPITAL ENCOUNTER (OUTPATIENT)
Dept: GENERAL RADIOLOGY | Age: 84
Discharge: HOME | End: 2024-02-12
Payer: MEDICARE

## 2024-02-12 ENCOUNTER — HOSPITAL ENCOUNTER (OUTPATIENT)
Age: 84
Discharge: HOME | End: 2024-02-12
Payer: MEDICARE

## 2024-02-12 DIAGNOSIS — R79.89 TSH ELEVATION: ICD-10-CM

## 2024-02-12 DIAGNOSIS — R79.82 CRP ELEVATED: ICD-10-CM

## 2024-02-12 DIAGNOSIS — D72.821 MONOCYTOSIS: ICD-10-CM

## 2024-02-12 DIAGNOSIS — R77.8 ELEVATED TOTAL PROTEIN: ICD-10-CM

## 2024-02-12 DIAGNOSIS — R53.83 FATIGUE, UNSPECIFIED TYPE: ICD-10-CM

## 2024-02-12 DIAGNOSIS — M25.50 POLYARTHRALGIA: ICD-10-CM

## 2024-02-12 LAB
CRP SERPL-MCNC: < 0.3 MG/DL (ref 0–1)
ERYTHROCYTE [SEDIMENTATION RATE] IN BLOOD BY WESTERGREN METHOD: 50 MM/HR (ref 0–10)
IRON SATN MFR SERPL: 38 % (ref 20–50)
IRON SERPL-MCNC: 100 UG/DL (ref 65–195)
MAGNESIUM SERPL-MCNC: 2.1 MG/DL (ref 1.6–2.4)
RHEUMATOID FACT SERPL-ACNC: 230 IU/ML (ref 0–13)
T4 FREE SERPL-MCNC: 1.1 NG/DL (ref 0.93–1.76)
TIBC SERPL-MCNC: 261 UG/DL (ref 171–450)
TSH SERPL DL<=0.005 MIU/L-ACNC: 6.42 UIU/ML (ref 0.4–4.2)
URATE SERPL-MCNC: 5.4 MG/DL (ref 3.7–7)

## 2024-02-12 PROCEDURE — 86335 IMMUNFIX E-PHORSIS/URINE/CSF: CPT

## 2024-02-12 PROCEDURE — 84165 PROTEIN E-PHORESIS SERUM: CPT

## 2024-02-12 PROCEDURE — 83550 IRON BINDING TEST: CPT

## 2024-02-12 PROCEDURE — 73030 X-RAY EXAM OF SHOULDER: CPT

## 2024-02-12 PROCEDURE — 83540 ASSAY OF IRON: CPT

## 2024-02-12 PROCEDURE — 36415 COLL VENOUS BLD VENIPUNCTURE: CPT

## 2024-02-12 PROCEDURE — 84156 ASSAY OF PROTEIN URINE: CPT

## 2024-02-12 PROCEDURE — 83735 ASSAY OF MAGNESIUM: CPT

## 2024-02-12 PROCEDURE — 84550 ASSAY OF BLOOD/URIC ACID: CPT

## 2024-02-12 PROCEDURE — 84155 ASSAY OF PROTEIN SERUM: CPT

## 2024-02-12 PROCEDURE — 84439 ASSAY OF FREE THYROXINE: CPT

## 2024-02-12 PROCEDURE — 86140 C-REACTIVE PROTEIN: CPT

## 2024-02-12 PROCEDURE — 73130 X-RAY EXAM OF HAND: CPT

## 2024-02-12 PROCEDURE — 84443 ASSAY THYROID STIM HORMONE: CPT

## 2024-02-12 PROCEDURE — 85651 RBC SED RATE NONAUTOMATED: CPT

## 2024-02-12 PROCEDURE — 86235 NUCLEAR ANTIGEN ANTIBODY: CPT

## 2024-02-12 PROCEDURE — 86200 CCP ANTIBODY: CPT

## 2024-02-12 PROCEDURE — 86430 RHEUMATOID FACTOR TEST QUAL: CPT

## 2024-02-12 PROCEDURE — 84166 PROTEIN E-PHORESIS/URINE/CSF: CPT

## 2024-02-12 NOTE — TELEPHONE ENCOUNTER
----- Message from Ace Jackson DO sent at 2/12/2024  3:46 PM EST -----  The thyroid testing did reveal some abnormalities.  I would recommend follow-up with primary care provider to evaluate for potential treatment options if warranted.      There is no elevation of the sed rate which can indicate systemic inflammation    Several labs are still pending once they have returned you will be notified of any abnormalities.

## 2024-02-13 ENCOUNTER — TELEPHONE (OUTPATIENT)
Dept: RHEUMATOLOGY | Age: 84
End: 2024-02-13

## 2024-02-13 NOTE — TELEPHONE ENCOUNTER
----- Message from Ace Jackson DO sent at 2/12/2024  4:32 PM EST -----  The x-ray of the shoulder revealed mild degenerative arthritis within the glenohumeral joint and moderate acromioclavicular joint osteoarthritis.

## 2024-02-13 NOTE — TELEPHONE ENCOUNTER
----- Message from Ace Jackson DO sent at 2/13/2024  1:31 PM EST -----  There is significant narrowing of the second metacarpal joint (joint near the base of the second finger) along with mild degenerative arthritis throughout the the rest of the right hand and wrist..  The degenerative arthritis affecting the base and level can be seen with prior injury with rheumatoid arthritis or other inflammatory arthritic conditions.

## 2024-02-13 NOTE — TELEPHONE ENCOUNTER
----- Message from Ace Jackson DO sent at 2/13/2024  1:30 PM EST -----  Lab testing noted his rheumatoid factor was strongly positive.  This is concerning for possible rheumatoid arthritis.  Several tests are currently pending once they have returned you will be notified of any abnormalities.

## 2024-02-14 ENCOUNTER — TELEPHONE (OUTPATIENT)
Dept: RHEUMATOLOGY | Age: 84
End: 2024-02-14

## 2024-02-14 DIAGNOSIS — M25.50 POLYARTHRALGIA: Primary | ICD-10-CM

## 2024-02-14 DIAGNOSIS — R53.83 FATIGUE, UNSPECIFIED TYPE: ICD-10-CM

## 2024-02-14 LAB
CYCLIC CITRULLINATED PEPTIDE ANTIBODY IGG: > 340 U/ML (ref 0–7)
PROTEIN ELECTROPHORESIS, SERUM: NORMAL

## 2024-02-14 NOTE — TELEPHONE ENCOUNTER
----- Message from Ace Jackson DO sent at 2/14/2024  8:25 AM EST -----  The blood testing is consistent with a diagnosis of rheumatoid arthritis.  I would like to pursue a medication of his methotrexate which is taken once weekly along with a daily folic acid.  Prior to pursuing this option however we would need to have baseline evaluation for prior viral hepatitis exposure.    Side effects include but ar not limited to : abd pain, nausea, diarrhea, fatigue, low blood counts, liver injury, increased risk of infection. RARE: nodulosis, increased risk of lymphoma and non melanoma skin cancer, pulmonary scaring/inflammation.

## 2024-02-14 NOTE — TELEPHONE ENCOUNTER
Called pt and discussed the medication with his wife and they are willing to start the medication.

## 2024-02-14 NOTE — TELEPHONE ENCOUNTER
The order for the hepatitis panel was placed. Need patient to have this completed prior to starting the medication.

## 2024-02-15 LAB
ENA SS-A IGG SER IA-ACNC: NORMAL
ENA SS-B IGG SER IA-ACNC: 0 AU/ML (ref 0–40)
PROTEIN ELECTROPHORESIS, URINE: NORMAL

## 2024-02-15 NOTE — RESULT ENCOUNTER NOTE
Serum protein electrophoresis polyclonal gammopathy.  Likely related to underlying rheumatoid arthritis.

## 2024-02-19 ENCOUNTER — TELEPHONE (OUTPATIENT)
Dept: RHEUMATOLOGY | Age: 84
End: 2024-02-19

## 2024-02-19 DIAGNOSIS — R76.8 RHEUMATOID FACTOR POSITIVE: Primary | ICD-10-CM

## 2024-02-19 DIAGNOSIS — Z51.81 MEDICATION MONITORING ENCOUNTER: ICD-10-CM

## 2024-02-19 RX ORDER — FOLIC ACID 1 MG/1
1 TABLET ORAL DAILY
Qty: 90 TABLET | Refills: 1 | Status: SHIPPED | OUTPATIENT
Start: 2024-02-19

## 2024-02-19 NOTE — TELEPHONE ENCOUNTER
----- Message from Ace Jackson DO sent at 2/19/2024 12:07 PM EST -----  The evaluation prior hepatitis was negative.  The prescription for methotrexate once weekly and folic acid once daily has been sent to your pharmacy.  Please have your blood test repeated in 3 to 4 weeks

## 2024-03-15 ENCOUNTER — HOSPITAL ENCOUNTER (OUTPATIENT)
Dept: INFUSION THERAPY | Age: 84
Discharge: HOME | End: 2024-03-15
Payer: MEDICARE

## 2024-03-15 ENCOUNTER — OFFICE VISIT (OUTPATIENT)
Dept: ONCOLOGY | Age: 84
End: 2024-03-15
Payer: MEDICARE

## 2024-03-15 VITALS
DIASTOLIC BLOOD PRESSURE: 70 MMHG | HEART RATE: 68 BPM | TEMPERATURE: 97.4 F | RESPIRATION RATE: 18 BRPM | SYSTOLIC BLOOD PRESSURE: 117 MMHG

## 2024-03-15 VITALS
OXYGEN SATURATION: 94 % | SYSTOLIC BLOOD PRESSURE: 117 MMHG | TEMPERATURE: 97.4 F | RESPIRATION RATE: 18 BRPM | DIASTOLIC BLOOD PRESSURE: 70 MMHG | HEIGHT: 70 IN | WEIGHT: 213 LBS | HEART RATE: 68 BPM | BODY MASS INDEX: 30.49 KG/M2

## 2024-03-15 DIAGNOSIS — Z85.46 HISTORY OF PROSTATE CANCER: ICD-10-CM

## 2024-03-15 DIAGNOSIS — D37.3 LOW GRADE MUCINOUS NEOPLASM OF APPENDIX: Primary | ICD-10-CM

## 2024-03-15 PROCEDURE — G8484 FLU IMMUNIZE NO ADMIN: HCPCS | Performed by: NURSE PRACTITIONER

## 2024-03-15 PROCEDURE — 1036F TOBACCO NON-USER: CPT | Performed by: NURSE PRACTITIONER

## 2024-03-15 PROCEDURE — 99213 OFFICE O/P EST LOW 20 MIN: CPT | Performed by: NURSE PRACTITIONER

## 2024-03-15 PROCEDURE — G8427 DOCREV CUR MEDS BY ELIG CLIN: HCPCS | Performed by: NURSE PRACTITIONER

## 2024-03-15 PROCEDURE — 99211 OFF/OP EST MAY X REQ PHY/QHP: CPT

## 2024-03-15 PROCEDURE — G8417 CALC BMI ABV UP PARAM F/U: HCPCS | Performed by: NURSE PRACTITIONER

## 2024-03-15 PROCEDURE — 1123F ACP DISCUSS/DSCN MKR DOCD: CPT | Performed by: NURSE PRACTITIONER

## 2024-03-15 NOTE — PROGRESS NOTES
[facility] [place] [facility]  [facility]  [address]  [place] [id number]  Dept: [phone]  Loc: [phone]       Visit Date:3/15/2024     Rory Grullon is a 83 y.o. male who presents today for:   Chief Complaint   Patient presents with    Follow-up     Low grade mucinous neoplasm of appendix        HPI:   Rory Grullon is a 83 y.o. male with a history of appendiceal and prostate cancer.  The patient has a history of atrial flutter, HTN, hyperlipidemia and diabetes.    04/23/2018 CT scan of the abdomen showed diffuse dilatation of the appendix measuring 1.8 cm, no evidence for acute appendicitis.  Mild enlargement of the prostate gland with mild diffuse thickening of the urinary bladder wall which may represent bladder outlet obstruction, calcified granuloma in the left lung base and severe degenerative disc disease at L5-S1.    05/31/2018 diagnostic laparoscopic with appendectomy pathology confirmed low-grade appendiceal mucinous neoplasm, proximal margin involved.    06/07/2018 the patient was seen by urology for evaluation of urinary retention following appendectomy.  He was noted to have a history of BPH on treatment with Flomax and finasteride.  He was recommended cystoscopy evaluation of the bladder wall thickening seen on the CT scan.    06/18/2018 the patient underwent cystoscopy with Dr. Carrasco and was noted to have a moderately obstructing prostate and trabeculations.  He was also noted to have a nonobstructive left renal stone and elected to proceed with cystoscopy, possible left ureteroscopy with possible laser lithotripsy/basket retrieval/left ureteral stent placement.    06/21/2018 the patient underwent a right colon resection due to positive margins.  12 pericolonic lymph nodes were negative for malignancy.    07/05/2018 the patient was seen for initial evaluation with Dr. Diaz.  Noted that low-grade tumors are treated surgically

## 2024-03-15 NOTE — PATIENT INSTRUCTIONS
Return to clinic for follow up in one year (Dr. Del Rio)  Notify the office of any new or worsening symptoms

## 2024-03-20 ENCOUNTER — HOSPITAL ENCOUNTER (OUTPATIENT)
Age: 84
Discharge: HOME | End: 2024-03-20
Payer: MEDICARE

## 2024-03-20 DIAGNOSIS — Z51.81 MEDICATION MONITORING ENCOUNTER: ICD-10-CM

## 2024-03-20 DIAGNOSIS — C61 PROSTATE CANCER (HCC): ICD-10-CM

## 2024-03-20 DIAGNOSIS — M19.90 INFLAMMATORY ARTHRITIS: Primary | ICD-10-CM

## 2024-03-20 LAB
ALBUMIN SERPL BCP-MCNC: 3.4 GM/DL (ref 3.4–5)
ALP SERPL-CCNC: 51 U/L (ref 46–116)
ALT SERPL W P-5'-P-CCNC: 19 U/L (ref 14–63)
ANION GAP SERPL CALC-SCNC: 6 MEQ/L (ref 8–16)
AST SERPL W P-5'-P-CCNC: 16 U/L (ref 15–37)
BASOPHILS # BLD: 0.8 % (ref 0–3)
BASOPHILS ABSOLUTE: 0 THOU/MM3 (ref 0–0.1)
BILIRUB SERPL-MCNC: 0.4 MG/DL (ref 0.2–1)
BUN SERPL-MCNC: 13 MG/DL (ref 7–18)
CALCIUM SERPL-MCNC: 8.7 MG/DL (ref 8.5–10.1)
CHLORIDE SERPL-SCNC: 101 MEQ/L (ref 98–107)
CO2 SERPL-SCNC: 29 MEQ/L (ref 21–32)
CREAT SERPL-MCNC: 1.2 MG/DL (ref 0.6–1.3)
CRP SERPL-MCNC: < 0.3 MG/DL (ref 0–1)
EOSINOPHILS ABSOLUTE: 0.2 THOU/MM3 (ref 0–0.5)
EOSINOPHILS RELATIVE PERCENT: 4.6 % (ref 0–4)
ERYTHROCYTE [SEDIMENTATION RATE] IN BLOOD BY WESTERGREN METHOD: 38 MM/HR (ref 0–10)
GFR SERPL CREATININE-BSD FRML MDRD: 59 ML/MIN/1.73M2
GLUCOSE SERPL-MCNC: 119 MG/DL (ref 74–106)
HCT VFR BLD CALC: 40.9 % (ref 42–52)
HEMOGLOBIN: 13.2 GM/DL (ref 14–18)
IMMATURE GRANS (ABS): 0 THOU/MM3 (ref 0–0.07)
IMMATURE GRANULOCYTES: 0 %
LYMPHOCYTES # BLD AUTO: 24.7 % (ref 15–47)
LYMPHOCYTES ABSOLUTE: 1.2 THOU/MM3 (ref 1–4.8)
MCH RBC QN AUTO: 30.1 PG (ref 26–32)
MCHC RBC AUTO-ENTMCNC: 32.3 GM/DL (ref 31–35)
MCV RBC AUTO: 93.2 FL (ref 80–94)
MONOCYTES: 0.7 THOU/MM3 (ref 0.3–1.3)
MONOCYTES: 14.3 % (ref 0–12)
PDW BLD-RTO: 14.7 % (ref 11.5–14.9)
PLATELET # BLD AUTO: 226 THOU/MM3 (ref 130–400)
PMV BLD AUTO: 9.6 FL (ref 9.4–12.4)
POTASSIUM SERPL-SCNC: 4 MEQ/L (ref 3.5–5.1)
PROT SERPL-MCNC: 7.7 GM/DL (ref 6.4–8.2)
PSA SERPL-MCNC: < 0.02 NG/ML (ref 0–1)
RBC # BLD: 4.39 MILL/MM3 (ref 4.5–6.1)
SEG NEUTROPHILS: 55.4 % (ref 43–75)
SEGMENTED NEUTROPHILS ABSOLUTE COUNT: 2.7 THOU/MM3 (ref 1.8–7.7)
SODIUM SERPL-SCNC: 136 MEQ/L (ref 136–145)
WBC # BLD: 4.8 THOU/MM3 (ref 4.8–10.8)

## 2024-03-20 PROCEDURE — 86140 C-REACTIVE PROTEIN: CPT

## 2024-03-20 PROCEDURE — 80053 COMPREHEN METABOLIC PANEL: CPT

## 2024-03-20 PROCEDURE — 85025 COMPLETE CBC W/AUTO DIFF WBC: CPT

## 2024-03-20 PROCEDURE — 84153 ASSAY OF PSA TOTAL: CPT

## 2024-03-20 PROCEDURE — 36415 COLL VENOUS BLD VENIPUNCTURE: CPT

## 2024-03-20 PROCEDURE — 85651 RBC SED RATE NONAUTOMATED: CPT

## 2024-03-20 RX ORDER — METHOTREXATE 2.5 MG/1
10 TABLET ORAL WEEKLY
Qty: 16 TABLET | Refills: 0 | Status: SHIPPED | OUTPATIENT
Start: 2024-03-20 | End: 2024-04-19

## 2024-03-21 ENCOUNTER — TELEPHONE (OUTPATIENT)
Dept: RHEUMATOLOGY | Age: 84
End: 2024-03-21

## 2024-03-21 NOTE — TELEPHONE ENCOUNTER
----- Message from Ace Jackson DO sent at 3/20/2024  5:00 PM EDT -----  Lab testing revealing improvement of the inflammatory marker noted sed rate.  The anemia and kidney functions are stable compared to prior evaluations.  Please have your labs repeated in 4 weeks

## 2024-03-28 ENCOUNTER — OFFICE VISIT (OUTPATIENT)
Dept: UROLOGY | Age: 84
End: 2024-03-28
Payer: MEDICARE

## 2024-03-28 VITALS
DIASTOLIC BLOOD PRESSURE: 68 MMHG | HEIGHT: 70 IN | SYSTOLIC BLOOD PRESSURE: 120 MMHG | WEIGHT: 213 LBS | BODY MASS INDEX: 30.49 KG/M2

## 2024-03-28 DIAGNOSIS — C61 PROSTATE CANCER (HCC): Primary | ICD-10-CM

## 2024-03-28 PROCEDURE — G8427 DOCREV CUR MEDS BY ELIG CLIN: HCPCS | Performed by: NURSE PRACTITIONER

## 2024-03-28 PROCEDURE — 1036F TOBACCO NON-USER: CPT | Performed by: NURSE PRACTITIONER

## 2024-03-28 PROCEDURE — 99214 OFFICE O/P EST MOD 30 MIN: CPT | Performed by: NURSE PRACTITIONER

## 2024-03-28 PROCEDURE — 1123F ACP DISCUSS/DSCN MKR DOCD: CPT | Performed by: NURSE PRACTITIONER

## 2024-03-28 PROCEDURE — G8484 FLU IMMUNIZE NO ADMIN: HCPCS | Performed by: NURSE PRACTITIONER

## 2024-03-28 PROCEDURE — G8417 CALC BMI ABV UP PARAM F/U: HCPCS | Performed by: NURSE PRACTITIONER

## 2024-03-28 RX ORDER — TROSPIUM CHLORIDE 20 MG/1
20 TABLET, FILM COATED ORAL NIGHTLY
Qty: 90 TABLET | Refills: 3 | Status: SHIPPED | OUTPATIENT
Start: 2024-03-28

## 2024-03-28 ASSESSMENT — ENCOUNTER SYMPTOMS
NAUSEA: 0
ABDOMINAL PAIN: 0
BACK PAIN: 0
VOMITING: 0

## 2024-03-28 NOTE — PROGRESS NOTES
[facility] [place] [facility]  [facility] UROLOGY  [address]  [address]  [facility] [address]  Dept: [phone]  Loc: [phone]    Visit Date: 3/28/2024        HPI:     Rory Grullon is a 83 y.o. male who presents today for:  Chief Complaint   Patient presents with    Prostate Cancer    Follow-up     Follow up, psa done prior       HPI    Pt seen in follow up for prostate cancer     Mr. Grullon has a hx of prostate cancer s/p Robotic-Assisted Laparoscopic Radical Prostatectomy (RALRP) and bilateral pelvic lymph node dissection and bilateral nerve wrap 9/4/19 by Dr. Carrasco.  Final pathology resulted Abel 4+3=7 invasive moderately differentiated adenocarcinoma with extraprostatic extension, seminal vesicles and lymph nodes negative for neoplasm.  Grade group 3.  PT3a, pN0.  Preoperative decipher testing high risk.  PSA 2/23/23 <0.02.     Pt also has a hx of appendiceal ca s/p resection.  Follows with medical oncology.  Reports no evidence of recurrence thus far.      Having increased nocturia.  Constant small amount of incontinence.  Wears depends.      Current Outpatient Medications   Medication Sig Dispense Refill    methotrexate (RHEUMATREX) 2.5 MG chemo tablet Take 4 tablets by mouth once a week 16 tablet 0    folic acid (FOLVITE) 1 MG tablet Take 1 tablet by mouth daily 90 tablet 1    diclofenac sodium (VOLTAREN) 1 % GEL Apply topically 4 times daily as needed for Pain      rivastigmine (EXELON) 4.6 MG/24HR Place 1 patch onto the skin daily      calcium carbonate (TUMS) 500 MG chewable tablet Take 1 tablet by mouth daily      aluminum & magnesium hydroxide-simethicone (MAALOX) 200-200-20 MG/5ML SUSP suspension Take 5 mLs by mouth every 6 hours as needed for Indigestion      pantoprazole (PROTONIX) 40 MG tablet Take 1 tablet by mouth daily For acid in your stomach 30 tablet 0    metoprolol tartrate (LOPRESSOR) 25 MG tablet Take 1 tablet by mouth 2 times daily To help

## 2024-04-17 ENCOUNTER — HOSPITAL ENCOUNTER (OUTPATIENT)
Age: 84
Discharge: HOME | End: 2024-04-17
Payer: MEDICARE

## 2024-04-17 ENCOUNTER — TELEPHONE (OUTPATIENT)
Dept: RHEUMATOLOGY | Age: 84
End: 2024-04-17

## 2024-04-17 DIAGNOSIS — Z51.81 MEDICATION MONITORING ENCOUNTER: ICD-10-CM

## 2024-04-17 DIAGNOSIS — M19.90 INFLAMMATORY ARTHRITIS: ICD-10-CM

## 2024-04-17 LAB
ALBUMIN SERPL BCG-MCNC: 4.1 G/DL (ref 3.5–5.1)
ALP SERPL-CCNC: 50 U/L (ref 38–126)
ALT SERPL W/O P-5'-P-CCNC: 19 U/L (ref 11–66)
ANION GAP SERPL CALC-SCNC: 10 MEQ/L (ref 8–16)
AST SERPL-CCNC: 20 U/L (ref 5–40)
BASOPHILS ABSOLUTE: 0 THOU/MM3 (ref 0–0.1)
BASOPHILS NFR BLD AUTO: 0.8 %
BILIRUB SERPL-MCNC: 0.4 MG/DL (ref 0.3–1.2)
BUN SERPL-MCNC: 20 MG/DL (ref 7–22)
CALCIUM SERPL-MCNC: 8.9 MG/DL (ref 8.5–10.5)
CHLORIDE SERPL-SCNC: 99 MEQ/L (ref 98–111)
CO2 SERPL-SCNC: 25 MEQ/L (ref 23–33)
CREAT SERPL-MCNC: 1.2 MG/DL (ref 0.4–1.2)
CRP SERPL-MCNC: < 0.3 MG/DL (ref 0–1)
DEPRECATED RDW RBC AUTO: 55.7 FL (ref 35–45)
EOSINOPHIL NFR BLD AUTO: 3.9 %
EOSINOPHILS ABSOLUTE: 0.2 THOU/MM3 (ref 0–0.4)
ERYTHROCYTE [DISTWIDTH] IN BLOOD BY AUTOMATED COUNT: 16 % (ref 11.5–14.5)
ERYTHROCYTE [SEDIMENTATION RATE] IN BLOOD BY WESTERGREN METHOD: 33 MM/HR (ref 0–10)
GFR SERPL CREATININE-BSD FRML MDRD: 59 ML/MIN/1.73M2
GLUCOSE SERPL-MCNC: 117 MG/DL (ref 70–108)
HCT VFR BLD AUTO: 39.2 % (ref 42–52)
HGB BLD-MCNC: 12.9 GM/DL (ref 14–18)
IMM GRANULOCYTES # BLD AUTO: 0.02 THOU/MM3 (ref 0–0.07)
IMM GRANULOCYTES NFR BLD AUTO: 0.3 %
LYMPHOCYTES ABSOLUTE: 1.3 THOU/MM3 (ref 1–4.8)
LYMPHOCYTES NFR BLD AUTO: 22.1 %
MCH RBC QN AUTO: 31.5 PG (ref 26–33)
MCHC RBC AUTO-ENTMCNC: 32.9 GM/DL (ref 32.2–35.5)
MCV RBC AUTO: 95.8 FL (ref 80–94)
MONOCYTES ABSOLUTE: 1.2 THOU/MM3 (ref 0.4–1.3)
MONOCYTES NFR BLD AUTO: 19.6 %
NEUTROPHILS NFR BLD AUTO: 53.3 %
NRBC BLD AUTO-RTO: 0 /100 WBC
PLATELET # BLD AUTO: 235 THOU/MM3 (ref 130–400)
PMV BLD AUTO: 10.3 FL (ref 9.4–12.4)
POTASSIUM SERPL-SCNC: 4.4 MEQ/L (ref 3.5–5.2)
PROT SERPL-MCNC: 7.3 G/DL (ref 6.1–8)
RBC # BLD AUTO: 4.09 MILL/MM3 (ref 4.7–6.1)
SEGMENTED NEUTROPHILS ABSOLUTE COUNT: 3.2 THOU/MM3 (ref 1.8–7.7)
SODIUM SERPL-SCNC: 134 MEQ/L (ref 135–145)
WBC # BLD AUTO: 6 THOU/MM3 (ref 4.8–10.8)

## 2024-04-17 PROCEDURE — 86140 C-REACTIVE PROTEIN: CPT

## 2024-04-17 PROCEDURE — 85651 RBC SED RATE NONAUTOMATED: CPT

## 2024-04-17 PROCEDURE — 85025 COMPLETE CBC W/AUTO DIFF WBC: CPT

## 2024-04-17 PROCEDURE — 80053 COMPREHEN METABOLIC PANEL: CPT

## 2024-04-17 PROCEDURE — 36415 COLL VENOUS BLD VENIPUNCTURE: CPT

## 2024-04-17 RX ORDER — METHOTREXATE 2.5 MG/1
10 TABLET ORAL WEEKLY
Qty: 16 TABLET | Refills: 0 | Status: SHIPPED | OUTPATIENT
Start: 2024-04-17 | End: 2024-05-17

## 2024-04-17 NOTE — TELEPHONE ENCOUNTER
----- Message from SERVANDO Ceballos - CNP sent at 4/17/2024  3:46 PM EDT -----  Blood testing with a slightly worsened anemia, otherwise everything is stable. Methotrexate refilled. Repeat labs in 4 weeks.

## 2024-05-15 ENCOUNTER — HOSPITAL ENCOUNTER (OUTPATIENT)
Age: 84
Discharge: HOME | End: 2024-05-15
Payer: MEDICARE

## 2024-05-15 DIAGNOSIS — Z51.81 MEDICATION MONITORING ENCOUNTER: ICD-10-CM

## 2024-05-15 LAB
ALBUMIN SERPL BCG-MCNC: 3.9 G/DL (ref 3.5–5.1)
ALP SERPL-CCNC: 48 U/L (ref 38–126)
ALT SERPL W/O P-5'-P-CCNC: 12 U/L (ref 11–66)
ANION GAP SERPL CALC-SCNC: 10 MEQ/L (ref 8–16)
AST SERPL-CCNC: 18 U/L (ref 5–40)
BASOPHILS ABSOLUTE: 0.1 THOU/MM3 (ref 0–0.1)
BASOPHILS NFR BLD AUTO: 1.3 %
BILIRUB SERPL-MCNC: 0.5 MG/DL (ref 0.3–1.2)
BUN SERPL-MCNC: 18 MG/DL (ref 7–22)
CALCIUM SERPL-MCNC: 9.1 MG/DL (ref 8.5–10.5)
CHLORIDE SERPL-SCNC: 103 MEQ/L (ref 98–111)
CO2 SERPL-SCNC: 24 MEQ/L (ref 23–33)
CREAT SERPL-MCNC: 1.3 MG/DL (ref 0.4–1.2)
CRP SERPL-MCNC: < 0.3 MG/DL (ref 0–1)
DEPRECATED RDW RBC AUTO: 53.5 FL (ref 35–45)
EOSINOPHIL NFR BLD AUTO: 3.8 %
EOSINOPHILS ABSOLUTE: 0.2 THOU/MM3 (ref 0–0.4)
ERYTHROCYTE [DISTWIDTH] IN BLOOD BY AUTOMATED COUNT: 15.7 % (ref 11.5–14.5)
ERYTHROCYTE [SEDIMENTATION RATE] IN BLOOD BY WESTERGREN METHOD: 36 MM/HR (ref 0–10)
GFR SERPL CREATININE-BSD FRML MDRD: 54 ML/MIN/1.73M2
GLUCOSE SERPL-MCNC: 108 MG/DL (ref 70–108)
HCT VFR BLD AUTO: 37.7 % (ref 42–52)
HGB BLD-MCNC: 12.8 GM/DL (ref 14–18)
IMM GRANULOCYTES # BLD AUTO: 0.01 THOU/MM3 (ref 0–0.07)
IMM GRANULOCYTES NFR BLD AUTO: 0.2 %
LYMPHOCYTES ABSOLUTE: 1.5 THOU/MM3 (ref 1–4.8)
LYMPHOCYTES NFR BLD AUTO: 24.7 %
MCH RBC QN AUTO: 32.7 PG (ref 26–33)
MCHC RBC AUTO-ENTMCNC: 34 GM/DL (ref 32.2–35.5)
MCV RBC AUTO: 96.4 FL (ref 80–94)
MONOCYTES ABSOLUTE: 0.9 THOU/MM3 (ref 0.4–1.3)
MONOCYTES NFR BLD AUTO: 15.7 %
NEUTROPHILS ABSOLUTE: 3.3 THOU/MM3 (ref 1.8–7.7)
NEUTROPHILS NFR BLD AUTO: 54.3 %
NRBC BLD AUTO-RTO: 0 /100 WBC
PLATELET # BLD AUTO: 228 THOU/MM3 (ref 130–400)
PMV BLD AUTO: 10.5 FL (ref 9.4–12.4)
POTASSIUM SERPL-SCNC: 4 MEQ/L (ref 3.5–5.2)
PROT SERPL-MCNC: 7.3 G/DL (ref 6.1–8)
RBC # BLD AUTO: 3.91 MILL/MM3 (ref 4.7–6.1)
SODIUM SERPL-SCNC: 137 MEQ/L (ref 135–145)
WBC # BLD AUTO: 6 THOU/MM3 (ref 4.8–10.8)

## 2024-05-15 PROCEDURE — 86140 C-REACTIVE PROTEIN: CPT

## 2024-05-15 PROCEDURE — 85651 RBC SED RATE NONAUTOMATED: CPT

## 2024-05-15 PROCEDURE — 36415 COLL VENOUS BLD VENIPUNCTURE: CPT

## 2024-05-15 PROCEDURE — 85025 COMPLETE CBC W/AUTO DIFF WBC: CPT

## 2024-05-15 PROCEDURE — 80053 COMPREHEN METABOLIC PANEL: CPT

## 2024-05-16 ENCOUNTER — TELEPHONE (OUTPATIENT)
Dept: RHEUMATOLOGY | Age: 84
End: 2024-05-16

## 2024-05-16 DIAGNOSIS — M19.90 INFLAMMATORY ARTHRITIS: ICD-10-CM

## 2024-05-16 NOTE — TELEPHONE ENCOUNTER
----- Message from Ace Jackson DO sent at 5/15/2024  5:01 PM EDT -----  Labs do reveal mildly decreased kidney function test over the past 8 months.  No other significant abnormalities have returned.  Currently awaiting a few additional blood test to return

## 2024-05-16 NOTE — TELEPHONE ENCOUNTER
Wife notified and verbalized understanding. Wife requesting refill on methotrexate.     DOLV: 2/7/24  DONV: 7/31/24  LAST LAB DRAW: 5/15/24  LAST TB TEST: n/a    Lab Results   Component Value Date     05/15/2024    K 4.0 05/15/2024     05/15/2024    CO2 24 05/15/2024    BUN 18 05/15/2024    CREATININE 1.3 (H) 05/15/2024    GLUCOSE 108 05/15/2024    CALCIUM 9.1 05/15/2024    BILITOT 0.5 05/15/2024    ALKPHOS 48 05/15/2024    AST 18 05/15/2024    ALT 12 05/15/2024    LABGLOM 54 (A) 05/15/2024       Recent Labs     05/15/24  0827 04/17/24  0841   WBC 6.0 6.0   HGB 12.8* 12.9*   HCT 37.7* 39.2*   MCV 96.4* 95.8*    235       Lab Results   Component Value Date    SEDRATE 36 (H) 05/15/2024       Lab Results   Component Value Date    CRP < 0.30 05/15/2024

## 2024-05-16 NOTE — TELEPHONE ENCOUNTER
Pts wife informed and stated that he started a med for his bladder on 3/28/24 called Trospium Cl 20mg and is wondering if that could affect his kidney function.

## 2024-05-18 RX ORDER — METHOTREXATE 2.5 MG/1
10 TABLET ORAL WEEKLY
Qty: 16 TABLET | Refills: 0 | Status: SHIPPED | OUTPATIENT
Start: 2024-05-18 | End: 2024-06-17

## 2024-05-28 ENCOUNTER — OFFICE VISIT (OUTPATIENT)
Dept: UROLOGY | Age: 84
End: 2024-05-28
Payer: MEDICARE

## 2024-05-28 VITALS — WEIGHT: 220 LBS | HEIGHT: 70 IN | BODY MASS INDEX: 31.5 KG/M2 | RESPIRATION RATE: 22 BRPM

## 2024-05-28 DIAGNOSIS — C61 PROSTATE CANCER (HCC): Primary | ICD-10-CM

## 2024-05-28 PROCEDURE — G8427 DOCREV CUR MEDS BY ELIG CLIN: HCPCS | Performed by: NURSE PRACTITIONER

## 2024-05-28 PROCEDURE — G8417 CALC BMI ABV UP PARAM F/U: HCPCS | Performed by: NURSE PRACTITIONER

## 2024-05-28 PROCEDURE — 1036F TOBACCO NON-USER: CPT | Performed by: NURSE PRACTITIONER

## 2024-05-28 PROCEDURE — 1123F ACP DISCUSS/DSCN MKR DOCD: CPT | Performed by: NURSE PRACTITIONER

## 2024-05-28 PROCEDURE — 99214 OFFICE O/P EST MOD 30 MIN: CPT | Performed by: NURSE PRACTITIONER

## 2024-05-28 RX ORDER — MEMANTINE HYDROCHLORIDE 5 MG/1
5 TABLET ORAL 2 TIMES DAILY
COMMUNITY
Start: 2024-04-19

## 2024-05-28 ASSESSMENT — ENCOUNTER SYMPTOMS
NAUSEA: 0
ABDOMINAL PAIN: 0
VOMITING: 0
BACK PAIN: 0

## 2024-05-28 NOTE — PROGRESS NOTES
[facility] [place] [facility]  [facility] UROLOGY  [address]  [address]  [facility] [address]  Dept: [phone]  Loc: [phone]    Visit Date: 5/28/2024        HPI:     Rory Grullon is a 84 y.o. male who presents today for:  Chief Complaint   Patient presents with    Other     Discuss concerns with blood work         HPI    Pt seen in follow up for prostate cancer     Mr. Grullon has a hx of prostate cancer s/p Robotic-Assisted Laparoscopic Radical Prostatectomy (RALRP) and bilateral pelvic lymph node dissection and bilateral nerve wrap 9/4/19 by Dr. Carrasco.  Final pathology resulted [place] 4+3=7 invasive moderately differentiated adenocarcinoma with extraprostatic extension, seminal vesicles and lymph nodes negative for neoplasm.  Grade group 3.  PT3a, pN0.  Preoperative decipher testing high risk.  PSA 2/23/23 <0.02.     Pt also has a hx of appendiceal ca s/p resection.  Follows with medical oncology.  Reports no evidence of recurrence thus far.       On trospium 20 mg night for nocturia with improvement.      Here today for concerns regarding his kidney function.  Undergoing routine blood work by rheumatology as on methotrexate. GFR and crt overall stable trend over time.      Current Outpatient Medications   Medication Sig Dispense Refill    memantine (NAMENDA) 5 MG tablet Take 1 tablet by mouth 2 times daily      methotrexate (RHEUMATREX) 2.5 MG chemo tablet Take 4 tablets by mouth once a week 16 tablet 0    trospium (SANCTURA) 20 MG tablet Take 1 tablet by mouth at bedtime 90 tablet 3    folic acid (FOLVITE) 1 MG tablet Take 1 tablet by mouth daily 90 tablet 1    diclofenac sodium (VOLTAREN) 1 % GEL Apply topically 4 times daily as needed for Pain      rivastigmine (EXELON) 4.6 MG/24HR Place 1 patch onto the skin daily      calcium carbonate (TUMS) 500 MG chewable tablet Take 1 tablet by mouth daily      aluminum & magnesium hydroxide-simethicone (MAALOX) 200-200-20

## 2024-06-13 ENCOUNTER — HOSPITAL ENCOUNTER (OUTPATIENT)
Age: 84
Discharge: HOME | End: 2024-06-13
Payer: MEDICARE

## 2024-06-13 DIAGNOSIS — Z51.81 MEDICATION MONITORING ENCOUNTER: ICD-10-CM

## 2024-06-13 LAB
ALBUMIN SERPL BCG-MCNC: 4 G/DL (ref 3.5–5.1)
ALP SERPL-CCNC: 47 U/L (ref 38–126)
ALT SERPL W/O P-5'-P-CCNC: 12 U/L (ref 11–66)
ANION GAP SERPL CALC-SCNC: 11 MEQ/L (ref 8–16)
AST SERPL-CCNC: 18 U/L (ref 5–40)
BILIRUB SERPL-MCNC: 0.5 MG/DL (ref 0.3–1.2)
BUN SERPL-MCNC: 18 MG/DL (ref 7–22)
CALCIUM SERPL-MCNC: 9.3 MG/DL (ref 8.5–10.5)
CHLORIDE SERPL-SCNC: 99 MEQ/L (ref 98–111)
CO2 SERPL-SCNC: 26 MEQ/L (ref 23–33)
CREAT SERPL-MCNC: 1.1 MG/DL (ref 0.4–1.2)
CRP SERPL-MCNC: < 0.3 MG/DL (ref 0–1)
ERYTHROCYTE [SEDIMENTATION RATE] IN BLOOD BY WESTERGREN METHOD: 28 MM/HR (ref 0–10)
GFR SERPL CREATININE-BSD FRML MDRD: 66 ML/MIN/1.73M2
GLUCOSE SERPL-MCNC: 103 MG/DL (ref 70–108)
POTASSIUM SERPL-SCNC: 4 MEQ/L (ref 3.5–5.2)
PROT SERPL-MCNC: 7.4 G/DL (ref 6.1–8)
SODIUM SERPL-SCNC: 136 MEQ/L (ref 135–145)

## 2024-06-13 PROCEDURE — 36415 COLL VENOUS BLD VENIPUNCTURE: CPT

## 2024-06-13 PROCEDURE — 80053 COMPREHEN METABOLIC PANEL: CPT

## 2024-06-13 PROCEDURE — 85025 COMPLETE CBC W/AUTO DIFF WBC: CPT

## 2024-06-13 PROCEDURE — 86140 C-REACTIVE PROTEIN: CPT

## 2024-06-13 PROCEDURE — 85651 RBC SED RATE NONAUTOMATED: CPT

## 2024-06-14 ENCOUNTER — TELEPHONE (OUTPATIENT)
Dept: RHEUMATOLOGY | Age: 84
End: 2024-06-14

## 2024-06-14 LAB
BASOPHILS ABSOLUTE: 0 THOU/MM3 (ref 0–0.1)
BASOPHILS NFR BLD AUTO: 0.7 %
DEPRECATED RDW RBC AUTO: 53 FL (ref 35–45)
EOSINOPHIL NFR BLD AUTO: 4.8 %
EOSINOPHILS ABSOLUTE: 0.3 THOU/MM3 (ref 0–0.4)
ERYTHROCYTE [DISTWIDTH] IN BLOOD BY AUTOMATED COUNT: 14.8 % (ref 11.5–14.5)
HCT VFR BLD AUTO: 40.1 % (ref 42–52)
HGB BLD-MCNC: 13.1 GM/DL (ref 14–18)
IMM GRANULOCYTES # BLD AUTO: 0.01 THOU/MM3 (ref 0–0.07)
IMM GRANULOCYTES NFR BLD AUTO: 0.1 %
LYMPHOCYTES ABSOLUTE: 1.4 THOU/MM3 (ref 1–4.8)
LYMPHOCYTES NFR BLD AUTO: 20.8 %
MCH RBC QN AUTO: 32 PG (ref 26–33)
MCHC RBC AUTO-ENTMCNC: 32.7 GM/DL (ref 32.2–35.5)
MCV RBC AUTO: 98 FL (ref 80–94)
MONOCYTES ABSOLUTE: 0.9 THOU/MM3 (ref 0.4–1.3)
MONOCYTES NFR BLD AUTO: 12.7 %
NEUTROPHILS ABSOLUTE: 4.1 THOU/MM3 (ref 1.8–7.7)
NEUTROPHILS NFR BLD AUTO: 60.9 %
NRBC BLD AUTO-RTO: 0 /100 WBC
PLATELET # BLD AUTO: 219 THOU/MM3 (ref 130–400)
PMV BLD AUTO: 10.9 FL (ref 9.4–12.4)
RBC # BLD AUTO: 4.09 MILL/MM3 (ref 4.7–6.1)
WBC # BLD AUTO: 6.8 THOU/MM3 (ref 4.8–10.8)

## 2024-06-14 NOTE — TELEPHONE ENCOUNTER
----- Message from Sheyla Peterson, SERVANDO - CNP sent at 6/13/2024  4:28 PM EDT -----  One of the inflammatory markers is mildly elevated. Liver and kidneys are stable. Awaiting the results of the blood counts.   
Attempted to contact the pt, no answer. Left detailed message on voicemail.   
Per policy, pantoprazole 40 mg IVP daily transitioned to oral formulation since the patient is tolerating feeding and/or other medications enterally. 
Recommended switching agent for venous thromboembolism prophylaxis from heparin SQ to enoxaparin SQ 40 mg daily due to ease of administration (reduced frequency) and lower incidence of thrombocytopenia including HIT. 
Recommended discontinuation of pantoprazole 40 mg IVP daily since the patient no longer meets clinical criteria for stress ulcer prophylaxis.

## 2024-06-17 DIAGNOSIS — M19.90 INFLAMMATORY ARTHRITIS: ICD-10-CM

## 2024-06-17 RX ORDER — METHOTREXATE 2.5 MG/1
10 TABLET ORAL WEEKLY
Qty: 16 TABLET | Refills: 0 | Status: SHIPPED | OUTPATIENT
Start: 2024-06-17 | End: 2024-07-17

## 2024-06-17 NOTE — PROGRESS NOTES
Diagnosis Orders   1. Inflammatory arthritis  methotrexate (RHEUMATREX) 2.5 MG chemo tablet

## 2024-07-11 ENCOUNTER — HOSPITAL ENCOUNTER (OUTPATIENT)
Age: 84
Discharge: HOME | End: 2024-07-11
Payer: MEDICARE

## 2024-07-11 DIAGNOSIS — M19.90 INFLAMMATORY ARTHRITIS: ICD-10-CM

## 2024-07-11 LAB
ALBUMIN SERPL BCG-MCNC: 4.1 G/DL (ref 3.5–5.1)
ALP SERPL-CCNC: 45 U/L (ref 38–126)
ALT SERPL W/O P-5'-P-CCNC: 12 U/L (ref 11–66)
ANION GAP SERPL CALC-SCNC: 11 MEQ/L (ref 8–16)
AST SERPL-CCNC: 16 U/L (ref 5–40)
BASOPHILS ABSOLUTE: 0.1 THOU/MM3 (ref 0–0.1)
BASOPHILS NFR BLD AUTO: 1 %
BILIRUB SERPL-MCNC: 0.4 MG/DL (ref 0.3–1.2)
BUN SERPL-MCNC: 15 MG/DL (ref 7–22)
CALCIUM SERPL-MCNC: 8.9 MG/DL (ref 8.5–10.5)
CHLORIDE SERPL-SCNC: 101 MEQ/L (ref 98–111)
CO2 SERPL-SCNC: 23 MEQ/L (ref 23–33)
CREAT SERPL-MCNC: 1.2 MG/DL (ref 0.4–1.2)
CRP SERPL-MCNC: < 0.3 MG/DL (ref 0–1)
DEPRECATED RDW RBC AUTO: 50 FL (ref 35–45)
EOSINOPHIL NFR BLD AUTO: 5.2 %
EOSINOPHILS ABSOLUTE: 0.3 THOU/MM3 (ref 0–0.4)
ERYTHROCYTE [DISTWIDTH] IN BLOOD BY AUTOMATED COUNT: 14.3 % (ref 11.5–14.5)
ERYTHROCYTE [SEDIMENTATION RATE] IN BLOOD BY WESTERGREN METHOD: 30 MM/HR (ref 0–10)
GFR SERPL CREATININE-BSD FRML MDRD: 59 ML/MIN/1.73M2
GLUCOSE SERPL-MCNC: 112 MG/DL (ref 70–108)
HCT VFR BLD AUTO: 39.3 % (ref 42–52)
HGB BLD-MCNC: 13.1 GM/DL (ref 14–18)
IMM GRANULOCYTES # BLD AUTO: 0.01 THOU/MM3 (ref 0–0.07)
IMM GRANULOCYTES NFR BLD AUTO: 0.2 %
LYMPHOCYTES ABSOLUTE: 1.3 THOU/MM3 (ref 1–4.8)
LYMPHOCYTES NFR BLD AUTO: 21.2 %
MCH RBC QN AUTO: 32.2 PG (ref 26–33)
MCHC RBC AUTO-ENTMCNC: 33.3 GM/DL (ref 32.2–35.5)
MCV RBC AUTO: 96.6 FL (ref 80–94)
MONOCYTES ABSOLUTE: 0.8 THOU/MM3 (ref 0.4–1.3)
MONOCYTES NFR BLD AUTO: 13.7 %
NEUTROPHILS ABSOLUTE: 3.5 THOU/MM3 (ref 1.8–7.7)
NEUTROPHILS NFR BLD AUTO: 58.7 %
NRBC BLD AUTO-RTO: 0 /100 WBC
PLATELET # BLD AUTO: 217 THOU/MM3 (ref 130–400)
PMV BLD AUTO: 10.6 FL (ref 9.4–12.4)
POTASSIUM SERPL-SCNC: 4.1 MEQ/L (ref 3.5–5.2)
PROT SERPL-MCNC: 7.2 G/DL (ref 6.1–8)
RBC # BLD AUTO: 4.07 MILL/MM3 (ref 4.7–6.1)
SODIUM SERPL-SCNC: 135 MEQ/L (ref 135–145)
WBC # BLD AUTO: 6 THOU/MM3 (ref 4.8–10.8)

## 2024-07-11 PROCEDURE — 85651 RBC SED RATE NONAUTOMATED: CPT

## 2024-07-11 PROCEDURE — 36415 COLL VENOUS BLD VENIPUNCTURE: CPT

## 2024-07-11 PROCEDURE — 86140 C-REACTIVE PROTEIN: CPT

## 2024-07-11 PROCEDURE — 85025 COMPLETE CBC W/AUTO DIFF WBC: CPT

## 2024-07-11 PROCEDURE — 80053 COMPREHEN METABOLIC PANEL: CPT

## 2024-07-11 RX ORDER — METHOTREXATE 2.5 MG/1
10 TABLET ORAL WEEKLY
Qty: 16 TABLET | Refills: 0 | Status: SHIPPED | OUTPATIENT
Start: 2024-07-11 | End: 2024-08-10

## 2024-07-31 ENCOUNTER — OFFICE VISIT (OUTPATIENT)
Dept: RHEUMATOLOGY | Age: 84
End: 2024-07-31

## 2024-07-31 VITALS
HEART RATE: 75 BPM | DIASTOLIC BLOOD PRESSURE: 60 MMHG | SYSTOLIC BLOOD PRESSURE: 124 MMHG | WEIGHT: 220.8 LBS | HEIGHT: 70 IN | BODY MASS INDEX: 31.61 KG/M2 | OXYGEN SATURATION: 96 %

## 2024-07-31 DIAGNOSIS — M19.90 INFLAMMATORY ARTHRITIS: ICD-10-CM

## 2024-07-31 DIAGNOSIS — M05.9 SEROPOSITIVE RHEUMATOID ARTHRITIS (HCC): Primary | ICD-10-CM

## 2024-07-31 DIAGNOSIS — M19.011 OSTEOARTHRITIS OF BOTH SHOULDERS, UNSPECIFIED OSTEOARTHRITIS TYPE: ICD-10-CM

## 2024-07-31 DIAGNOSIS — M19.012 OSTEOARTHRITIS OF BOTH SHOULDERS, UNSPECIFIED OSTEOARTHRITIS TYPE: ICD-10-CM

## 2024-07-31 DIAGNOSIS — M19.241 OTHER SECONDARY OSTEOARTHRITIS OF BOTH HANDS: ICD-10-CM

## 2024-07-31 DIAGNOSIS — Z51.81 MEDICATION MONITORING ENCOUNTER: ICD-10-CM

## 2024-07-31 DIAGNOSIS — M47.816 OSTEOARTHRITIS OF LUMBAR SPINE, UNSPECIFIED SPINAL OSTEOARTHRITIS COMPLICATION STATUS: ICD-10-CM

## 2024-07-31 DIAGNOSIS — R09.89 RESPIRATORY CRACKLES: ICD-10-CM

## 2024-07-31 DIAGNOSIS — M19.242 OTHER SECONDARY OSTEOARTHRITIS OF BOTH HANDS: ICD-10-CM

## 2024-07-31 RX ORDER — METHOTREXATE 2.5 MG/1
15 TABLET ORAL WEEKLY
Qty: 24 TABLET | Refills: 0 | Status: SHIPPED | OUTPATIENT
Start: 2024-07-31 | End: 2024-08-30

## 2024-07-31 ASSESSMENT — ENCOUNTER SYMPTOMS
GASTROINTESTINAL NEGATIVE: 1
EYES NEGATIVE: 1
SHORTNESS OF BREATH: 1
COUGH: 1
WHEEZING: 1

## 2024-07-31 ASSESSMENT — JOINT PAIN
TOTAL NUMBER OF SWOLLEN JOINTS: 1
TOTAL NUMBER OF TENDER JOINTS: 17

## 2024-07-31 NOTE — PROGRESS NOTES
Cardiovascular:      Rate and Rhythm: Normal rate.      Heart sounds: No murmur heard.  Pulmonary:      Effort: Pulmonary effort is normal.      Breath sounds: Normal breath sounds.   Musculoskeletal:         General: Swelling and tenderness present.   Skin:     General: Skin is warm and dry.   Neurological:      Mental Status: He is alert.         Musculoskeletal:    SHOULDERS - limited full flexion/abduction.   ELBOWS tender   WRISTS tender   HANDS  tender , + dip nodules.  Cmc nodules, inability to make a composite fist  HIPS      Non-tender   KNEES   tender joint space and pes anserine region  ANKLES    FEET :    tender left mtps    SPINE:   Tender lumbosacral spine at the lumbosacral junction and sacral foci      RAPID3 Score = MDHAQ (0-10) + Patient pain VAS (0-10): + Patient global assessment VAS (0-10):     7/31/24  --- RAPID 3: 3+7+4  = 14     Scoring: Remission: <3 ,  Low Disease Activity: <6, Moderate Disease Activity: >=6 and <=12, High Disease Activity: >12     LABS      Lab Results   Component Value Date    WBC 6.0 07/11/2024    HGB 13.1 (L) 07/11/2024    HGB 13.1 (L) 06/13/2024    HGB 12.8 (L) 05/15/2024    MCV 96.6 (H) 07/11/2024    MCHC 33.3 07/11/2024    RDW 14.7 03/20/2024     07/11/2024     06/13/2024     05/15/2024    NEUTROABS 3.5 07/11/2024    LYMPHSABS 1.3 07/11/2024    LYMPHSABS 1.4 06/13/2024    LYMPHSABS 1.5 05/15/2024    EOSABS 0.3 07/11/2024    BASOSABS 0.1 07/11/2024         Chemistry        Component Value Date/Time     07/11/2024 0821    K 4.1 07/11/2024 0821    K 3.3 (L) 06/22/2018 0507     07/11/2024 0821    CO2 23 07/11/2024 0821    BUN 15 07/11/2024 0821    CREATININE 1.2 07/11/2024 0821        Component Value Date/Time    CALCIUM 8.9 07/11/2024 0821    ALKPHOS 45 07/11/2024 0821    AST 16 07/11/2024 0821    ALT 12 07/11/2024 0821    BILITOT 0.4 07/11/2024 0821            Lab Results   Component Value Date    SEDRATE 30 (H) 07/11/2024    SEDRATE

## 2024-08-08 ENCOUNTER — HOSPITAL ENCOUNTER (OUTPATIENT)
Dept: CT IMAGING | Age: 84
Discharge: HOME | End: 2024-08-08
Attending: INTERNAL MEDICINE
Payer: MEDICARE

## 2024-08-08 ENCOUNTER — HOSPITAL ENCOUNTER (OUTPATIENT)
Age: 84
Discharge: HOME | End: 2024-08-08
Attending: INTERNAL MEDICINE
Payer: MEDICARE

## 2024-08-08 DIAGNOSIS — R09.89 RESPIRATORY CRACKLES: ICD-10-CM

## 2024-08-08 DIAGNOSIS — M05.9 SEROPOSITIVE RHEUMATOID ARTHRITIS (HCC): ICD-10-CM

## 2024-08-08 LAB
ALBUMIN SERPL BCG-MCNC: 4.1 G/DL (ref 3.5–5.1)
ALP SERPL-CCNC: 47 U/L (ref 38–126)
ALT SERPL W/O P-5'-P-CCNC: 14 U/L (ref 11–66)
ANION GAP SERPL CALC-SCNC: 12 MEQ/L (ref 8–16)
AST SERPL-CCNC: 17 U/L (ref 5–40)
BASOPHILS ABSOLUTE: 0 THOU/MM3 (ref 0–0.1)
BASOPHILS NFR BLD AUTO: 0.8 %
BILIRUB SERPL-MCNC: 0.3 MG/DL (ref 0.3–1.2)
BUN SERPL-MCNC: 15 MG/DL (ref 7–22)
CALCIUM SERPL-MCNC: 9.1 MG/DL (ref 8.5–10.5)
CHLORIDE SERPL-SCNC: 100 MEQ/L (ref 98–111)
CO2 SERPL-SCNC: 24 MEQ/L (ref 23–33)
CREAT SERPL-MCNC: 1.1 MG/DL (ref 0.4–1.2)
CRP SERPL-MCNC: < 0.3 MG/DL (ref 0–1)
DEPRECATED RDW RBC AUTO: 52.2 FL (ref 35–45)
EOSINOPHIL NFR BLD AUTO: 4.8 %
EOSINOPHILS ABSOLUTE: 0.3 THOU/MM3 (ref 0–0.4)
ERYTHROCYTE [DISTWIDTH] IN BLOOD BY AUTOMATED COUNT: 14.7 % (ref 11.5–14.5)
ERYTHROCYTE [SEDIMENTATION RATE] IN BLOOD BY WESTERGREN METHOD: 29 MM/HR (ref 0–10)
GFR SERPL CREATININE-BSD FRML MDRD: 66 ML/MIN/1.73M2
GLUCOSE SERPL-MCNC: 114 MG/DL (ref 70–108)
HCT VFR BLD AUTO: 40.1 % (ref 42–52)
HGB BLD-MCNC: 13.1 GM/DL (ref 14–18)
IMM GRANULOCYTES # BLD AUTO: 0.01 THOU/MM3 (ref 0–0.07)
IMM GRANULOCYTES NFR BLD AUTO: 0.2 %
LYMPHOCYTES ABSOLUTE: 1.4 THOU/MM3 (ref 1–4.8)
LYMPHOCYTES NFR BLD AUTO: 22.8 %
MCH RBC QN AUTO: 32 PG (ref 26–33)
MCHC RBC AUTO-ENTMCNC: 32.7 GM/DL (ref 32.2–35.5)
MCV RBC AUTO: 97.8 FL (ref 80–94)
MONOCYTES ABSOLUTE: 0.9 THOU/MM3 (ref 0.4–1.3)
MONOCYTES NFR BLD AUTO: 14.4 %
NEUTROPHILS ABSOLUTE: 3.5 THOU/MM3 (ref 1.8–7.7)
NEUTROPHILS NFR BLD AUTO: 57 %
NRBC BLD AUTO-RTO: 0 /100 WBC
PLATELET # BLD AUTO: 204 THOU/MM3 (ref 130–400)
PMV BLD AUTO: 10.9 FL (ref 9.4–12.4)
POTASSIUM SERPL-SCNC: 4.2 MEQ/L (ref 3.5–5.2)
PROT SERPL-MCNC: 7.3 G/DL (ref 6.1–8)
RBC # BLD AUTO: 4.1 MILL/MM3 (ref 4.7–6.1)
SODIUM SERPL-SCNC: 136 MEQ/L (ref 135–145)
WBC # BLD AUTO: 6.1 THOU/MM3 (ref 4.8–10.8)

## 2024-08-08 PROCEDURE — 86140 C-REACTIVE PROTEIN: CPT

## 2024-08-08 PROCEDURE — 71250 CT THORAX DX C-: CPT

## 2024-08-08 PROCEDURE — 36415 COLL VENOUS BLD VENIPUNCTURE: CPT

## 2024-08-08 PROCEDURE — 80053 COMPREHEN METABOLIC PANEL: CPT

## 2024-08-08 PROCEDURE — 85025 COMPLETE CBC W/AUTO DIFF WBC: CPT

## 2024-08-08 PROCEDURE — 85651 RBC SED RATE NONAUTOMATED: CPT

## 2024-08-09 ENCOUNTER — TELEPHONE (OUTPATIENT)
Dept: RHEUMATOLOGY | Age: 84
End: 2024-08-09

## 2024-08-09 NOTE — TELEPHONE ENCOUNTER
----- Message from Ace Jackson DO sent at 8/8/2024  5:30 PM EDT -----  The blood tests are stable compared to your prior evaluations.  Please have your labs repeated in 4 weeks given the recent increased dose of the methotrexate

## 2024-08-13 ENCOUNTER — TELEPHONE (OUTPATIENT)
Dept: RHEUMATOLOGY | Age: 84
End: 2024-08-13

## 2024-08-13 NOTE — TELEPHONE ENCOUNTER
----- Message from Dr. Ace Jackson DO sent at 8/9/2024 12:48 PM EDT -----  The CT of the lungs was notable for mild scarring in the upper lungs.  There is mild to moderate scarring in the lung bases.  There are some calcified lymph nodes within the mid chest that appear stable.    No overt inflammatory process appreciated.

## 2024-09-05 ENCOUNTER — HOSPITAL ENCOUNTER (OUTPATIENT)
Age: 84
Discharge: HOME | End: 2024-09-05
Payer: MEDICARE

## 2024-09-05 LAB
ALBUMIN SERPL BCG-MCNC: 4.1 G/DL (ref 3.5–5.1)
ALP SERPL-CCNC: 52 U/L (ref 38–126)
ALT SERPL W/O P-5'-P-CCNC: 12 U/L (ref 11–66)
ANION GAP SERPL CALC-SCNC: 11 MEQ/L (ref 8–16)
AST SERPL-CCNC: 18 U/L (ref 5–40)
BASOPHILS ABSOLUTE: 0 THOU/MM3 (ref 0–0.1)
BASOPHILS NFR BLD AUTO: 0.7 %
BILIRUB SERPL-MCNC: 0.5 MG/DL (ref 0.3–1.2)
BUN SERPL-MCNC: 16 MG/DL (ref 7–22)
CALCIUM SERPL-MCNC: 9.1 MG/DL (ref 8.5–10.5)
CHLORIDE SERPL-SCNC: 100 MEQ/L (ref 98–111)
CO2 SERPL-SCNC: 25 MEQ/L (ref 23–33)
CREAT SERPL-MCNC: 1.2 MG/DL (ref 0.4–1.2)
CRP SERPL-MCNC: < 0.3 MG/DL (ref 0–1)
DEPRECATED RDW RBC AUTO: 52.8 FL (ref 35–45)
EOSINOPHIL NFR BLD AUTO: 4.5 %
EOSINOPHILS ABSOLUTE: 0.3 THOU/MM3 (ref 0–0.4)
ERYTHROCYTE [DISTWIDTH] IN BLOOD BY AUTOMATED COUNT: 15 % (ref 11.5–14.5)
ERYTHROCYTE [SEDIMENTATION RATE] IN BLOOD BY WESTERGREN METHOD: 30 MM/HR (ref 0–10)
GFR SERPL CREATININE-BSD FRML MDRD: 59 ML/MIN/1.73M2
GLUCOSE SERPL-MCNC: 112 MG/DL (ref 70–108)
HCT VFR BLD AUTO: 39.5 % (ref 42–52)
HGB BLD-MCNC: 13 GM/DL (ref 14–18)
IMM GRANULOCYTES # BLD AUTO: 0.02 THOU/MM3 (ref 0–0.07)
IMM GRANULOCYTES NFR BLD AUTO: 0.3 %
LYMPHOCYTES ABSOLUTE: 1.4 THOU/MM3 (ref 1–4.8)
LYMPHOCYTES NFR BLD AUTO: 19.5 %
MCH RBC QN AUTO: 31.8 PG (ref 26–33)
MCHC RBC AUTO-ENTMCNC: 32.9 GM/DL (ref 32.2–35.5)
MCV RBC AUTO: 96.6 FL (ref 80–94)
MONOCYTES ABSOLUTE: 1 THOU/MM3 (ref 0.4–1.3)
MONOCYTES NFR BLD AUTO: 14.1 %
NEUTROPHILS ABSOLUTE: 4.3 THOU/MM3 (ref 1.8–7.7)
NEUTROPHILS NFR BLD AUTO: 60.9 %
NRBC BLD AUTO-RTO: 0 /100 WBC
PLATELET # BLD AUTO: 213 THOU/MM3 (ref 130–400)
PMV BLD AUTO: 10.5 FL (ref 9.4–12.4)
POTASSIUM SERPL-SCNC: 3.8 MEQ/L (ref 3.5–5.2)
PROT SERPL-MCNC: 7.6 G/DL (ref 6.1–8)
PSA SERPL-MCNC: < 0.02 NG/ML (ref 0–1)
RBC # BLD AUTO: 4.09 MILL/MM3 (ref 4.7–6.1)
SODIUM SERPL-SCNC: 136 MEQ/L (ref 135–145)
WBC # BLD AUTO: 7.1 THOU/MM3 (ref 4.8–10.8)

## 2024-09-05 PROCEDURE — 85651 RBC SED RATE NONAUTOMATED: CPT

## 2024-09-05 PROCEDURE — G0103 PSA SCREENING: HCPCS

## 2024-09-05 PROCEDURE — 36415 COLL VENOUS BLD VENIPUNCTURE: CPT

## 2024-09-05 PROCEDURE — 86140 C-REACTIVE PROTEIN: CPT

## 2024-09-05 PROCEDURE — 85025 COMPLETE CBC W/AUTO DIFF WBC: CPT

## 2024-09-05 PROCEDURE — 80053 COMPREHEN METABOLIC PANEL: CPT

## 2024-09-18 ENCOUNTER — OFFICE VISIT (OUTPATIENT)
Dept: RHEUMATOLOGY | Age: 84
End: 2024-09-18
Payer: MEDICARE

## 2024-09-18 VITALS
HEART RATE: 98 BPM | OXYGEN SATURATION: 97 % | DIASTOLIC BLOOD PRESSURE: 64 MMHG | SYSTOLIC BLOOD PRESSURE: 122 MMHG | HEIGHT: 70 IN | WEIGHT: 225 LBS | BODY MASS INDEX: 32.21 KG/M2

## 2024-09-18 DIAGNOSIS — Z51.81 MEDICATION MONITORING ENCOUNTER: ICD-10-CM

## 2024-09-18 DIAGNOSIS — R09.89 RESPIRATORY CRACKLES: ICD-10-CM

## 2024-09-18 DIAGNOSIS — M47.816 OSTEOARTHRITIS OF LUMBAR SPINE, UNSPECIFIED SPINAL OSTEOARTHRITIS COMPLICATION STATUS: ICD-10-CM

## 2024-09-18 DIAGNOSIS — M19.242 OTHER SECONDARY OSTEOARTHRITIS OF BOTH HANDS: ICD-10-CM

## 2024-09-18 DIAGNOSIS — M19.241 OTHER SECONDARY OSTEOARTHRITIS OF BOTH HANDS: ICD-10-CM

## 2024-09-18 DIAGNOSIS — M19.011 OSTEOARTHRITIS OF BOTH SHOULDERS, UNSPECIFIED OSTEOARTHRITIS TYPE: ICD-10-CM

## 2024-09-18 DIAGNOSIS — M05.9 SEROPOSITIVE RHEUMATOID ARTHRITIS (HCC): Primary | ICD-10-CM

## 2024-09-18 DIAGNOSIS — M19.012 OSTEOARTHRITIS OF BOTH SHOULDERS, UNSPECIFIED OSTEOARTHRITIS TYPE: ICD-10-CM

## 2024-09-18 PROCEDURE — 1036F TOBACCO NON-USER: CPT | Performed by: NURSE PRACTITIONER

## 2024-09-18 PROCEDURE — G8417 CALC BMI ABV UP PARAM F/U: HCPCS | Performed by: NURSE PRACTITIONER

## 2024-09-18 PROCEDURE — 99214 OFFICE O/P EST MOD 30 MIN: CPT | Performed by: NURSE PRACTITIONER

## 2024-09-18 PROCEDURE — G8427 DOCREV CUR MEDS BY ELIG CLIN: HCPCS | Performed by: NURSE PRACTITIONER

## 2024-09-18 PROCEDURE — G2211 COMPLEX E/M VISIT ADD ON: HCPCS | Performed by: NURSE PRACTITIONER

## 2024-09-18 PROCEDURE — 1123F ACP DISCUSS/DSCN MKR DOCD: CPT | Performed by: NURSE PRACTITIONER

## 2024-09-18 RX ORDER — METHOTREXATE 2.5 MG/1
15 TABLET ORAL WEEKLY
Qty: 24 TABLET | Refills: 0 | Status: SHIPPED | OUTPATIENT
Start: 2024-09-18 | End: 2024-10-18

## 2024-09-18 ASSESSMENT — ENCOUNTER SYMPTOMS
SHORTNESS OF BREATH: 1
COUGH: 0
TROUBLE SWALLOWING: 0
EYE ITCHING: 0
NAUSEA: 0
ABDOMINAL PAIN: 0
CONSTIPATION: 0
DIARRHEA: 0
EYE PAIN: 0

## 2024-10-10 ENCOUNTER — HOSPITAL ENCOUNTER (OUTPATIENT)
Age: 84
Discharge: HOME | End: 2024-10-10
Payer: MEDICARE

## 2024-10-10 DIAGNOSIS — M05.9 SEROPOSITIVE RHEUMATOID ARTHRITIS (HCC): Primary | ICD-10-CM

## 2024-10-10 LAB
ALBUMIN SERPL BCG-MCNC: 4.1 G/DL (ref 3.5–5.1)
ALP SERPL-CCNC: 53 U/L (ref 38–126)
ALT SERPL W/O P-5'-P-CCNC: 19 U/L (ref 11–66)
ANION GAP SERPL CALC-SCNC: 12 MEQ/L (ref 8–16)
AST SERPL-CCNC: 21 U/L (ref 5–40)
BASOPHILS ABSOLUTE: 0 THOU/MM3 (ref 0–0.1)
BASOPHILS NFR BLD AUTO: 0.7 %
BILIRUB SERPL-MCNC: 0.4 MG/DL (ref 0.3–1.2)
BUN SERPL-MCNC: 15 MG/DL (ref 7–22)
CALCIUM SERPL-MCNC: 9.1 MG/DL (ref 8.5–10.5)
CHLORIDE SERPL-SCNC: 97 MEQ/L (ref 98–111)
CO2 SERPL-SCNC: 27 MEQ/L (ref 23–33)
CREAT SERPL-MCNC: 1.2 MG/DL (ref 0.4–1.2)
CRP SERPL-MCNC: < 0.3 MG/DL (ref 0–1)
DEPRECATED RDW RBC AUTO: 54.5 FL (ref 35–45)
EOSINOPHIL NFR BLD AUTO: 4.1 %
EOSINOPHILS ABSOLUTE: 0.3 THOU/MM3 (ref 0–0.4)
ERYTHROCYTE [DISTWIDTH] IN BLOOD BY AUTOMATED COUNT: 15.1 % (ref 11.5–14.5)
ERYTHROCYTE [SEDIMENTATION RATE] IN BLOOD BY WESTERGREN METHOD: 30 MM/HR (ref 0–10)
GFR SERPL CREATININE-BSD FRML MDRD: 59 ML/MIN/1.73M2
GLUCOSE SERPL-MCNC: 120 MG/DL (ref 70–108)
HCT VFR BLD AUTO: 41.8 % (ref 42–52)
HGB BLD-MCNC: 13.6 GM/DL (ref 14–18)
IMM GRANULOCYTES # BLD AUTO: 0.02 THOU/MM3 (ref 0–0.07)
IMM GRANULOCYTES NFR BLD AUTO: 0.3 %
LYMPHOCYTES ABSOLUTE: 1.3 THOU/MM3 (ref 1–4.8)
LYMPHOCYTES NFR BLD AUTO: 21.2 %
MCH RBC QN AUTO: 32 PG (ref 26–33)
MCHC RBC AUTO-ENTMCNC: 32.5 GM/DL (ref 32.2–35.5)
MCV RBC AUTO: 98.4 FL (ref 80–94)
MONOCYTES ABSOLUTE: 0.9 THOU/MM3 (ref 0.4–1.3)
MONOCYTES NFR BLD AUTO: 15.1 %
NEUTROPHILS ABSOLUTE: 3.6 THOU/MM3 (ref 1.8–7.7)
NEUTROPHILS NFR BLD AUTO: 58.6 %
NRBC BLD AUTO-RTO: 0 /100 WBC
PLATELET # BLD AUTO: 210 THOU/MM3 (ref 130–400)
PMV BLD AUTO: 10.7 FL (ref 9.4–12.4)
POTASSIUM SERPL-SCNC: 4.3 MEQ/L (ref 3.5–5.2)
PROT SERPL-MCNC: 7.5 G/DL (ref 6.1–8)
RBC # BLD AUTO: 4.25 MILL/MM3 (ref 4.7–6.1)
SODIUM SERPL-SCNC: 136 MEQ/L (ref 135–145)
WBC # BLD AUTO: 6.1 THOU/MM3 (ref 4.8–10.8)

## 2024-10-10 PROCEDURE — 36415 COLL VENOUS BLD VENIPUNCTURE: CPT

## 2024-10-10 PROCEDURE — 80053 COMPREHEN METABOLIC PANEL: CPT

## 2024-10-10 PROCEDURE — 85025 COMPLETE CBC W/AUTO DIFF WBC: CPT

## 2024-10-10 PROCEDURE — 86140 C-REACTIVE PROTEIN: CPT

## 2024-10-10 PROCEDURE — 85651 RBC SED RATE NONAUTOMATED: CPT

## 2024-10-10 RX ORDER — METHOTREXATE 2.5 MG/1
15 TABLET ORAL WEEKLY
Qty: 24 TABLET | Refills: 0 | Status: SHIPPED | OUTPATIENT
Start: 2024-10-10 | End: 2024-11-09

## 2024-11-06 ENCOUNTER — HOSPITAL ENCOUNTER (OUTPATIENT)
Age: 84
Discharge: HOME | End: 2024-11-06
Payer: MEDICARE

## 2024-11-06 LAB
ALBUMIN SERPL BCG-MCNC: 3.9 G/DL (ref 3.5–5.1)
ALP SERPL-CCNC: 49 U/L (ref 38–126)
ALT SERPL W/O P-5'-P-CCNC: 15 U/L (ref 11–66)
ANION GAP SERPL CALC-SCNC: 11 MEQ/L (ref 8–16)
AST SERPL-CCNC: 15 U/L (ref 5–40)
BASOPHILS ABSOLUTE: 0.1 THOU/MM3 (ref 0–0.1)
BASOPHILS NFR BLD AUTO: 1.1 %
BILIRUB SERPL-MCNC: 0.3 MG/DL (ref 0.3–1.2)
BUN SERPL-MCNC: 17 MG/DL (ref 7–22)
CALCIUM SERPL-MCNC: 9.1 MG/DL (ref 8.5–10.5)
CHLORIDE SERPL-SCNC: 101 MEQ/L (ref 98–111)
CO2 SERPL-SCNC: 27 MEQ/L (ref 23–33)
CREAT SERPL-MCNC: 1.3 MG/DL (ref 0.4–1.2)
CRP SERPL-MCNC: < 0.3 MG/DL (ref 0–1)
DEPRECATED RDW RBC AUTO: 55.3 FL (ref 35–45)
EOSINOPHIL NFR BLD AUTO: 4.2 %
EOSINOPHILS ABSOLUTE: 0.3 THOU/MM3 (ref 0–0.4)
ERYTHROCYTE [DISTWIDTH] IN BLOOD BY AUTOMATED COUNT: 15.2 % (ref 11.5–14.5)
ERYTHROCYTE [SEDIMENTATION RATE] IN BLOOD BY WESTERGREN METHOD: 25 MM/HR (ref 0–10)
GFR SERPL CREATININE-BSD FRML MDRD: 54 ML/MIN/1.73M2
GLUCOSE SERPL-MCNC: 115 MG/DL (ref 70–108)
HCT VFR BLD AUTO: 41 % (ref 42–52)
HGB BLD-MCNC: 13.1 GM/DL (ref 14–18)
IMM GRANULOCYTES # BLD AUTO: 0.02 THOU/MM3 (ref 0–0.07)
IMM GRANULOCYTES NFR BLD AUTO: 0.3 %
LYMPHOCYTES ABSOLUTE: 1.6 THOU/MM3 (ref 1–4.8)
LYMPHOCYTES NFR BLD AUTO: 23.9 %
MCH RBC QN AUTO: 31.9 PG (ref 26–33)
MCHC RBC AUTO-ENTMCNC: 32 GM/DL (ref 32.2–35.5)
MCV RBC AUTO: 99.8 FL (ref 80–94)
MONOCYTES ABSOLUTE: 1.1 THOU/MM3 (ref 0.4–1.3)
MONOCYTES NFR BLD AUTO: 16.4 %
NEUTROPHILS ABSOLUTE: 3.6 THOU/MM3 (ref 1.8–7.7)
NEUTROPHILS NFR BLD AUTO: 54.1 %
NRBC BLD AUTO-RTO: 0 /100 WBC
PLATELET # BLD AUTO: 228 THOU/MM3 (ref 130–400)
PMV BLD AUTO: 10.8 FL (ref 9.4–12.4)
POTASSIUM SERPL-SCNC: 4.5 MEQ/L (ref 3.5–5.2)
PROT SERPL-MCNC: 7.3 G/DL (ref 6.1–8)
RBC # BLD AUTO: 4.11 MILL/MM3 (ref 4.7–6.1)
SODIUM SERPL-SCNC: 139 MEQ/L (ref 135–145)
WBC # BLD AUTO: 6.6 THOU/MM3 (ref 4.8–10.8)

## 2024-11-06 PROCEDURE — 36415 COLL VENOUS BLD VENIPUNCTURE: CPT

## 2024-11-06 PROCEDURE — 85651 RBC SED RATE NONAUTOMATED: CPT

## 2024-11-06 PROCEDURE — 85025 COMPLETE CBC W/AUTO DIFF WBC: CPT

## 2024-11-06 PROCEDURE — 86140 C-REACTIVE PROTEIN: CPT

## 2024-11-06 PROCEDURE — 80053 COMPREHEN METABOLIC PANEL: CPT

## 2024-11-07 DIAGNOSIS — M05.9 SEROPOSITIVE RHEUMATOID ARTHRITIS (HCC): Primary | ICD-10-CM

## 2024-11-07 RX ORDER — METHOTREXATE 2.5 MG/1
15 TABLET ORAL WEEKLY
Qty: 48 TABLET | Refills: 0 | Status: SHIPPED | OUTPATIENT
Start: 2024-11-07 | End: 2024-12-07

## 2024-11-25 ENCOUNTER — OFFICE VISIT (OUTPATIENT)
Dept: RHEUMATOLOGY | Age: 84
End: 2024-11-25

## 2024-11-25 VITALS
SYSTOLIC BLOOD PRESSURE: 122 MMHG | WEIGHT: 232 LBS | OXYGEN SATURATION: 96 % | HEART RATE: 83 BPM | BODY MASS INDEX: 33.21 KG/M2 | HEIGHT: 70 IN | DIASTOLIC BLOOD PRESSURE: 64 MMHG

## 2024-11-25 DIAGNOSIS — M19.242 OTHER SECONDARY OSTEOARTHRITIS OF BOTH HANDS: ICD-10-CM

## 2024-11-25 DIAGNOSIS — M47.816 OSTEOARTHRITIS OF LUMBAR SPINE, UNSPECIFIED SPINAL OSTEOARTHRITIS COMPLICATION STATUS: ICD-10-CM

## 2024-11-25 DIAGNOSIS — Z51.81 MEDICATION MONITORING ENCOUNTER: ICD-10-CM

## 2024-11-25 DIAGNOSIS — M05.9 SEROPOSITIVE RHEUMATOID ARTHRITIS (HCC): Primary | ICD-10-CM

## 2024-11-25 DIAGNOSIS — M19.241 OTHER SECONDARY OSTEOARTHRITIS OF BOTH HANDS: ICD-10-CM

## 2024-11-25 ASSESSMENT — ENCOUNTER SYMPTOMS
CONSTIPATION: 0
EYE PAIN: 0
DIARRHEA: 0
COUGH: 0
TROUBLE SWALLOWING: 0
BACK PAIN: 0
ABDOMINAL PAIN: 0
SHORTNESS OF BREATH: 1
EYE ITCHING: 0
NAUSEA: 0

## 2024-11-25 NOTE — PROGRESS NOTES
[facility] RHEUMATOLOGY FOLLOW UP NOTE     Date Of Service: 11/25/2024  Provider: SERVANDO Ceballos CNP ,   PCP: Fredis Wise APRN - CNP   Name: Rory Grullon   MRN: [id number]    Subjective   CHIEF COMPLAINT:    Chief Complaint   Patient presents with    Follow-up     2 month f/u RA  He has been doing pretty good.        Rory Grullon  is a(n)84 y.o. male  here for the f/u evaluation of rheumatoid arthritis     Interval hx:    - has been on the methotrexate with relief    - wife reports dementia is getting a little worse     Denies any joint pains, swelling or morning stiffness.     REVIEW OF SYSTEMS: (ROS)    Review of Systems   Constitutional:  Negative for fatigue, fever and unexpected weight change.   HENT:  Negative for congestion and trouble swallowing.    Eyes:  Negative for pain and itching.   Respiratory:  Positive for shortness of breath. Negative for cough.    Cardiovascular:  Negative for chest pain and leg swelling.   Gastrointestinal:  Negative for abdominal pain, constipation, diarrhea and nausea.   Endocrine: Negative for cold intolerance and heat intolerance.   Genitourinary:  Negative for difficulty urinating, frequency and urgency.   Musculoskeletal:  Negative for arthralgias, back pain and joint swelling.   Skin:  Negative for rash.   Neurological:  Negative for dizziness, weakness, numbness and headaches.   Psychiatric/Behavioral:  The patient is not nervous/anxious.        Past Medical History:  has a past medical history of Atrial flutter (HCC), Cancer of appendix (HCC), Diabetes mellitus (HCC), History of kidney stones, Hyperlipidemia, and Hypertension.    Current Medications:    Current Outpatient Medications   Medication Instructions    albuterol sulfate HFA (PROVENTIL;VENTOLIN;PROAIR) 108 (90 Base) MCG/ACT inhaler inhale 2 puffs by mouth every 6 hours as needed for cough or wheezing    aluminum & magnesium hydroxide-simethicone (MAALOX) 200-200-20 MG/5ML SUSP

## 2024-12-30 ENCOUNTER — HOSPITAL ENCOUNTER (OUTPATIENT)
Age: 84
Discharge: HOME | End: 2024-12-30
Payer: MEDICARE

## 2024-12-30 DIAGNOSIS — M05.9 SEROPOSITIVE RHEUMATOID ARTHRITIS (HCC): ICD-10-CM

## 2024-12-30 DIAGNOSIS — Z51.81 MEDICATION MONITORING ENCOUNTER: ICD-10-CM

## 2024-12-30 LAB
ALBUMIN SERPL BCG-MCNC: 3.9 G/DL (ref 3.5–5.1)
ALP SERPL-CCNC: 56 U/L (ref 38–126)
ALT SERPL W/O P-5'-P-CCNC: 17 U/L (ref 11–66)
ANION GAP SERPL CALC-SCNC: 11 MEQ/L (ref 8–16)
AST SERPL-CCNC: 17 U/L (ref 5–40)
BASOPHILS ABSOLUTE: 0.1 THOU/MM3 (ref 0–0.1)
BASOPHILS NFR BLD AUTO: 1.2 %
BILIRUB SERPL-MCNC: 0.3 MG/DL (ref 0.3–1.2)
BUN SERPL-MCNC: 11 MG/DL (ref 7–22)
CALCIUM SERPL-MCNC: 9.1 MG/DL (ref 8.5–10.5)
CHLORIDE SERPL-SCNC: 98 MEQ/L (ref 98–111)
CO2 SERPL-SCNC: 27 MEQ/L (ref 23–33)
CREAT SERPL-MCNC: 1.3 MG/DL (ref 0.4–1.2)
CRP SERPL-MCNC: < 0.3 MG/DL (ref 0–1)
DEPRECATED RDW RBC AUTO: 52.4 FL (ref 35–45)
EOSINOPHIL NFR BLD AUTO: 4.3 %
EOSINOPHILS ABSOLUTE: 0.3 THOU/MM3 (ref 0–0.4)
ERYTHROCYTE [DISTWIDTH] IN BLOOD BY AUTOMATED COUNT: 14.7 % (ref 11.5–14.5)
ERYTHROCYTE [SEDIMENTATION RATE] IN BLOOD BY WESTERGREN METHOD: 25 MM/HR (ref 0–10)
GFR SERPL CREATININE-BSD FRML MDRD: 54 ML/MIN/1.73M2
GLUCOSE SERPL-MCNC: 110 MG/DL (ref 70–108)
HCT VFR BLD AUTO: 40.8 % (ref 42–52)
HGB BLD-MCNC: 13.7 GM/DL (ref 14–18)
IMM GRANULOCYTES # BLD AUTO: 0.02 THOU/MM3 (ref 0–0.07)
IMM GRANULOCYTES NFR BLD AUTO: 0.3 %
LYMPHOCYTES ABSOLUTE: 1.2 THOU/MM3 (ref 1–4.8)
LYMPHOCYTES NFR BLD AUTO: 18 %
MCH RBC QN AUTO: 33 PG (ref 26–33)
MCHC RBC AUTO-ENTMCNC: 33.6 GM/DL (ref 32.2–35.5)
MCV RBC AUTO: 98.3 FL (ref 80–94)
MONOCYTES ABSOLUTE: 0.9 THOU/MM3 (ref 0.4–1.3)
MONOCYTES NFR BLD AUTO: 13.8 %
NEUTROPHILS ABSOLUTE: 4.2 THOU/MM3 (ref 1.8–7.7)
NEUTROPHILS NFR BLD AUTO: 62.4 %
NRBC BLD AUTO-RTO: 0 /100 WBC
PLATELET # BLD AUTO: 235 THOU/MM3 (ref 130–400)
PMV BLD AUTO: 10.5 FL (ref 9.4–12.4)
POTASSIUM SERPL-SCNC: 3.8 MEQ/L (ref 3.5–5.2)
PROT SERPL-MCNC: 7.2 G/DL (ref 6.1–8)
RBC # BLD AUTO: 4.15 MILL/MM3 (ref 4.7–6.1)
SODIUM SERPL-SCNC: 136 MEQ/L (ref 135–145)
WBC # BLD AUTO: 6.7 THOU/MM3 (ref 4.8–10.8)

## 2024-12-30 PROCEDURE — 86140 C-REACTIVE PROTEIN: CPT

## 2024-12-30 PROCEDURE — 80053 COMPREHEN METABOLIC PANEL: CPT

## 2024-12-30 PROCEDURE — 36415 COLL VENOUS BLD VENIPUNCTURE: CPT

## 2024-12-30 PROCEDURE — 85025 COMPLETE CBC W/AUTO DIFF WBC: CPT

## 2024-12-30 PROCEDURE — 85651 RBC SED RATE NONAUTOMATED: CPT

## 2025-01-01 RX ORDER — METHOTREXATE 2.5 MG/1
15 TABLET ORAL WEEKLY
Qty: 24 TABLET | Refills: 1 | Status: SHIPPED | OUTPATIENT
Start: 2025-01-01 | End: 2025-01-31

## 2025-01-06 ENCOUNTER — TELEPHONE (OUTPATIENT)
Age: 85
End: 2025-01-06

## 2025-01-06 NOTE — TELEPHONE ENCOUNTER
----- Message from SERVANDO Huizar CNP sent at 1/1/2025  6:18 PM EST -----  Blood testing with a mildly elevated inflammatory marker, a mild anemia, and a mildly elevated kidney function test which is stable.methotrexate refilled. Repeat labs in 8 weeks.

## 2025-01-06 NOTE — TELEPHONE ENCOUNTER
Attempted to contact the pt, no answer. Left message for the pt to contact the office to discuss.

## 2025-01-23 ENCOUNTER — OFFICE VISIT (OUTPATIENT)
Age: 85
End: 2025-01-23
Payer: MEDICARE

## 2025-01-23 VITALS
BODY MASS INDEX: 33.46 KG/M2 | DIASTOLIC BLOOD PRESSURE: 84 MMHG | OXYGEN SATURATION: 96 % | SYSTOLIC BLOOD PRESSURE: 128 MMHG | WEIGHT: 233.7 LBS | HEART RATE: 102 BPM | HEIGHT: 70 IN

## 2025-01-23 DIAGNOSIS — M19.011 OSTEOARTHRITIS OF BOTH SHOULDERS, UNSPECIFIED OSTEOARTHRITIS TYPE: ICD-10-CM

## 2025-01-23 DIAGNOSIS — M19.012 OSTEOARTHRITIS OF BOTH SHOULDERS, UNSPECIFIED OSTEOARTHRITIS TYPE: ICD-10-CM

## 2025-01-23 DIAGNOSIS — M47.816 OSTEOARTHRITIS OF LUMBAR SPINE, UNSPECIFIED SPINAL OSTEOARTHRITIS COMPLICATION STATUS: ICD-10-CM

## 2025-01-23 DIAGNOSIS — Z51.81 MEDICATION MONITORING ENCOUNTER: ICD-10-CM

## 2025-01-23 DIAGNOSIS — M05.9 SEROPOSITIVE RHEUMATOID ARTHRITIS (HCC): Primary | ICD-10-CM

## 2025-01-23 PROCEDURE — 1159F MED LIST DOCD IN RCRD: CPT | Performed by: INTERNAL MEDICINE

## 2025-01-23 PROCEDURE — G2211 COMPLEX E/M VISIT ADD ON: HCPCS | Performed by: INTERNAL MEDICINE

## 2025-01-23 PROCEDURE — 1123F ACP DISCUSS/DSCN MKR DOCD: CPT | Performed by: INTERNAL MEDICINE

## 2025-01-23 PROCEDURE — G8427 DOCREV CUR MEDS BY ELIG CLIN: HCPCS | Performed by: INTERNAL MEDICINE

## 2025-01-23 PROCEDURE — 1160F RVW MEDS BY RX/DR IN RCRD: CPT | Performed by: INTERNAL MEDICINE

## 2025-01-23 PROCEDURE — 1036F TOBACCO NON-USER: CPT | Performed by: INTERNAL MEDICINE

## 2025-01-23 PROCEDURE — 1126F AMNT PAIN NOTED NONE PRSNT: CPT | Performed by: INTERNAL MEDICINE

## 2025-01-23 PROCEDURE — G8417 CALC BMI ABV UP PARAM F/U: HCPCS | Performed by: INTERNAL MEDICINE

## 2025-01-23 PROCEDURE — 99213 OFFICE O/P EST LOW 20 MIN: CPT | Performed by: INTERNAL MEDICINE

## 2025-01-23 PROCEDURE — 99214 OFFICE O/P EST MOD 30 MIN: CPT | Performed by: INTERNAL MEDICINE

## 2025-01-23 RX ORDER — LEFLUNOMIDE 20 MG/1
20 TABLET ORAL WEEKLY
Qty: 8 TABLET | Refills: 0 | Status: SHIPPED | OUTPATIENT
Start: 2025-01-23

## 2025-01-23 ASSESSMENT — JOINT PAIN
TOTAL NUMBER OF SWOLLEN JOINTS: 0
TOTAL NUMBER OF TENDER JOINTS: 6

## 2025-01-23 ASSESSMENT — ENCOUNTER SYMPTOMS
EYES NEGATIVE: 1
SHORTNESS OF BREATH: 1
GASTROINTESTINAL NEGATIVE: 1

## 2025-01-23 NOTE — PROGRESS NOTES
[facility] RHEUMATOLOGY FOLLOW UP NOTE     Date Of Service: 1/23/2025  Provider: KASSY LANGFORD DO, DO  PCP: Fredis Wise APRN - [place]   Name: Rory Grullon   MRN: [id number]    Subjective   CHIEF COMPLAINT:    No chief complaint on file.    Rory Grullon  is a(n)84 y.o. male  here for the f/u evaluation of rheumatoid arthritis       Rheumatoid Arthritis   Osteoarthritis     - taking Methotrexate 15mg q wk    - no flares of the ra reports per pt and wife.    - occasionally fatigue / tiredness with walking through stores.    - denies any joint swelling or morning stiffness, falls,    - ambulating with walker or cane - slow shuffled gait per wife.     REVIEW OF SYSTEMS: (ROS)    Review of Systems   Constitutional: Negative.    HENT: Negative.     Eyes: Negative.    Respiratory:  Positive for shortness of breath.    Cardiovascular: Negative.    Gastrointestinal: Negative.    Endocrine: Negative.    Genitourinary: Negative.    Skin: Negative.    Neurological: Negative.    Hematological: Negative.        Past Medical History:  has a past medical history of Atrial flutter (HCC), Cancer of appendix (HCC), Diabetes mellitus (HCC), History of kidney stones, Hyperlipidemia, and Hypertension.    Current Medications:    Current Outpatient Medications   Medication Instructions    albuterol sulfate HFA (PROVENTIL;VENTOLIN;PROAIR) 108 (90 Base) MCG/ACT inhaler inhale 2 puffs by mouth every 6 hours as needed for cough or wheezing    aluminum & magnesium hydroxide-simethicone (MAALOX) 200-200-20 MG/5ML SUSP suspension 5 mLs, Oral, EVERY 6 HOURS PRN    calcium carbonate (TUMS) 500 MG chewable tablet 1 tablet, Oral, DAILY    Cholecalciferol (VITAMIN D3) 2000 units CAPS 1 capsule, Oral, 2 TIMES DAILY    diclofenac sodium (VOLTAREN) 1 % GEL Topical, 4 TIMES DAILY PRN    folic acid (FOLVITE) 1 mg, Oral, DAILY    FREESTYLE LITE strip     hydroCHLOROthiazide (HYDRODIURIL) 12.5 mg, Oral, DAILY    losartan (COZAAR) 25 mg, Oral,

## 2025-02-14 ENCOUNTER — HOSPITAL ENCOUNTER (OUTPATIENT)
Age: 85
Discharge: HOME | End: 2025-02-14
Payer: MEDICARE

## 2025-02-14 DIAGNOSIS — Z51.81 MEDICATION MONITORING ENCOUNTER: ICD-10-CM

## 2025-02-14 DIAGNOSIS — M05.9 SEROPOSITIVE RHEUMATOID ARTHRITIS (HCC): ICD-10-CM

## 2025-02-14 LAB
ALBUMIN SERPL BCG-MCNC: 4.1 G/DL (ref 3.5–5.1)
ALP SERPL-CCNC: 53 U/L (ref 38–126)
ALT SERPL W/O P-5'-P-CCNC: 22 U/L (ref 11–66)
ANION GAP SERPL CALC-SCNC: 12 MEQ/L (ref 8–16)
AST SERPL-CCNC: 25 U/L (ref 5–40)
BASOPHILS ABSOLUTE: 0.1 THOU/MM3 (ref 0–0.1)
BASOPHILS NFR BLD AUTO: 1.1 %
BILIRUB SERPL-MCNC: 0.3 MG/DL (ref 0.3–1.2)
BUN SERPL-MCNC: 13 MG/DL (ref 7–22)
CALCIUM SERPL-MCNC: 8.8 MG/DL (ref 8.5–10.5)
CHLORIDE SERPL-SCNC: 93 MEQ/L (ref 98–111)
CO2 SERPL-SCNC: 25 MEQ/L (ref 23–33)
CREAT SERPL-MCNC: 1.2 MG/DL (ref 0.4–1.2)
CRP SERPL-MCNC: 1.84 MG/DL (ref 0–1)
DEPRECATED RDW RBC AUTO: 53.6 FL (ref 35–45)
EOSINOPHIL NFR BLD AUTO: 1.1 %
EOSINOPHILS ABSOLUTE: 0.1 THOU/MM3 (ref 0–0.4)
ERYTHROCYTE [DISTWIDTH] IN BLOOD BY AUTOMATED COUNT: 15.1 % (ref 11.5–14.5)
ERYTHROCYTE [SEDIMENTATION RATE] IN BLOOD BY WESTERGREN METHOD: 38 MM/HR (ref 0–10)
GFR SERPL CREATININE-BSD FRML MDRD: 59 ML/MIN/1.73M2
GLUCOSE SERPL-MCNC: 119 MG/DL (ref 70–108)
HCT VFR BLD AUTO: 41.2 % (ref 42–52)
HGB BLD-MCNC: 13.5 GM/DL (ref 14–18)
IMM GRANULOCYTES # BLD AUTO: 0.02 THOU/MM3 (ref 0–0.07)
IMM GRANULOCYTES NFR BLD AUTO: 0.4 %
LYMPHOCYTES ABSOLUTE: 1 THOU/MM3 (ref 1–4.8)
LYMPHOCYTES NFR BLD AUTO: 18.2 %
MCH RBC QN AUTO: 32.1 PG (ref 26–33)
MCHC RBC AUTO-ENTMCNC: 32.8 GM/DL (ref 32.2–35.5)
MCV RBC AUTO: 97.9 FL (ref 80–94)
MONOCYTES ABSOLUTE: 1 THOU/MM3 (ref 0.4–1.3)
MONOCYTES NFR BLD AUTO: 18.4 %
NEUTROPHILS ABSOLUTE: 3.3 THOU/MM3 (ref 1.8–7.7)
NEUTROPHILS NFR BLD AUTO: 60.8 %
NRBC BLD AUTO-RTO: 0 /100 WBC
PLATELET # BLD AUTO: 188 THOU/MM3 (ref 130–400)
PMV BLD AUTO: 10.3 FL (ref 9.4–12.4)
POTASSIUM SERPL-SCNC: 3.6 MEQ/L (ref 3.5–5.2)
PROT SERPL-MCNC: 7.2 G/DL (ref 6.1–8)
RBC # BLD AUTO: 4.21 MILL/MM3 (ref 4.7–6.1)
SODIUM SERPL-SCNC: 130 MEQ/L (ref 135–145)
WBC # BLD AUTO: 5.5 THOU/MM3 (ref 4.8–10.8)

## 2025-02-14 PROCEDURE — 36415 COLL VENOUS BLD VENIPUNCTURE: CPT

## 2025-02-14 PROCEDURE — 85651 RBC SED RATE NONAUTOMATED: CPT

## 2025-02-14 PROCEDURE — 80053 COMPREHEN METABOLIC PANEL: CPT

## 2025-02-14 PROCEDURE — 86140 C-REACTIVE PROTEIN: CPT

## 2025-02-14 PROCEDURE — 85025 COMPLETE CBC W/AUTO DIFF WBC: CPT

## 2025-02-17 ENCOUNTER — TELEPHONE (OUTPATIENT)
Age: 85
End: 2025-02-17

## 2025-02-17 DIAGNOSIS — M05.9 SEROPOSITIVE RHEUMATOID ARTHRITIS (HCC): ICD-10-CM

## 2025-02-17 RX ORDER — METHOTREXATE 2.5 MG/1
15 TABLET ORAL WEEKLY
Qty: 24 TABLET | Refills: 0 | Status: SHIPPED | OUTPATIENT
Start: 2025-02-17 | End: 2025-03-19

## 2025-02-17 RX ORDER — LEFLUNOMIDE 20 MG/1
20 TABLET ORAL WEEKLY
Qty: 8 TABLET | Refills: 0 | Status: SHIPPED | OUTPATIENT
Start: 2025-02-17

## 2025-02-17 NOTE — TELEPHONE ENCOUNTER
----- Message from SERVANDO Huizar CNP sent at 2/17/2025  1:45 PM EST -----  The inflammatory markers are elevated. Blood counts, liver and kidneys are stable. Medications refilled. Repeat labs in 4 weeks x 2.

## 2025-03-03 RX ORDER — METHOTREXATE 2.5 MG/1
15 TABLET ORAL WEEKLY
Qty: 24 TABLET | Refills: 0 | OUTPATIENT
Start: 2025-03-03 | End: 2025-04-02

## 2025-03-03 NOTE — TELEPHONE ENCOUNTER
Rory Grullon called requesting a refill on the following medications:  Requested Prescriptions     Pending Prescriptions Disp Refills    methotrexate (RHEUMATREX) 2.5 MG chemo tablet 24 tablet 0     Sig: Take 6 tablets by mouth once a week     Pharmacy verified:  [email or web address]  [id number] - [place], [place] - [address] - P [phone] - F [phone]      Date of last visit: 01/23/2025  Date of next visit (if applicable): 3/25/2025

## 2025-03-10 NOTE — PROGRESS NOTES
[facility] [place] [facility]  [facility]  [address]  [address]  [place] [id number]  Dept: [phone]  Loc: [phone]   Hematology/Oncology Progress Note (Clinic)        Rory VAZQUEZ Zeferinosilvianocecy  1940    3/11/25    No ref. provider found   Fredis Wise, APRN - CNP     DIAGNOSIS:   -Low-grade appendiceal mucinous neoplasm, pTis (LAMN). Dx: May 2018  Detected on CT abdomen 4/23/2018 and confirmed at laparoscopy and appendectomy May 31, 2018.  -Prostate adenocarcinoma.  Dx July 2019  Pathologic stage T3a N0 Deerfield score 7 (4+3), grade group 3 ,all  6 right pelvic nodes uninvolved 1 left pelvic lymph node uninvolved.  Perineal invasion present and extraprostatic extension present.    TREATMENT:   -Diagnostic laparoscopy with appendectomy 5/31/2018  Due to positive proximal margin patient underwent right colon resection 6/21/2018.    Negative for malignancy and all 12 pericolonic lymph nodes were negative for malignancy.  -Radical prostatectomy 9/4/2019    Followable Disease:   -Abdominal pelvic CT. 8/31/2023, 3/24/2021  -Chest CT with contrast 6/15/2022  -PSA (8/3/2022) = <0.02  -CEA (2/25/22) = 1.8    Comorbidities:  See below.  Include atrial flutter, prediabetes, nephrolithiasis, hyperlipidemia and hypertension      Subjective: Per patient with Dr. Diaz last seen 2/25/2022.  He is doing well with no evidence of recurrence.  Last seen by me 2 years ago March 2023.  Seen 1 year ago March 24 by Sejal Russell  nurse practitioner.  Here for routine follow-up.  He has mild dementia.  Followed closely by urology for his prostate cancer.  Patient and his wife both deny any new focal or systemic complaints or concerns.    ROS:  Review of Systems     PMH:   Past Medical History:   Diagnosis Date    Atrial flutter (HCC)     Cancer of appendix (HCC)     Diabetes mellitus (HCC)     diet and exercise controlled    History of kidney stones 1996    Hyperlipidemia

## 2025-03-11 ENCOUNTER — HOSPITAL ENCOUNTER (OUTPATIENT)
Dept: INFUSION THERAPY | Age: 85
Discharge: HOME | End: 2025-03-11
Payer: MEDICARE

## 2025-03-11 ENCOUNTER — OFFICE VISIT (OUTPATIENT)
Dept: ONCOLOGY | Age: 85
End: 2025-03-11
Payer: MEDICARE

## 2025-03-11 VITALS
HEART RATE: 90 BPM | TEMPERATURE: 98 F | WEIGHT: 238.6 LBS | OXYGEN SATURATION: 96 % | DIASTOLIC BLOOD PRESSURE: 70 MMHG | RESPIRATION RATE: 16 BRPM | BODY MASS INDEX: 34.16 KG/M2 | HEIGHT: 70 IN | SYSTOLIC BLOOD PRESSURE: 148 MMHG

## 2025-03-11 VITALS
SYSTOLIC BLOOD PRESSURE: 148 MMHG | OXYGEN SATURATION: 96 % | DIASTOLIC BLOOD PRESSURE: 70 MMHG | RESPIRATION RATE: 16 BRPM | TEMPERATURE: 98 F | HEART RATE: 90 BPM

## 2025-03-11 DIAGNOSIS — Z85.46 HISTORY OF PROSTATE CANCER: ICD-10-CM

## 2025-03-11 DIAGNOSIS — D37.3 LOW GRADE MUCINOUS NEOPLASM OF APPENDIX: ICD-10-CM

## 2025-03-11 DIAGNOSIS — D37.3 LOW GRADE MUCINOUS NEOPLASM OF APPENDIX: Primary | ICD-10-CM

## 2025-03-11 PROCEDURE — 1036F TOBACCO NON-USER: CPT | Performed by: INTERNAL MEDICINE

## 2025-03-11 PROCEDURE — 1126F AMNT PAIN NOTED NONE PRSNT: CPT | Performed by: INTERNAL MEDICINE

## 2025-03-11 PROCEDURE — 99213 OFFICE O/P EST LOW 20 MIN: CPT | Performed by: INTERNAL MEDICINE

## 2025-03-11 PROCEDURE — G8427 DOCREV CUR MEDS BY ELIG CLIN: HCPCS | Performed by: INTERNAL MEDICINE

## 2025-03-11 PROCEDURE — 99211 OFF/OP EST MAY X REQ PHY/QHP: CPT

## 2025-03-11 PROCEDURE — 1123F ACP DISCUSS/DSCN MKR DOCD: CPT | Performed by: INTERNAL MEDICINE

## 2025-03-11 PROCEDURE — G8417 CALC BMI ABV UP PARAM F/U: HCPCS | Performed by: INTERNAL MEDICINE

## 2025-03-11 PROCEDURE — 1159F MED LIST DOCD IN RCRD: CPT | Performed by: INTERNAL MEDICINE

## 2025-03-14 ENCOUNTER — HOSPITAL ENCOUNTER (OUTPATIENT)
Age: 85
Discharge: HOME | End: 2025-03-14
Payer: MEDICARE

## 2025-03-14 DIAGNOSIS — Z51.81 MEDICATION MONITORING ENCOUNTER: ICD-10-CM

## 2025-03-14 DIAGNOSIS — M05.9 SEROPOSITIVE RHEUMATOID ARTHRITIS (HCC): ICD-10-CM

## 2025-03-14 LAB
ALBUMIN SERPL BCG-MCNC: 3.9 G/DL (ref 3.4–4.9)
ALP SERPL-CCNC: 56 U/L (ref 40–129)
ALT SERPL W/O P-5'-P-CCNC: 27 U/L (ref 10–50)
ANION GAP SERPL CALC-SCNC: 11 MEQ/L (ref 8–16)
AST SERPL-CCNC: 29 U/L (ref 10–50)
BASOPHILS ABSOLUTE: 0.1 THOU/MM3 (ref 0–0.1)
BASOPHILS NFR BLD AUTO: 1 %
BILIRUB SERPL-MCNC: 0.4 MG/DL (ref 0.3–1.2)
BUN SERPL-MCNC: 12 MG/DL (ref 8–23)
CALCIUM SERPL-MCNC: 9.3 MG/DL (ref 8.8–10.2)
CHLORIDE SERPL-SCNC: 97 MEQ/L (ref 98–111)
CO2 SERPL-SCNC: 26 MEQ/L (ref 22–29)
CREAT SERPL-MCNC: 1.2 MG/DL (ref 0.7–1.2)
CRP SERPL-MCNC: 0.56 MG/DL (ref 0–0.5)
DEPRECATED RDW RBC AUTO: 54.7 FL (ref 35–45)
EOSINOPHIL NFR BLD AUTO: 4.8 %
EOSINOPHILS ABSOLUTE: 0.3 THOU/MM3 (ref 0–0.4)
ERYTHROCYTE [DISTWIDTH] IN BLOOD BY AUTOMATED COUNT: 15.5 % (ref 11.5–14.5)
ERYTHROCYTE [SEDIMENTATION RATE] IN BLOOD BY WESTERGREN METHOD: 44 MM/HR (ref 0–10)
GFR SERPL CREATININE-BSD FRML MDRD: 59 ML/MIN/1.73M2
GLUCOSE SERPL-MCNC: 133 MG/DL (ref 74–109)
HCT VFR BLD AUTO: 40.3 % (ref 42–52)
HGB BLD-MCNC: 13.1 GM/DL (ref 14–18)
IMM GRANULOCYTES # BLD AUTO: 0.02 THOU/MM3 (ref 0–0.07)
IMM GRANULOCYTES NFR BLD AUTO: 0.3 %
LYMPHOCYTES ABSOLUTE: 1 THOU/MM3 (ref 1–4.8)
LYMPHOCYTES NFR BLD AUTO: 17.2 %
MCH RBC QN AUTO: 32 PG (ref 26–33)
MCHC RBC AUTO-ENTMCNC: 32.5 GM/DL (ref 32.2–35.5)
MCV RBC AUTO: 98.5 FL (ref 80–94)
MONOCYTES ABSOLUTE: 0.9 THOU/MM3 (ref 0.4–1.3)
MONOCYTES NFR BLD AUTO: 15.6 %
NEUTROPHILS ABSOLUTE: 3.7 THOU/MM3 (ref 1.8–7.7)
NEUTROPHILS NFR BLD AUTO: 61.1 %
NRBC BLD AUTO-RTO: 0 /100 WBC
PLATELET # BLD AUTO: 249 THOU/MM3 (ref 130–400)
PMV BLD AUTO: 9.9 FL (ref 9.4–12.4)
POTASSIUM SERPL-SCNC: 4.2 MEQ/L (ref 3.5–5.2)
PROT SERPL-MCNC: 7.4 G/DL (ref 6.4–8.3)
RBC # BLD AUTO: 4.09 MILL/MM3 (ref 4.7–6.1)
SODIUM SERPL-SCNC: 134 MEQ/L (ref 135–145)
WBC # BLD AUTO: 6 THOU/MM3 (ref 4.8–10.8)

## 2025-03-14 PROCEDURE — 85025 COMPLETE CBC W/AUTO DIFF WBC: CPT

## 2025-03-14 PROCEDURE — 80053 COMPREHEN METABOLIC PANEL: CPT

## 2025-03-14 PROCEDURE — 85651 RBC SED RATE NONAUTOMATED: CPT

## 2025-03-14 PROCEDURE — 86140 C-REACTIVE PROTEIN: CPT

## 2025-03-14 PROCEDURE — 36415 COLL VENOUS BLD VENIPUNCTURE: CPT

## 2025-03-17 ENCOUNTER — RESULTS FOLLOW-UP (OUTPATIENT)
Dept: GENERAL RADIOLOGY | Age: 85
End: 2025-03-17

## 2025-03-17 DIAGNOSIS — M05.9 SEROPOSITIVE RHEUMATOID ARTHRITIS (HCC): ICD-10-CM

## 2025-03-17 RX ORDER — LEFLUNOMIDE 20 MG/1
20 TABLET ORAL WEEKLY
Qty: 8 TABLET | Refills: 0 | Status: SHIPPED | OUTPATIENT
Start: 2025-03-17 | End: 2025-03-25 | Stop reason: SDUPTHER

## 2025-03-17 RX ORDER — METHOTREXATE 2.5 MG/1
15 TABLET ORAL WEEKLY
Qty: 24 TABLET | Refills: 0 | Status: SHIPPED | OUTPATIENT
Start: 2025-03-17 | End: 2025-04-12 | Stop reason: SDUPTHER

## 2025-03-25 ENCOUNTER — OFFICE VISIT (OUTPATIENT)
Age: 85
End: 2025-03-25
Payer: MEDICARE

## 2025-03-25 VITALS
DIASTOLIC BLOOD PRESSURE: 82 MMHG | SYSTOLIC BLOOD PRESSURE: 130 MMHG | HEART RATE: 84 BPM | BODY MASS INDEX: 33.76 KG/M2 | OXYGEN SATURATION: 98 % | WEIGHT: 235.8 LBS | HEIGHT: 70 IN

## 2025-03-25 DIAGNOSIS — M19.011 OSTEOARTHRITIS OF BOTH SHOULDERS, UNSPECIFIED OSTEOARTHRITIS TYPE: ICD-10-CM

## 2025-03-25 DIAGNOSIS — M19.012 OSTEOARTHRITIS OF BOTH SHOULDERS, UNSPECIFIED OSTEOARTHRITIS TYPE: ICD-10-CM

## 2025-03-25 DIAGNOSIS — R09.89 RESPIRATORY CRACKLES: ICD-10-CM

## 2025-03-25 DIAGNOSIS — Z51.81 MEDICATION MONITORING ENCOUNTER: ICD-10-CM

## 2025-03-25 DIAGNOSIS — M05.9 SEROPOSITIVE RHEUMATOID ARTHRITIS (HCC): ICD-10-CM

## 2025-03-25 DIAGNOSIS — M47.816 OSTEOARTHRITIS OF LUMBAR SPINE, UNSPECIFIED SPINAL OSTEOARTHRITIS COMPLICATION STATUS: Primary | ICD-10-CM

## 2025-03-25 PROCEDURE — 99214 OFFICE O/P EST MOD 30 MIN: CPT | Performed by: NURSE PRACTITIONER

## 2025-03-25 PROCEDURE — 1036F TOBACCO NON-USER: CPT | Performed by: NURSE PRACTITIONER

## 2025-03-25 PROCEDURE — 1160F RVW MEDS BY RX/DR IN RCRD: CPT | Performed by: NURSE PRACTITIONER

## 2025-03-25 PROCEDURE — G2211 COMPLEX E/M VISIT ADD ON: HCPCS | Performed by: NURSE PRACTITIONER

## 2025-03-25 PROCEDURE — 1159F MED LIST DOCD IN RCRD: CPT | Performed by: NURSE PRACTITIONER

## 2025-03-25 PROCEDURE — G8427 DOCREV CUR MEDS BY ELIG CLIN: HCPCS | Performed by: NURSE PRACTITIONER

## 2025-03-25 PROCEDURE — 99213 OFFICE O/P EST LOW 20 MIN: CPT | Performed by: NURSE PRACTITIONER

## 2025-03-25 PROCEDURE — G8417 CALC BMI ABV UP PARAM F/U: HCPCS | Performed by: NURSE PRACTITIONER

## 2025-03-25 PROCEDURE — 1125F AMNT PAIN NOTED PAIN PRSNT: CPT | Performed by: NURSE PRACTITIONER

## 2025-03-25 PROCEDURE — 1123F ACP DISCUSS/DSCN MKR DOCD: CPT | Performed by: NURSE PRACTITIONER

## 2025-03-25 RX ORDER — LEFLUNOMIDE 20 MG/1
40 TABLET ORAL WEEKLY
Qty: 8 TABLET | Refills: 0 | Status: SHIPPED | OUTPATIENT
Start: 2025-03-25

## 2025-03-25 RX ORDER — PREDNISONE 5 MG/1
TABLET ORAL
Qty: 40 TABLET | Refills: 0 | Status: SHIPPED | OUTPATIENT
Start: 2025-03-25 | End: 2025-04-10

## 2025-03-25 ASSESSMENT — ENCOUNTER SYMPTOMS
DIARRHEA: 0
ABDOMINAL PAIN: 0
NAUSEA: 0
EYE ITCHING: 0
EYE PAIN: 0
SHORTNESS OF BREATH: 1
BACK PAIN: 0
CONSTIPATION: 0
TROUBLE SWALLOWING: 0
COUGH: 0

## 2025-03-25 NOTE — PROGRESS NOTES
[facility] RHEUMATOLOGY FOLLOW UP NOTE     Date Of Service: 3/25/2025  Provider: SERVANDO Ceballos CNP   PCP: Fredis Wise APRN - CNP   Name: Rory Grullon   MRN: [id number]    Subjective   CHIEF COMPLAINT:    No chief complaint on file.      Rory Grullon  is a(n)84 y.o. male  here for the f/u evaluation of rheumatoid arthritis     Interval hx:    - started arava once weekly after last evaluation- tolerating      Left elbow pain starting about 2-3 days ago. 5.5/10. constant. Aching. No aggravating or alleviating factors.     Denies any swelling or morning stiffness.     REVIEW OF SYSTEMS: (ROS)    Review of Systems   Constitutional:  Negative for fatigue, fever and unexpected weight change.   HENT:  Negative for congestion and trouble swallowing.    Eyes:  Negative for pain and itching.   Respiratory:  Positive for shortness of breath. Negative for cough.    Cardiovascular:  Negative for chest pain and leg swelling.   Gastrointestinal:  Negative for abdominal pain, constipation, diarrhea and nausea.   Endocrine: Negative for cold intolerance and heat intolerance.   Genitourinary:  Negative for difficulty urinating, frequency and urgency.   Musculoskeletal:  Negative for arthralgias, back pain and joint swelling.   Skin:  Negative for rash.   Neurological:  Negative for dizziness, weakness, numbness and headaches.   Psychiatric/Behavioral:  The patient is not nervous/anxious.        Past Medical History:  has a past medical history of Atrial flutter (HCC), Cancer of appendix (HCC), Diabetes mellitus (HCC), History of kidney stones, Hyperlipidemia, Hypertension, Memory loss, and Seropositive rheumatoid arthritis (HCC).    Current Medications:    Current Outpatient Medications   Medication Instructions    albuterol sulfate HFA (PROVENTIL;VENTOLIN;PROAIR) 108 (90 Base) MCG/ACT inhaler inhale 2 puffs by mouth every 6 hours as needed for cough or wheezing    aluminum & magnesium

## 2025-04-01 ENCOUNTER — OFFICE VISIT (OUTPATIENT)
Dept: UROLOGY | Age: 85
End: 2025-04-01
Payer: MEDICARE

## 2025-04-01 VITALS
BODY MASS INDEX: 33.61 KG/M2 | SYSTOLIC BLOOD PRESSURE: 134 MMHG | HEIGHT: 70 IN | WEIGHT: 234.8 LBS | DIASTOLIC BLOOD PRESSURE: 76 MMHG

## 2025-04-01 DIAGNOSIS — C61 PROSTATE CANCER (HCC): Primary | ICD-10-CM

## 2025-04-01 PROCEDURE — G8427 DOCREV CUR MEDS BY ELIG CLIN: HCPCS | Performed by: NURSE PRACTITIONER

## 2025-04-01 PROCEDURE — 1036F TOBACCO NON-USER: CPT | Performed by: NURSE PRACTITIONER

## 2025-04-01 PROCEDURE — 99214 OFFICE O/P EST MOD 30 MIN: CPT | Performed by: NURSE PRACTITIONER

## 2025-04-01 PROCEDURE — 1123F ACP DISCUSS/DSCN MKR DOCD: CPT | Performed by: NURSE PRACTITIONER

## 2025-04-01 PROCEDURE — 1159F MED LIST DOCD IN RCRD: CPT | Performed by: NURSE PRACTITIONER

## 2025-04-01 PROCEDURE — G8417 CALC BMI ABV UP PARAM F/U: HCPCS | Performed by: NURSE PRACTITIONER

## 2025-04-01 ASSESSMENT — ENCOUNTER SYMPTOMS
VOMITING: 0
ABDOMINAL PAIN: 0
NAUSEA: 0
BACK PAIN: 0

## 2025-04-01 NOTE — PROGRESS NOTES
[facility] [place] [facility]  [facility]  [address]  [address]  [facility] [address]  Dept: [phone]  Loc: [phone]    Visit Date: 4/1/2025        HPI:     Rory Grullon is a 84 y.o. male who presents today for:  Chief Complaint   Patient presents with    Prostate Cancer     PSA done in September       HPI    Pt seen in follow up for prostate cancer     Mr. Grullon has a hx of prostate cancer s/p Robotic-Assisted Laparoscopic Radical Prostatectomy (RALRP) and bilateral pelvic lymph node dissection and bilateral nerve wrap 9/4/19 by Dr. Carrasco.  Final pathology resulted [place] 4+3=7 invasive moderately differentiated adenocarcinoma with extraprostatic extension, seminal vesicles and lymph nodes negative for neoplasm.  Grade group 3.  PT3a, pN0.  Preoperative decipher testing high risk.  PSA 9/5/2024 <0.02.     Pt also has a hx of appendiceal ca s/p resection.  Follows with medical oncology.  Reports no evidence of recurrence thus far.       On trospium 20 mg night for nocturia which is worsening.  Pt has dementia and is having complete enuresis rather than awakening at night to urinate.     Current Outpatient Medications   Medication Sig Dispense Refill    predniSONE (DELTASONE) 5 MG tablet Take 4 tablets by mouth daily for 4 days, THEN 3 tablets daily for 4 days, THEN 2 tablets daily for 4 days, THEN 1 tablet daily for 4 days. 40 tablet 0    leflunomide (ARAVA) 20 MG tablet Take 2 tablets by mouth once a week 8 tablet 0    methotrexate (RHEUMATREX) 2.5 MG chemo tablet Take 6 tablets by mouth once a week 24 tablet 0    memantine (NAMENDA) 5 MG tablet Take 2 tablets by mouth 2 times daily      trospium (SANCTURA) 20 MG tablet Take 1 tablet by mouth at bedtime 90 tablet 3    folic acid (FOLVITE) 1 MG tablet Take 1 tablet by mouth daily 90 tablet 1    diclofenac sodium (VOLTAREN) 1 % GEL Apply topically 4 times daily as needed for Pain      rivastigmine (EXELON) 4.6

## 2025-04-11 ENCOUNTER — HOSPITAL ENCOUNTER (OUTPATIENT)
Dept: GENERAL RADIOLOGY | Age: 85
Discharge: HOME | End: 2025-04-11
Payer: MEDICARE

## 2025-04-11 DIAGNOSIS — Z51.81 MEDICATION MONITORING ENCOUNTER: ICD-10-CM

## 2025-04-11 DIAGNOSIS — Z51.81 MEDICATION MONITORING ENCOUNTER: Primary | ICD-10-CM

## 2025-04-11 LAB
ALBUMIN SERPL BCG-MCNC: 3.8 G/DL (ref 3.4–4.9)
ALP SERPL-CCNC: 50 U/L (ref 40–129)
ALT SERPL W/O P-5'-P-CCNC: 28 U/L (ref 10–50)
ANION GAP SERPL CALC-SCNC: 12 MEQ/L (ref 8–16)
AST SERPL-CCNC: 24 U/L (ref 10–50)
BASOPHILS ABSOLUTE: 0.1 THOU/MM3 (ref 0–0.1)
BASOPHILS NFR BLD AUTO: 0.9 %
BILIRUB SERPL-MCNC: 0.6 MG/DL (ref 0.3–1.2)
BUN SERPL-MCNC: 17 MG/DL (ref 8–23)
CALCIUM SERPL-MCNC: 9.5 MG/DL (ref 8.8–10.2)
CHLORIDE SERPL-SCNC: 98 MEQ/L (ref 98–111)
CO2 SERPL-SCNC: 27 MEQ/L (ref 22–29)
CREAT SERPL-MCNC: 1.3 MG/DL (ref 0.7–1.2)
CRP SERPL-MCNC: 0.81 MG/DL (ref 0–0.5)
DEPRECATED RDW RBC AUTO: 56.1 FL (ref 35–45)
EOSINOPHIL NFR BLD AUTO: 3.2 %
EOSINOPHILS ABSOLUTE: 0.2 THOU/MM3 (ref 0–0.4)
ERYTHROCYTE [DISTWIDTH] IN BLOOD BY AUTOMATED COUNT: 15.9 % (ref 11.5–14.5)
ERYTHROCYTE [SEDIMENTATION RATE] IN BLOOD BY WESTERGREN METHOD: 23 MM/HR (ref 0–10)
GFR SERPL CREATININE-BSD FRML MDRD: 54 ML/MIN/1.73M2
GLUCOSE SERPL-MCNC: 134 MG/DL (ref 74–109)
HCT VFR BLD AUTO: 42.2 % (ref 42–52)
HGB BLD-MCNC: 13.8 GM/DL (ref 14–18)
IMM GRANULOCYTES # BLD AUTO: 0.04 THOU/MM3 (ref 0–0.07)
IMM GRANULOCYTES NFR BLD AUTO: 0.6 %
LYMPHOCYTES ABSOLUTE: 1.2 THOU/MM3 (ref 1–4.8)
LYMPHOCYTES NFR BLD AUTO: 17.2 %
MCH RBC QN AUTO: 31.9 PG (ref 26–33)
MCHC RBC AUTO-ENTMCNC: 32.7 GM/DL (ref 32.2–35.5)
MCV RBC AUTO: 97.5 FL (ref 80–94)
MONOCYTES ABSOLUTE: 1 THOU/MM3 (ref 0.4–1.3)
MONOCYTES NFR BLD AUTO: 14.1 %
NEUTROPHILS ABSOLUTE: 4.4 THOU/MM3 (ref 1.8–7.7)
NEUTROPHILS NFR BLD AUTO: 64 %
NRBC BLD AUTO-RTO: 0 /100 WBC
PLATELET # BLD AUTO: 203 THOU/MM3 (ref 130–400)
PMV BLD AUTO: 10 FL (ref 9.4–12.4)
POTASSIUM SERPL-SCNC: 3.8 MEQ/L (ref 3.5–5.2)
PROT SERPL-MCNC: 7 G/DL (ref 6.4–8.3)
RBC # BLD AUTO: 4.33 MILL/MM3 (ref 4.7–6.1)
SODIUM SERPL-SCNC: 137 MEQ/L (ref 135–145)
WBC # BLD AUTO: 6.8 THOU/MM3 (ref 4.8–10.8)

## 2025-04-11 PROCEDURE — 86140 C-REACTIVE PROTEIN: CPT

## 2025-04-11 PROCEDURE — 85025 COMPLETE CBC W/AUTO DIFF WBC: CPT

## 2025-04-11 PROCEDURE — 85651 RBC SED RATE NONAUTOMATED: CPT

## 2025-04-11 PROCEDURE — 80053 COMPREHEN METABOLIC PANEL: CPT

## 2025-04-11 PROCEDURE — 36415 COLL VENOUS BLD VENIPUNCTURE: CPT

## 2025-04-12 ENCOUNTER — RESULTS FOLLOW-UP (OUTPATIENT)
Age: 85
End: 2025-04-12

## 2025-04-12 DIAGNOSIS — M05.9 SEROPOSITIVE RHEUMATOID ARTHRITIS (HCC): ICD-10-CM

## 2025-04-12 RX ORDER — METHOTREXATE 2.5 MG/1
15 TABLET ORAL WEEKLY
Qty: 24 TABLET | Refills: 0 | Status: SHIPPED | OUTPATIENT
Start: 2025-04-12 | End: 2025-05-12

## 2025-04-12 RX ORDER — LEFLUNOMIDE 20 MG/1
40 TABLET ORAL WEEKLY
Qty: 8 TABLET | Refills: 0 | Status: SHIPPED | OUTPATIENT
Start: 2025-04-12 | End: 2025-05-09 | Stop reason: SDUPTHER

## 2025-05-09 ENCOUNTER — RESULTS FOLLOW-UP (OUTPATIENT)
Age: 85
End: 2025-05-09

## 2025-05-09 ENCOUNTER — HOSPITAL ENCOUNTER (OUTPATIENT)
Age: 85
Discharge: HOME | End: 2025-05-09
Payer: MEDICARE

## 2025-05-09 DIAGNOSIS — Z51.81 MEDICATION MONITORING ENCOUNTER: ICD-10-CM

## 2025-05-09 DIAGNOSIS — M05.9 SEROPOSITIVE RHEUMATOID ARTHRITIS (HCC): ICD-10-CM

## 2025-05-09 LAB
ALBUMIN SERPL BCG-MCNC: 3.9 G/DL (ref 3.4–4.9)
ALP SERPL-CCNC: 60 U/L (ref 40–129)
ALT SERPL W/O P-5'-P-CCNC: 31 U/L (ref 10–50)
ANION GAP SERPL CALC-SCNC: 11 MEQ/L (ref 8–16)
AST SERPL-CCNC: 33 U/L (ref 10–50)
BASOPHILS ABSOLUTE: 0.1 THOU/MM3 (ref 0–0.1)
BASOPHILS NFR BLD AUTO: 1.4 %
BILIRUB SERPL-MCNC: 0.5 MG/DL (ref 0.3–1.2)
BUN SERPL-MCNC: 13 MG/DL (ref 8–23)
CALCIUM SERPL-MCNC: 9.2 MG/DL (ref 8.8–10.2)
CHLORIDE SERPL-SCNC: 99 MEQ/L (ref 98–111)
CO2 SERPL-SCNC: 26 MEQ/L (ref 22–29)
CREAT SERPL-MCNC: 1.3 MG/DL (ref 0.7–1.2)
CRP SERPL-MCNC: < 0.3 MG/DL (ref 0–0.5)
DEPRECATED RDW RBC AUTO: 56.1 FL (ref 35–45)
EOSINOPHIL NFR BLD AUTO: 3.9 %
EOSINOPHILS ABSOLUTE: 0.2 THOU/MM3 (ref 0–0.4)
ERYTHROCYTE [DISTWIDTH] IN BLOOD BY AUTOMATED COUNT: 15.9 % (ref 11.5–14.5)
ERYTHROCYTE [SEDIMENTATION RATE] IN BLOOD BY WESTERGREN METHOD: 40 MM/HR (ref 0–10)
GFR SERPL CREATININE-BSD FRML MDRD: 54 ML/MIN/1.73M2
GLUCOSE SERPL-MCNC: 141 MG/DL (ref 74–109)
HCT VFR BLD AUTO: 42 % (ref 42–52)
HGB BLD-MCNC: 13.6 GM/DL (ref 14–18)
IMM GRANULOCYTES # BLD AUTO: 0.01 THOU/MM3 (ref 0–0.07)
IMM GRANULOCYTES NFR BLD AUTO: 0.2 %
LYMPHOCYTES ABSOLUTE: 1 THOU/MM3 (ref 1–4.8)
LYMPHOCYTES NFR BLD AUTO: 19.9 %
MCH RBC QN AUTO: 31.8 PG (ref 26–33)
MCHC RBC AUTO-ENTMCNC: 32.4 GM/DL (ref 32.2–35.5)
MCV RBC AUTO: 98.1 FL (ref 80–94)
MONOCYTES ABSOLUTE: 0.9 THOU/MM3 (ref 0.4–1.3)
MONOCYTES NFR BLD AUTO: 16.6 %
NEUTROPHILS ABSOLUTE: 3 THOU/MM3 (ref 1.8–7.7)
NEUTROPHILS NFR BLD AUTO: 58 %
NRBC BLD AUTO-RTO: 0 /100 WBC
PLATELET # BLD AUTO: 222 THOU/MM3 (ref 130–400)
PMV BLD AUTO: 10.1 FL (ref 9.4–12.4)
POTASSIUM SERPL-SCNC: 4 MEQ/L (ref 3.5–5.2)
PROT SERPL-MCNC: 7.3 G/DL (ref 6.4–8.3)
RBC # BLD AUTO: 4.28 MILL/MM3 (ref 4.7–6.1)
SODIUM SERPL-SCNC: 136 MEQ/L (ref 135–145)
WBC # BLD AUTO: 5.2 THOU/MM3 (ref 4.8–10.8)

## 2025-05-09 PROCEDURE — 85651 RBC SED RATE NONAUTOMATED: CPT

## 2025-05-09 PROCEDURE — 80053 COMPREHEN METABOLIC PANEL: CPT

## 2025-05-09 PROCEDURE — 36415 COLL VENOUS BLD VENIPUNCTURE: CPT

## 2025-05-09 PROCEDURE — 85025 COMPLETE CBC W/AUTO DIFF WBC: CPT

## 2025-05-09 PROCEDURE — 86140 C-REACTIVE PROTEIN: CPT

## 2025-05-09 RX ORDER — LEFLUNOMIDE 20 MG/1
40 TABLET ORAL WEEKLY
Qty: 8 TABLET | Refills: 0 | Status: SHIPPED | OUTPATIENT
Start: 2025-05-09 | End: 2025-06-08 | Stop reason: SDUPTHER

## 2025-05-28 ENCOUNTER — OFFICE VISIT (OUTPATIENT)
Age: 85
End: 2025-05-28
Payer: MEDICARE

## 2025-05-28 VITALS
OXYGEN SATURATION: 97 % | DIASTOLIC BLOOD PRESSURE: 64 MMHG | HEART RATE: 82 BPM | SYSTOLIC BLOOD PRESSURE: 110 MMHG | HEIGHT: 70 IN | BODY MASS INDEX: 33.5 KG/M2 | WEIGHT: 234 LBS

## 2025-05-28 DIAGNOSIS — M19.012 OSTEOARTHRITIS OF BOTH SHOULDERS, UNSPECIFIED OSTEOARTHRITIS TYPE: ICD-10-CM

## 2025-05-28 DIAGNOSIS — Z51.81 MEDICATION MONITORING ENCOUNTER: ICD-10-CM

## 2025-05-28 DIAGNOSIS — M47.816 OSTEOARTHRITIS OF LUMBAR SPINE, UNSPECIFIED SPINAL OSTEOARTHRITIS COMPLICATION STATUS: ICD-10-CM

## 2025-05-28 DIAGNOSIS — M19.011 OSTEOARTHRITIS OF BOTH SHOULDERS, UNSPECIFIED OSTEOARTHRITIS TYPE: ICD-10-CM

## 2025-05-28 DIAGNOSIS — M05.9 SEROPOSITIVE RHEUMATOID ARTHRITIS (HCC): Primary | ICD-10-CM

## 2025-05-28 PROCEDURE — G8417 CALC BMI ABV UP PARAM F/U: HCPCS | Performed by: NURSE PRACTITIONER

## 2025-05-28 PROCEDURE — 99213 OFFICE O/P EST LOW 20 MIN: CPT | Performed by: NURSE PRACTITIONER

## 2025-05-28 PROCEDURE — 1036F TOBACCO NON-USER: CPT | Performed by: NURSE PRACTITIONER

## 2025-05-28 PROCEDURE — G8427 DOCREV CUR MEDS BY ELIG CLIN: HCPCS | Performed by: NURSE PRACTITIONER

## 2025-05-28 PROCEDURE — G2211 COMPLEX E/M VISIT ADD ON: HCPCS | Performed by: NURSE PRACTITIONER

## 2025-05-28 PROCEDURE — 1126F AMNT PAIN NOTED NONE PRSNT: CPT | Performed by: NURSE PRACTITIONER

## 2025-05-28 PROCEDURE — 1123F ACP DISCUSS/DSCN MKR DOCD: CPT | Performed by: NURSE PRACTITIONER

## 2025-05-28 PROCEDURE — 1159F MED LIST DOCD IN RCRD: CPT | Performed by: NURSE PRACTITIONER

## 2025-05-28 PROCEDURE — 99214 OFFICE O/P EST MOD 30 MIN: CPT | Performed by: NURSE PRACTITIONER

## 2025-05-28 PROCEDURE — 1160F RVW MEDS BY RX/DR IN RCRD: CPT | Performed by: NURSE PRACTITIONER

## 2025-05-28 RX ORDER — METHOTREXATE 2.5 MG/1
15 TABLET ORAL WEEKLY
Qty: 24 TABLET | Refills: 2 | Status: SHIPPED | OUTPATIENT
Start: 2025-05-28 | End: 2025-08-26

## 2025-05-28 ASSESSMENT — ENCOUNTER SYMPTOMS
ABDOMINAL PAIN: 0
DIARRHEA: 0
EYE ITCHING: 0
TROUBLE SWALLOWING: 0
CONSTIPATION: 0
SHORTNESS OF BREATH: 1
BACK PAIN: 0
COUGH: 0
EYE PAIN: 0
NAUSEA: 0

## 2025-05-28 NOTE — PROGRESS NOTES
05/09/2025    MCH 31.8 05/09/2025    MCHC 32.4 05/09/2025    RDW 14.7 03/20/2024         Chemistry        Component Value Date/Time     05/09/2025 0915    K 4.0 05/09/2025 0915    K 3.3 (L) 06/22/2018 0507    CL 99 05/09/2025 0915    CO2 26 05/09/2025 0915    BUN 13 05/09/2025 0915    CREATININE 1.3 (H) 05/09/2025 0915        Component Value Date/Time    CALCIUM 9.2 05/09/2025 0915    ALKPHOS 60 05/09/2025 0915    AST 33 05/09/2025 0915    ALT 31 05/09/2025 0915    BILITOT 0.5 05/09/2025 0915            Lab Results   Component Value Date    SEDRATE 40 (H) 05/09/2025    SEDRATE 23 (H) 04/11/2025    SEDRATE 44 (H) 03/14/2025    CRP < 0.30 05/09/2025    CRP 0.81 (H) 04/11/2025    CRP 0.56 (H) 03/14/2025       RADIOLOGY / PROCEDURES:     Assessment  / Plan     Seropositive rheumatoid arthritis  - +RF, + CCP, synovitis, ESR elevation   - methotrexate 15 mg weekly & folic acid 1 mg daily (2/2024)   - arava 40 mg weekly (1/2025)    Osteoarthritis- hands, shoulders, lumbar spine   - tylenol PRN    COPD  Respiratory crackles  - + COPD, HRCT w/ fibrosis/scarring, but no inflammation    Medication monitoring   - CBC, CMP, sed rate, CRP q 4 weeks x 1      No follow-ups on file.

## 2025-06-06 ENCOUNTER — HOSPITAL ENCOUNTER (OUTPATIENT)
Age: 85
Discharge: HOME | End: 2025-06-06
Payer: MEDICARE

## 2025-06-06 LAB
ALBUMIN SERPL BCG-MCNC: 3.9 G/DL (ref 3.4–4.9)
ALP SERPL-CCNC: 56 U/L (ref 40–129)
ALT SERPL W/O P-5'-P-CCNC: 31 U/L (ref 10–50)
ANION GAP SERPL CALC-SCNC: 13 MEQ/L (ref 8–16)
AST SERPL-CCNC: 32 U/L (ref 10–50)
BASOPHILS ABSOLUTE: 0.1 THOU/MM3 (ref 0–0.1)
BASOPHILS NFR BLD AUTO: 1.1 %
BILIRUB SERPL-MCNC: 0.5 MG/DL (ref 0.3–1.2)
BUN SERPL-MCNC: 16 MG/DL (ref 8–23)
CALCIUM SERPL-MCNC: 9.3 MG/DL (ref 8.8–10.2)
CHLORIDE SERPL-SCNC: 101 MEQ/L (ref 98–111)
CO2 SERPL-SCNC: 25 MEQ/L (ref 22–29)
CREAT SERPL-MCNC: 1.3 MG/DL (ref 0.7–1.2)
CRP SERPL-MCNC: < 0.3 MG/DL (ref 0–0.5)
DEPRECATED RDW RBC AUTO: 58.9 FL (ref 35–45)
EOSINOPHIL NFR BLD AUTO: 4.5 %
EOSINOPHILS ABSOLUTE: 0.2 THOU/MM3 (ref 0–0.4)
ERYTHROCYTE [DISTWIDTH] IN BLOOD BY AUTOMATED COUNT: 16 % (ref 11.5–14.5)
ERYTHROCYTE [SEDIMENTATION RATE] IN BLOOD BY WESTERGREN METHOD: 30 MM/HR (ref 0–10)
GFR SERPL CREATININE-BSD FRML MDRD: 53 ML/MIN/1.73M2
GLUCOSE SERPL-MCNC: 124 MG/DL (ref 74–109)
HCT VFR BLD AUTO: 40.3 % (ref 42–52)
HGB BLD-MCNC: 13.1 GM/DL (ref 14–18)
IMM GRANULOCYTES # BLD AUTO: 0.01 THOU/MM3 (ref 0–0.07)
IMM GRANULOCYTES NFR BLD AUTO: 0.2 %
LYMPHOCYTES ABSOLUTE: 1.2 THOU/MM3 (ref 1–4.8)
LYMPHOCYTES NFR BLD AUTO: 22.2 %
MCH RBC QN AUTO: 32.8 PG (ref 26–33)
MCHC RBC AUTO-ENTMCNC: 32.5 GM/DL (ref 32.2–35.5)
MCV RBC AUTO: 100.8 FL (ref 80–94)
MONOCYTES ABSOLUTE: 1.1 THOU/MM3 (ref 0.4–1.3)
MONOCYTES NFR BLD AUTO: 19.5 %
NEUTROPHILS ABSOLUTE: 2.9 THOU/MM3 (ref 1.8–7.7)
NEUTROPHILS NFR BLD AUTO: 52.5 %
NRBC BLD AUTO-RTO: 0 /100 WBC
PLATELET # BLD AUTO: 198 THOU/MM3 (ref 130–400)
PMV BLD AUTO: 10.3 FL (ref 9.4–12.4)
POTASSIUM SERPL-SCNC: 3.9 MEQ/L (ref 3.5–5.2)
PROT SERPL-MCNC: 7.1 G/DL (ref 6.4–8.3)
RBC # BLD AUTO: 4 MILL/MM3 (ref 4.7–6.1)
SODIUM SERPL-SCNC: 139 MEQ/L (ref 135–145)
WBC # BLD AUTO: 5.5 THOU/MM3 (ref 4.8–10.8)

## 2025-06-06 PROCEDURE — 80053 COMPREHEN METABOLIC PANEL: CPT

## 2025-06-06 PROCEDURE — 86140 C-REACTIVE PROTEIN: CPT

## 2025-06-06 PROCEDURE — 36415 COLL VENOUS BLD VENIPUNCTURE: CPT

## 2025-06-06 PROCEDURE — 85025 COMPLETE CBC W/AUTO DIFF WBC: CPT

## 2025-06-06 PROCEDURE — 85651 RBC SED RATE NONAUTOMATED: CPT

## 2025-06-08 ENCOUNTER — RESULTS FOLLOW-UP (OUTPATIENT)
Age: 85
End: 2025-06-08

## 2025-06-08 DIAGNOSIS — M05.9 SEROPOSITIVE RHEUMATOID ARTHRITIS (HCC): ICD-10-CM

## 2025-06-08 RX ORDER — LEFLUNOMIDE 20 MG/1
40 TABLET ORAL WEEKLY
Qty: 16 TABLET | Refills: 0 | Status: SHIPPED | OUTPATIENT
Start: 2025-06-08

## 2025-07-22 ENCOUNTER — HOSPITAL ENCOUNTER (OUTPATIENT)
Age: 85
Discharge: HOME | End: 2025-07-22
Payer: MEDICARE

## 2025-07-22 DIAGNOSIS — Z51.81 MEDICATION MONITORING ENCOUNTER: ICD-10-CM

## 2025-07-22 LAB
ALBUMIN SERPL BCG-MCNC: 3.7 G/DL (ref 3.4–4.9)
ALP SERPL-CCNC: 51 U/L (ref 40–129)
ALT SERPL W/O P-5'-P-CCNC: 23 U/L (ref 10–50)
ANION GAP SERPL CALC-SCNC: 10 MEQ/L (ref 8–16)
AST SERPL-CCNC: 21 U/L (ref 10–50)
BASOPHILS ABSOLUTE: 0.1 THOU/MM3 (ref 0–0.1)
BASOPHILS NFR BLD AUTO: 1.3 %
BILIRUB SERPL-MCNC: 0.3 MG/DL (ref 0.3–1.2)
BUN SERPL-MCNC: 13 MG/DL (ref 8–23)
CALCIUM SERPL-MCNC: 9 MG/DL (ref 8.8–10.2)
CHLORIDE SERPL-SCNC: 98 MEQ/L (ref 98–111)
CO2 SERPL-SCNC: 26 MEQ/L (ref 22–29)
CREAT SERPL-MCNC: 1.2 MG/DL (ref 0.7–1.2)
CRP SERPL-MCNC: 0.48 MG/DL (ref 0–0.5)
DEPRECATED RDW RBC AUTO: 57.3 FL (ref 35–45)
EOSINOPHIL NFR BLD AUTO: 4.9 %
EOSINOPHILS ABSOLUTE: 0.3 THOU/MM3 (ref 0–0.4)
ERYTHROCYTE [DISTWIDTH] IN BLOOD BY AUTOMATED COUNT: 15.5 % (ref 11.5–14.5)
ERYTHROCYTE [SEDIMENTATION RATE] IN BLOOD BY WESTERGREN METHOD: 38 MM/HR (ref 0–10)
GFR SERPL CREATININE-BSD FRML MDRD: 59 ML/MIN/1.73M2
GLUCOSE SERPL-MCNC: 114 MG/DL (ref 74–109)
HCT VFR BLD AUTO: 36.8 % (ref 42–52)
HGB BLD-MCNC: 11.8 GM/DL (ref 14–18)
IMM GRANULOCYTES # BLD AUTO: 0.01 THOU/MM3 (ref 0–0.07)
IMM GRANULOCYTES NFR BLD AUTO: 0.2 %
LYMPHOCYTES ABSOLUTE: 1.4 THOU/MM3 (ref 1–4.8)
LYMPHOCYTES NFR BLD AUTO: 24.8 %
MCH RBC QN AUTO: 32.6 PG (ref 26–33)
MCHC RBC AUTO-ENTMCNC: 32.1 GM/DL (ref 32.2–35.5)
MCV RBC AUTO: 101.7 FL (ref 80–94)
MONOCYTES ABSOLUTE: 0.9 THOU/MM3 (ref 0.4–1.3)
MONOCYTES NFR BLD AUTO: 16.9 %
NEUTROPHILS ABSOLUTE: 2.9 THOU/MM3 (ref 1.8–7.7)
NEUTROPHILS NFR BLD AUTO: 51.9 %
NRBC BLD AUTO-RTO: 0 /100 WBC
PLATELET # BLD AUTO: 221 THOU/MM3 (ref 130–400)
PMV BLD AUTO: 10.3 FL (ref 9.4–12.4)
POTASSIUM SERPL-SCNC: 3.7 MEQ/L (ref 3.5–5.2)
PROT SERPL-MCNC: 6.7 G/DL (ref 6.4–8.3)
RBC # BLD AUTO: 3.62 MILL/MM3 (ref 4.7–6.1)
SODIUM SERPL-SCNC: 134 MEQ/L (ref 135–145)
WBC # BLD AUTO: 5.6 THOU/MM3 (ref 4.8–10.8)

## 2025-07-22 PROCEDURE — 86140 C-REACTIVE PROTEIN: CPT

## 2025-07-22 PROCEDURE — 85025 COMPLETE CBC W/AUTO DIFF WBC: CPT

## 2025-07-22 PROCEDURE — 36415 COLL VENOUS BLD VENIPUNCTURE: CPT

## 2025-07-22 PROCEDURE — 85651 RBC SED RATE NONAUTOMATED: CPT

## 2025-07-22 PROCEDURE — 80053 COMPREHEN METABOLIC PANEL: CPT

## 2025-07-23 DIAGNOSIS — M05.9 SEROPOSITIVE RHEUMATOID ARTHRITIS (HCC): ICD-10-CM

## 2025-07-23 RX ORDER — LEFLUNOMIDE 20 MG/1
40 TABLET ORAL WEEKLY
Qty: 16 TABLET | Refills: 0 | Status: SHIPPED | OUTPATIENT
Start: 2025-07-23

## 2025-07-28 ENCOUNTER — HOSPITAL ENCOUNTER (OUTPATIENT)
Dept: GENERAL RADIOLOGY | Age: 85
Discharge: HOME | End: 2025-07-28
Payer: MEDICARE

## 2025-07-28 ENCOUNTER — HOSPITAL ENCOUNTER (OUTPATIENT)
Age: 85
Discharge: HOME | End: 2025-07-28
Payer: MEDICARE

## 2025-07-28 ENCOUNTER — OFFICE VISIT (OUTPATIENT)
Age: 85
End: 2025-07-28
Payer: MEDICARE

## 2025-07-28 VITALS
HEART RATE: 78 BPM | SYSTOLIC BLOOD PRESSURE: 132 MMHG | BODY MASS INDEX: 33.5 KG/M2 | WEIGHT: 234 LBS | DIASTOLIC BLOOD PRESSURE: 72 MMHG | HEIGHT: 70 IN | OXYGEN SATURATION: 94 %

## 2025-07-28 DIAGNOSIS — T80.89XS: ICD-10-CM

## 2025-07-28 DIAGNOSIS — G89.29 CHRONIC MIDLINE LOW BACK PAIN WITHOUT SCIATICA: ICD-10-CM

## 2025-07-28 DIAGNOSIS — R09.89 RESPIRATORY CRACKLES: ICD-10-CM

## 2025-07-28 DIAGNOSIS — M79.659: ICD-10-CM

## 2025-07-28 DIAGNOSIS — M47.816 OSTEOARTHRITIS OF LUMBAR SPINE, UNSPECIFIED SPINAL OSTEOARTHRITIS COMPLICATION STATUS: ICD-10-CM

## 2025-07-28 DIAGNOSIS — Z51.81 MEDICATION MONITORING ENCOUNTER: ICD-10-CM

## 2025-07-28 DIAGNOSIS — M54.50 CHRONIC MIDLINE LOW BACK PAIN WITHOUT SCIATICA: ICD-10-CM

## 2025-07-28 DIAGNOSIS — M19.012 OSTEOARTHRITIS OF BOTH SHOULDERS, UNSPECIFIED OSTEOARTHRITIS TYPE: ICD-10-CM

## 2025-07-28 DIAGNOSIS — M05.9 SEROPOSITIVE RHEUMATOID ARTHRITIS (HCC): Primary | ICD-10-CM

## 2025-07-28 DIAGNOSIS — M19.011 OSTEOARTHRITIS OF BOTH SHOULDERS, UNSPECIFIED OSTEOARTHRITIS TYPE: ICD-10-CM

## 2025-07-28 PROCEDURE — G8427 DOCREV CUR MEDS BY ELIG CLIN: HCPCS | Performed by: INTERNAL MEDICINE

## 2025-07-28 PROCEDURE — 1126F AMNT PAIN NOTED NONE PRSNT: CPT | Performed by: INTERNAL MEDICINE

## 2025-07-28 PROCEDURE — 1036F TOBACCO NON-USER: CPT | Performed by: INTERNAL MEDICINE

## 2025-07-28 PROCEDURE — 99214 OFFICE O/P EST MOD 30 MIN: CPT | Performed by: INTERNAL MEDICINE

## 2025-07-28 PROCEDURE — G2211 COMPLEX E/M VISIT ADD ON: HCPCS | Performed by: INTERNAL MEDICINE

## 2025-07-28 PROCEDURE — 1159F MED LIST DOCD IN RCRD: CPT | Performed by: INTERNAL MEDICINE

## 2025-07-28 PROCEDURE — 72100 X-RAY EXAM L-S SPINE 2/3 VWS: CPT

## 2025-07-28 PROCEDURE — 73521 X-RAY EXAM HIPS BI 2 VIEWS: CPT

## 2025-07-28 PROCEDURE — 1123F ACP DISCUSS/DSCN MKR DOCD: CPT | Performed by: INTERNAL MEDICINE

## 2025-07-28 PROCEDURE — G8417 CALC BMI ABV UP PARAM F/U: HCPCS | Performed by: INTERNAL MEDICINE

## 2025-07-28 PROCEDURE — 99213 OFFICE O/P EST LOW 20 MIN: CPT | Performed by: INTERNAL MEDICINE

## 2025-07-28 PROCEDURE — 1160F RVW MEDS BY RX/DR IN RCRD: CPT | Performed by: INTERNAL MEDICINE

## 2025-07-28 RX ORDER — PREDNISONE 10 MG/1
TABLET ORAL
Qty: 15 TABLET | Refills: 0 | Status: SHIPPED | OUTPATIENT
Start: 2025-07-28 | End: 2025-08-07

## 2025-07-28 ASSESSMENT — ENCOUNTER SYMPTOMS
DIARRHEA: 0
COUGH: 0
EYE PAIN: 0
TROUBLE SWALLOWING: 0
EYE ITCHING: 0
BACK PAIN: 0
CONSTIPATION: 0
ABDOMINAL PAIN: 0
NAUSEA: 0
SHORTNESS OF BREATH: 1

## 2025-07-28 NOTE — PROGRESS NOTES
[facility] RHEUMATOLOGY FOLLOW UP NOTE     Date Of Service: 7/28/2025  Provider: KASSY LANGFORD DO   PCP: Fredis Wise, APRN - CNP   Name: Rory Grullon   MRN: [id number]    Subjective   CHIEF COMPLAINT:    Chief Complaint   Patient presents with    Follow-up     4 month follow up RA       Rory Grullon  is a(n)85 y.o. male  here for the f/u evaluation of rheumatoid arthritis , osteoarthritis      Interval hx:     Pain in the thighs bilateral worsened with walking and standing, improved with sitting.  No reported radiculopathy   - in wheel chair with prolonged walking such as grocery and home - typically ambulating with walker at home  - leg strengthening with physical therapy twice weekly for the past 3 weeks - mild improvement of strength per wife.       - Rheumatoid Arthritis no flares since the last evaluation reported.  Patient and wife have no reported complaints.  Denies morning stiffness.  No complaint of joint pains      Intermittent shortness of breath improved with COPD inhalers    REVIEW OF SYSTEMS: (ROS)    Review of Systems   Constitutional:  Negative for fatigue, fever and unexpected weight change.   HENT:  Negative for congestion and trouble swallowing.    Eyes:  Negative for pain and itching.   Respiratory:  Positive for shortness of breath. Negative for cough.    Cardiovascular:  Negative for chest pain and leg swelling.   Gastrointestinal:  Negative for abdominal pain, constipation, diarrhea and nausea.   Endocrine: Negative for cold intolerance and heat intolerance.   Genitourinary:  Negative for difficulty urinating, frequency and urgency.   Musculoskeletal:  Negative for arthralgias, back pain and joint swelling.   Skin:  Negative for rash.   Neurological:  Negative for dizziness, weakness, numbness and headaches.   Psychiatric/Behavioral:  The patient is not nervous/anxious.        Past Medical History:  has a past medical history of Atrial flutter (HCC), Cancer of appendix

## 2025-08-15 RX ORDER — METHOTREXATE 2.5 MG/1
15 TABLET ORAL WEEKLY
Qty: 24 TABLET | Refills: 2 | Status: SHIPPED | OUTPATIENT
Start: 2025-08-15 | End: 2025-11-13

## 2025-09-03 DIAGNOSIS — Z51.81 MEDICATION MONITORING ENCOUNTER: Primary | ICD-10-CM

## 2025-09-04 ENCOUNTER — HOSPITAL ENCOUNTER (OUTPATIENT)
Dept: GENERAL RADIOLOGY | Age: 85
Discharge: HOME | End: 2025-09-04
Payer: MEDICARE

## 2025-09-04 DIAGNOSIS — M05.9 SEROPOSITIVE RHEUMATOID ARTHRITIS (HCC): ICD-10-CM

## 2025-09-04 DIAGNOSIS — Z51.81 MEDICATION MONITORING ENCOUNTER: ICD-10-CM

## 2025-09-04 LAB
ALBUMIN SERPL BCG-MCNC: 3.7 G/DL (ref 3.4–4.9)
ALP SERPL-CCNC: 58 U/L (ref 40–129)
ALT SERPL W/O P-5'-P-CCNC: 24 U/L (ref 10–50)
ANION GAP SERPL CALC-SCNC: 13 MEQ/L (ref 8–16)
AST SERPL-CCNC: 23 U/L (ref 10–50)
BASOPHILS ABSOLUTE: 0.1 THOU/MM3 (ref 0–0.1)
BASOPHILS NFR BLD AUTO: 1.3 %
BILIRUB SERPL-MCNC: 0.5 MG/DL (ref 0.3–1.2)
BUN SERPL-MCNC: 20 MG/DL (ref 8–23)
CALCIUM SERPL-MCNC: 9.4 MG/DL (ref 8.5–10.5)
CHLORIDE SERPL-SCNC: 96 MEQ/L (ref 98–111)
CO2 SERPL-SCNC: 24 MEQ/L (ref 22–29)
CREAT SERPL-MCNC: 1.3 MG/DL (ref 0.7–1.2)
CRP SERPL-MCNC: 0.33 MG/DL (ref 0–0.5)
DEPRECATED RDW RBC AUTO: 56.1 FL (ref 35–45)
EOSINOPHIL NFR BLD AUTO: 7.2 %
EOSINOPHILS ABSOLUTE: 0.4 THOU/MM3 (ref 0–0.4)
ERYTHROCYTE [DISTWIDTH] IN BLOOD BY AUTOMATED COUNT: 15.5 % (ref 11.5–14.5)
ERYTHROCYTE [SEDIMENTATION RATE] IN BLOOD BY WESTERGREN METHOD: 53 MM/HR (ref 0–10)
GFR SERPL CREATININE-BSD FRML MDRD: 53 ML/MIN/1.73M2
GLUCOSE SERPL-MCNC: 143 MG/DL (ref 74–109)
HCT VFR BLD AUTO: 37.3 % (ref 42–52)
HGB BLD-MCNC: 12.2 GM/DL (ref 14–18)
IMM GRANULOCYTES # BLD AUTO: 0.02 THOU/MM3 (ref 0–0.07)
IMM GRANULOCYTES NFR BLD AUTO: 0.4 %
LYMPHOCYTES ABSOLUTE: 0.9 THOU/MM3 (ref 1–4.8)
LYMPHOCYTES NFR BLD AUTO: 16.4 %
MCH RBC QN AUTO: 32.9 PG (ref 26–33)
MCHC RBC AUTO-ENTMCNC: 32.7 GM/DL (ref 32.2–35.5)
MCV RBC AUTO: 100.5 FL (ref 80–94)
MONOCYTES ABSOLUTE: 1.1 THOU/MM3 (ref 0.4–1.3)
MONOCYTES NFR BLD AUTO: 20 %
NEUTROPHILS ABSOLUTE: 2.9 THOU/MM3 (ref 1.8–7.7)
NEUTROPHILS NFR BLD AUTO: 54.7 %
NRBC BLD AUTO-RTO: 0 /100 WBC
PLATELET # BLD AUTO: 206 THOU/MM3 (ref 130–400)
PMV BLD AUTO: 10.5 FL (ref 9.4–12.4)
POTASSIUM SERPL-SCNC: 3.4 MEQ/L (ref 3.5–5.2)
PROT SERPL-MCNC: 7 G/DL (ref 6.4–8.3)
RBC # BLD AUTO: 3.71 MILL/MM3 (ref 4.7–6.1)
SODIUM SERPL-SCNC: 133 MEQ/L (ref 135–145)
WBC # BLD AUTO: 5.3 THOU/MM3 (ref 4.8–10.8)

## 2025-09-04 PROCEDURE — 86140 C-REACTIVE PROTEIN: CPT

## 2025-09-04 PROCEDURE — 85651 RBC SED RATE NONAUTOMATED: CPT

## 2025-09-04 PROCEDURE — 80053 COMPREHEN METABOLIC PANEL: CPT

## 2025-09-04 PROCEDURE — 85025 COMPLETE CBC W/AUTO DIFF WBC: CPT

## 2025-09-04 PROCEDURE — 36415 COLL VENOUS BLD VENIPUNCTURE: CPT

## 2025-09-04 RX ORDER — LEFLUNOMIDE 20 MG/1
40 TABLET ORAL WEEKLY
Qty: 16 TABLET | Refills: 0 | Status: SHIPPED | OUTPATIENT
Start: 2025-09-04

## (undated) DEVICE — SUTURE VCRL SZ 4-0 L27IN ABSRB UD L19MM FS-2 3/8 CIR REV J422H

## (undated) DEVICE — [HIGH FLOW INSUFFLATOR,  DO NOT USE IF PACKAGE IS DAMAGED,  KEEP DRY,  KEEP AWAY FROM SUNLIGHT,  PROTECT FROM HEAT AND RADIOACTIVE SOURCES.]: Brand: PNEUMOSURE

## (undated) DEVICE — TISSUE RETRIEVAL SYSTEM: Brand: INZII RETRIEVAL SYSTEM

## (undated) DEVICE — YANKAUER,BULB TIP,W/O VENT,RIGID,STERILE: Brand: MEDLINE

## (undated) DEVICE — SOLUTION ANTIFOG VIS SYS CLEARIFY LAPSCP

## (undated) DEVICE — WOUND RETRACTOR AND PROTECTOR: Brand: ALEXIS O WOUND PROTECTOR-RETRACTOR

## (undated) DEVICE — INSUFFLATION NEEDLE TO ESTABLISH PNEUMOPERITONEUM.: Brand: INSUFFLATION NEEDLE

## (undated) DEVICE — BLADE CLIPPER GEN PURP NS

## (undated) DEVICE — COAGULATOR ELECSURG BLADE 10 FTX1 IN PTFE STRL ULTRACLEAN

## (undated) DEVICE — GLOVE SURG SZ 65 THK91MIL LTX FREE SYN POLYISOPRENE

## (undated) DEVICE — COVER ARMBRD W13XL28.5IN IMPERV BLU FOR OP RM

## (undated) DEVICE — SOLUTION IV 1000ML LAC RINGERS PH 6.5 INJ USP VIAFLX PLAS

## (undated) DEVICE — GARMENT,MEDLINE,DVT,INT,CALF,MED, GEN2: Brand: MEDLINE

## (undated) DEVICE — APPLIER CLP M L L11.4IN DIA10MM ENDOSCP ROT MULT FOR LIG

## (undated) DEVICE — GOWN,SIRUS,NON REINFRCD,LARGE,SET IN SL: Brand: MEDLINE

## (undated) DEVICE — FIBER LASER HOLM DISP SU200RT] LEONI FIBER OPTICS INC]

## (undated) DEVICE — UNIVERSAL MONOPOLAR LAPAROSCOPIC CABLE 10FT, 4MM PIN CONNECTOR: Brand: CONMED

## (undated) DEVICE — GAUZE,SPONGE,2"X2",8PLY,STERILE,LF,2'S: Brand: MEDLINE

## (undated) DEVICE — CATHETER,FOLEY,SILI-ELAST,LTX,20FR,10ML: Brand: MEDLINE

## (undated) DEVICE — CANISTER, RIGID, 2000CC: Brand: MEDLINE INDUSTRIES, INC.

## (undated) DEVICE — TROCAR ENDOSCP BLDELSS 12X100 MM W/ HNDL STBL SL OPT TIP

## (undated) DEVICE — TUBING, SUCTION, 1/4" X 20', STRAIGHT: Brand: MEDLINE INDUSTRIES, INC.

## (undated) DEVICE — 35 ML SYRINGE LUER-LOCK TIP: Brand: MONOJECT

## (undated) DEVICE — SOLUTION IV IRRIG WATER 1000ML POUR BRL 2F7114

## (undated) DEVICE — GLOVE ORANGE PI 7   MSG9070

## (undated) DEVICE — GLOVE ORANGE PI 8   MSG9080

## (undated) DEVICE — STAPLER ENDOSCP 45MM L34CM STD NAT ARTC LIN CUT ECHELON FLX

## (undated) DEVICE — Z DUP USE 2641840 CLIP INT L POLYMER LOK LIG HEM O LOK

## (undated) DEVICE — SOLUTION IV 1000ML 0.9% SOD CHL PH 5 INJ USP VIAFLX PLAS

## (undated) DEVICE — CANNULA SEAL

## (undated) DEVICE — POSITIONER HD W8XH4XL8.5IN RASPBERRY FOAM SLT

## (undated) DEVICE — TUBING FLTR PLUME AWAY EVAC W/ SUCT DEV DISP PUREVIEW

## (undated) DEVICE — ELECTRO LUBE IS A SINGLE PATIENT USE DEVICE THAT IS INTENDED TO BE USED ON ELECTROSURGICAL ELECTRODES TO REDUCE STICKING.: Brand: KEY SURGICAL ELECTRO LUBE

## (undated) DEVICE — TROCAR: Brand: KII SHIELDED BLADED ACCESS SYSTEM

## (undated) DEVICE — GAUZE,SPONGE,8"X4",12PLY,XRAY,STRL,LF: Brand: MEDLINE

## (undated) DEVICE — INTENDED FOR TISSUE SEPARATION, AND OTHER PROCEDURES THAT REQUIRE A SHARP SURGICAL BLADE TO PUNCTURE OR CUT.: Brand: BARD-PARKER ® CARBON RIB-BACK BLADES

## (undated) DEVICE — COLUMN DRAPE

## (undated) DEVICE — Z DISCONTINUED BY MEDLINE USE 2711682 TRAY SKIN PREP DRY W/ PREM GLV

## (undated) DEVICE — URETERAL ACCESS SHEATH SET: Brand: NAVIGATOR HD

## (undated) DEVICE — BLADELESS OBTURATOR: Brand: WECK VISTA

## (undated) DEVICE — STAPLER INT L60MM DIA12MM STD TISS TI LNAR CUT LN 6 ROW

## (undated) DEVICE — SYRINGE MED 10ML LUERLOCK TIP W/O SFTY DISP

## (undated) DEVICE — VESSEL SEALER: Brand: ENDOWRIST

## (undated) DEVICE — CORE TRUMPET FOR SINGLE SOLUTION BAG: Brand: CORE DYNAMICS

## (undated) DEVICE — BLADE LARYNSCP SZ 3 ENH DIR INTUB GLIDESCOPE MCGRATH MAC

## (undated) DEVICE — TROCAR ENDOSCP SHFT L100MM DIA5MM DIL TIP ENDOPATH XCEL

## (undated) DEVICE — ARM DRAPE

## (undated) DEVICE — DRESSING TRNSPAR W2XL2.75IN FLM SHT SEMIPERMEABLE WIND

## (undated) DEVICE — SPONGE LAP W18XL18IN WHT COT 4 PLY FLD STRUNG RADPQ DISP ST

## (undated) DEVICE — TRI-LUMEN FILTERED TUBE SET WITH ACTIVATED CHARCOAL FILTER: Brand: AIRSEAL

## (undated) DEVICE — PACK PROCEDURE SURG SET UP SRMC

## (undated) DEVICE — GUIDEWIRE ENDOSCP L150CM DIA0.035IN TIP 3CM PTFE NIT

## (undated) DEVICE — APPLICATOR ENDOSCP L34CM W/ S STL CANN PLAS OBT STYL FOR

## (undated) DEVICE — 3M™ WARMING BLANKET, UPPER BODY, 10 PER CASE, 42268: Brand: BAIR HUGGER™

## (undated) DEVICE — EXCEL 10FT (3.05 M) INSUFFLATION TUBING SET WITH 0.1 MICRON FILTER: Brand: EXCEL

## (undated) DEVICE — SYRINGE CATH TIP 50ML

## (undated) DEVICE — 4-PORT MANIFOLD: Brand: NEPTUNE 2

## (undated) DEVICE — PUMP SUC IRR TBNG L10FT W/ HNDPC ASSEMB STRYKEFLOW 2

## (undated) DEVICE — CONTAINER,SPECIMEN,PNEU TUBE,4OZ,OR STRL: Brand: MEDLINE

## (undated) DEVICE — TIP APPL L35CM RIG FOR SEAL EVICEL

## (undated) DEVICE — GOWN,SIRUS,NONRNF,SETINSLV,XL,20/CS: Brand: MEDLINE

## (undated) DEVICE — SUTURE V-LOC 180 SZ 3-0 L9IN ABSRB GRN L26MM V-20 1/2 CIR VLOCL0644

## (undated) DEVICE — CHLORAPREP 26ML ORANGE

## (undated) DEVICE — PACK PROC LAP II AURORA

## (undated) DEVICE — GLOVE SURG SZ 6 THK91MIL LTX FREE SYN POLYISOPRENE ANTI

## (undated) DEVICE — TIP COVER ACCESSORY

## (undated) DEVICE — GENERAL LAPAROSCOPY PACK-LF: Brand: MEDLINE INDUSTRIES, INC.

## (undated) DEVICE — COVER EQUIP STEEP TREND EZ CLN UP WTRPRF DISP FOR STD SZ

## (undated) DEVICE — AIRSEAL 12 MM ACCESS PORT AND PALM GRIP OBTURATOR WITH BLADELESS OPTICAL TIP, 120 MM LENGTH: Brand: AIRSEAL

## (undated) DEVICE — NITINOL STONE RETRIEVAL BASKET: Brand: ZERO TIP

## (undated) DEVICE — AGENT HEMSTAT W4XL8IN OXIDIZED REGENERATED CELOS ABSRB

## (undated) DEVICE — BAG 3X6 DETACH NYL SPEC

## (undated) DEVICE — CATHETER,FOLEY,SILI-ELAST,LTX,18FR,10ML: Brand: MEDLINE

## (undated) DEVICE — WIPE SANI AF3 GERM DISP 6X6.75

## (undated) DEVICE — DRAPE,ROBOTICS,STERILE: Brand: MEDLINE

## (undated) DEVICE — JELLY,LUBE,STERILE,FLIP TOP,TUBE,2-OZ: Brand: MEDLINE

## (undated) DEVICE — SUTURE MCRYL SZ 3-0 L27IN ABSRB VLT L17MM RB-1 1/2 CIR Y305H

## (undated) DEVICE — TRAY CATH 16FR F INCLUDE LUB DRNGE BG STATLOK STBL DEV

## (undated) DEVICE — Device

## (undated) DEVICE — GLOVE ORANGE PI 7 1/2   MSG9075

## (undated) DEVICE — BASIC SINGLE BASIN BTC-LF: Brand: MEDLINE INDUSTRIES, INC.

## (undated) DEVICE — DRAINBAG,ANTI-REFLUX TOWER,L/F,2000ML,LL: Brand: MEDLINE

## (undated) DEVICE — STRIP,CLOSURE,WOUND,MEDI-STRIP,1/2X4: Brand: MEDLINE

## (undated) DEVICE — DRESSING TRNSPAR W5XL4.5IN FLM SHT SEMIPERMEABLE WIND

## (undated) DEVICE — SOLUTION IV IRRIG POUR BRL 0.9% SODIUM CHL 2F7124

## (undated) DEVICE — BANDAGE ADH W1XL3IN NAT FAB WVN FLX DURABLE N ADH PD SEAL

## (undated) DEVICE — GAUZE,SPONGE,4"X4",12PLY,STERILE,LF,2'S: Brand: MEDLINE